# Patient Record
Sex: FEMALE | Race: WHITE | HISPANIC OR LATINO | Employment: OTHER | URBAN - METROPOLITAN AREA
[De-identification: names, ages, dates, MRNs, and addresses within clinical notes are randomized per-mention and may not be internally consistent; named-entity substitution may affect disease eponyms.]

---

## 2021-01-01 ENCOUNTER — APPOINTMENT (OUTPATIENT)
Dept: CT IMAGING | Facility: HOSPITAL | Age: 86
DRG: 291 | End: 2021-01-01
Payer: MEDICARE

## 2021-01-01 ENCOUNTER — HOSPITAL ENCOUNTER (INPATIENT)
Facility: HOSPITAL | Age: 86
LOS: 2 days | Discharge: HOME HEALTH | DRG: 291 | End: 2021-12-21
Attending: EMERGENCY MEDICINE | Admitting: INTERNAL MEDICINE
Payer: MEDICARE

## 2021-01-01 ENCOUNTER — OFFICE VISIT (OUTPATIENT)
Dept: INTERNAL MEDICINE CLINIC | Facility: CLINIC | Age: 86
End: 2021-01-01
Payer: MEDICARE

## 2021-01-01 ENCOUNTER — APPOINTMENT (INPATIENT)
Dept: NON INVASIVE DIAGNOSTICS | Facility: HOSPITAL | Age: 86
DRG: 291 | End: 2021-01-01
Payer: MEDICARE

## 2021-01-01 ENCOUNTER — TRANSITIONAL CARE MANAGEMENT (OUTPATIENT)
Dept: INTERNAL MEDICINE CLINIC | Facility: CLINIC | Age: 86
End: 2021-01-01

## 2021-01-01 ENCOUNTER — APPOINTMENT (EMERGENCY)
Dept: RADIOLOGY | Facility: HOSPITAL | Age: 86
DRG: 291 | End: 2021-01-01
Payer: MEDICARE

## 2021-01-01 VITALS
WEIGHT: 129 LBS | SYSTOLIC BLOOD PRESSURE: 130 MMHG | DIASTOLIC BLOOD PRESSURE: 78 MMHG | TEMPERATURE: 98.8 F | HEART RATE: 120 BPM | BODY MASS INDEX: 27.83 KG/M2 | HEIGHT: 57 IN | OXYGEN SATURATION: 98 %

## 2021-01-01 VITALS
RESPIRATION RATE: 18 BRPM | WEIGHT: 126.98 LBS | HEIGHT: 57 IN | DIASTOLIC BLOOD PRESSURE: 57 MMHG | HEART RATE: 111 BPM | TEMPERATURE: 98.3 F | BODY MASS INDEX: 27.4 KG/M2 | SYSTOLIC BLOOD PRESSURE: 110 MMHG | OXYGEN SATURATION: 97 %

## 2021-01-01 DIAGNOSIS — I50.9 ACUTE CONGESTIVE HEART FAILURE, UNSPECIFIED HEART FAILURE TYPE (HCC): ICD-10-CM

## 2021-01-01 DIAGNOSIS — E55.9 VITAMIN D DEFICIENCY: ICD-10-CM

## 2021-01-01 DIAGNOSIS — Z95.810 STATUS POST IMPLANTATION OF AUTOMATIC CARDIOVERTER/DEFIBRILLATOR (AICD): ICD-10-CM

## 2021-01-01 DIAGNOSIS — R06.02 SHORTNESS OF BREATH: Primary | ICD-10-CM

## 2021-01-01 DIAGNOSIS — E53.8 VITAMIN B12 DEFICIENCY: ICD-10-CM

## 2021-01-01 DIAGNOSIS — I11.0 HYPERTENSIVE HEART DISEASE WITH CONGESTIVE HEART FAILURE, UNSPECIFIED HEART FAILURE TYPE (HCC): ICD-10-CM

## 2021-01-01 DIAGNOSIS — R79.89 ELEVATED BRAIN NATRIURETIC PEPTIDE (BNP) LEVEL: ICD-10-CM

## 2021-01-01 DIAGNOSIS — F41.9 ANXIETY: ICD-10-CM

## 2021-01-01 DIAGNOSIS — J44.9 CHRONIC OBSTRUCTIVE PULMONARY DISEASE, UNSPECIFIED COPD TYPE (HCC): ICD-10-CM

## 2021-01-01 DIAGNOSIS — G47.33 OBSTRUCTIVE SLEEP APNEA SYNDROME: ICD-10-CM

## 2021-01-01 DIAGNOSIS — K21.9 GASTROESOPHAGEAL REFLUX DISEASE WITHOUT ESOPHAGITIS: ICD-10-CM

## 2021-01-01 DIAGNOSIS — I50.9 CHRONIC CONGESTIVE HEART FAILURE, UNSPECIFIED HEART FAILURE TYPE (HCC): ICD-10-CM

## 2021-01-01 DIAGNOSIS — E78.2 MIXED HYPERLIPIDEMIA: ICD-10-CM

## 2021-01-01 DIAGNOSIS — E11.9 NON-INSULIN DEPENDENT TYPE 2 DIABETES MELLITUS (HCC): ICD-10-CM

## 2021-01-01 DIAGNOSIS — I10 HYPERTENSION, UNSPECIFIED TYPE: Primary | ICD-10-CM

## 2021-01-01 LAB
2HR DELTA HS TROPONIN: 2 NG/L
4HR DELTA HS TROPONIN: 38 NG/L
ALBUMIN SERPL BCP-MCNC: 3.7 G/DL (ref 3.4–4.8)
ALP SERPL-CCNC: 59 U/L (ref 35–140)
ALT SERPL W P-5'-P-CCNC: 12 U/L (ref 5–54)
ANION GAP SERPL CALCULATED.3IONS-SCNC: 5 MMOL/L (ref 4–13)
ANION GAP SERPL CALCULATED.3IONS-SCNC: 5 MMOL/L (ref 4–13)
ANION GAP SERPL CALCULATED.3IONS-SCNC: 7 MMOL/L (ref 4–13)
ANION GAP SERPL CALCULATED.3IONS-SCNC: 8 MMOL/L (ref 4–13)
AORTIC ROOT: 2.2 CM
APICAL FOUR CHAMBER EJECTION FRACTION: 23 %
APTT PPP: 23 SECONDS (ref 23–37)
AST SERPL W P-5'-P-CCNC: 16 U/L (ref 15–41)
ATRIAL RATE: 120 BPM
BASOPHILS # BLD AUTO: 0.01 THOUSANDS/ΜL (ref 0–0.1)
BASOPHILS # BLD AUTO: 0.01 THOUSANDS/ΜL (ref 0–0.1)
BASOPHILS # BLD AUTO: 0.04 THOUSANDS/ΜL (ref 0–0.1)
BASOPHILS NFR BLD AUTO: 0 % (ref 0–1)
BILIRUB SERPL-MCNC: 0.66 MG/DL (ref 0.3–1.2)
BILIRUB UR QL STRIP: NEGATIVE
BNP SERPL-MCNC: 594.1 PG/ML (ref 1–100)
BUN SERPL-MCNC: 19 MG/DL (ref 6–20)
BUN SERPL-MCNC: 20 MG/DL (ref 6–20)
BUN SERPL-MCNC: 22 MG/DL (ref 6–20)
BUN SERPL-MCNC: 25 MG/DL (ref 6–20)
CALCIUM SERPL-MCNC: 8.5 MG/DL (ref 8.4–10.2)
CALCIUM SERPL-MCNC: 8.6 MG/DL (ref 8.4–10.2)
CALCIUM SERPL-MCNC: 8.7 MG/DL (ref 8.4–10.2)
CALCIUM SERPL-MCNC: 9 MG/DL (ref 8.4–10.2)
CARDIAC TROPONIN I PNL SERPL HS: 23 NG/L
CARDIAC TROPONIN I PNL SERPL HS: 25 NG/L
CARDIAC TROPONIN I PNL SERPL HS: 61 NG/L
CHLORIDE SERPL-SCNC: 100 MMOL/L (ref 96–108)
CHLORIDE SERPL-SCNC: 101 MMOL/L (ref 96–108)
CHLORIDE SERPL-SCNC: 102 MMOL/L (ref 96–108)
CHLORIDE SERPL-SCNC: 104 MMOL/L (ref 96–108)
CLARITY UR: CLEAR
CO2 SERPL-SCNC: 29 MMOL/L (ref 22–33)
CO2 SERPL-SCNC: 29 MMOL/L (ref 22–33)
CO2 SERPL-SCNC: 32 MMOL/L (ref 22–33)
CO2 SERPL-SCNC: 33 MMOL/L (ref 22–33)
COLOR UR: YELLOW
CREAT SERPL-MCNC: 0.94 MG/DL (ref 0.4–1.1)
CREAT SERPL-MCNC: 0.94 MG/DL (ref 0.4–1.1)
CREAT SERPL-MCNC: 1.12 MG/DL (ref 0.4–1.1)
CREAT SERPL-MCNC: 1.14 MG/DL (ref 0.4–1.1)
D DIMER PPP FEU-MCNC: 9.65 UG/ML FEU
EOSINOPHIL # BLD AUTO: 0 THOUSAND/ΜL (ref 0–0.61)
EOSINOPHIL NFR BLD AUTO: 0 % (ref 0–6)
ERYTHROCYTE [DISTWIDTH] IN BLOOD BY AUTOMATED COUNT: 13.4 % (ref 11.6–15.1)
ERYTHROCYTE [DISTWIDTH] IN BLOOD BY AUTOMATED COUNT: 13.6 % (ref 11.6–15.1)
ERYTHROCYTE [DISTWIDTH] IN BLOOD BY AUTOMATED COUNT: 13.8 % (ref 11.6–15.1)
ERYTHROCYTE [DISTWIDTH] IN BLOOD BY AUTOMATED COUNT: 14 % (ref 11.6–15.1)
ERYTHROCYTE [DISTWIDTH] IN BLOOD BY AUTOMATED COUNT: 14.3 % (ref 11.6–15.1)
EST. AVERAGE GLUCOSE BLD GHB EST-MCNC: 123 MG/DL
FLUAV RNA RESP QL NAA+PROBE: NEGATIVE
FLUBV RNA RESP QL NAA+PROBE: NEGATIVE
FRACTIONAL SHORTENING: 8 % (ref 28–44)
GFR SERPL CREATININE-BSD FRML MDRD: 43 ML/MIN/1.73SQ M
GFR SERPL CREATININE-BSD FRML MDRD: 44 ML/MIN/1.73SQ M
GFR SERPL CREATININE-BSD FRML MDRD: 55 ML/MIN/1.73SQ M
GFR SERPL CREATININE-BSD FRML MDRD: 55 ML/MIN/1.73SQ M
GLUCOSE P FAST SERPL-MCNC: 93 MG/DL (ref 70–105)
GLUCOSE SERPL-MCNC: 101 MG/DL (ref 65–140)
GLUCOSE SERPL-MCNC: 103 MG/DL (ref 65–140)
GLUCOSE SERPL-MCNC: 105 MG/DL (ref 65–140)
GLUCOSE SERPL-MCNC: 108 MG/DL (ref 65–140)
GLUCOSE SERPL-MCNC: 113 MG/DL (ref 65–140)
GLUCOSE SERPL-MCNC: 114 MG/DL (ref 65–140)
GLUCOSE SERPL-MCNC: 117 MG/DL (ref 65–140)
GLUCOSE SERPL-MCNC: 122 MG/DL (ref 65–140)
GLUCOSE SERPL-MCNC: 124 MG/DL (ref 65–140)
GLUCOSE SERPL-MCNC: 126 MG/DL (ref 65–140)
GLUCOSE SERPL-MCNC: 152 MG/DL (ref 65–140)
GLUCOSE SERPL-MCNC: 157 MG/DL (ref 65–140)
GLUCOSE SERPL-MCNC: 76 MG/DL (ref 65–140)
GLUCOSE SERPL-MCNC: 86 MG/DL (ref 65–140)
GLUCOSE SERPL-MCNC: 92 MG/DL (ref 65–140)
GLUCOSE SERPL-MCNC: 93 MG/DL (ref 65–140)
GLUCOSE SERPL-MCNC: 94 MG/DL (ref 65–140)
GLUCOSE UR STRIP-MCNC: NEGATIVE MG/DL
HBA1C MFR BLD: 5.9 %
HCT VFR BLD AUTO: 32.2 % (ref 34.8–46.1)
HCT VFR BLD AUTO: 32.8 % (ref 34.8–46.1)
HCT VFR BLD AUTO: 32.9 % (ref 34.8–46.1)
HCT VFR BLD AUTO: 34.2 % (ref 34.8–46.1)
HCT VFR BLD AUTO: 37.5 % (ref 34.8–46.1)
HGB BLD-MCNC: 10 G/DL (ref 11.5–15.4)
HGB BLD-MCNC: 10.2 G/DL (ref 11.5–15.4)
HGB BLD-MCNC: 10.4 G/DL (ref 11.5–15.4)
HGB BLD-MCNC: 10.8 G/DL (ref 11.5–15.4)
HGB BLD-MCNC: 11.7 G/DL (ref 11.5–15.4)
HGB UR QL STRIP.AUTO: NEGATIVE
IMM GRANULOCYTES # BLD AUTO: 0.03 THOUSAND/UL (ref 0–0.2)
IMM GRANULOCYTES # BLD AUTO: 0.04 THOUSAND/UL (ref 0–0.2)
IMM GRANULOCYTES # BLD AUTO: 0.08 THOUSAND/UL (ref 0–0.2)
IMM GRANULOCYTES NFR BLD AUTO: 1 % (ref 0–2)
INR PPP: 0.97 (ref 0.84–1.19)
INTERVENTRICULAR SEPTUM IN DIASTOLE (PARASTERNAL SHORT AXIS VIEW): 0.9 CM
KETONES UR STRIP-MCNC: NEGATIVE MG/DL
LAAS-AP4: 22.1 CM2
LEFT INTERNAL DIMENSION IN SYSTOLE: 4.4 CM (ref 2.1–4)
LEFT VENTRICLE DIASTOLIC VOLUME (MOD BIPLANE): 78 ML
LEFT VENTRICLE SYSTOLIC VOLUME (MOD BIPLANE): 55 ML
LEFT VENTRICULAR INTERNAL DIMENSION IN DIASTOLE: 4.8 CM (ref 3.71–5.52)
LEFT VENTRICULAR POSTERIOR WALL IN END DIASTOLE: 0.8 CM
LEFT VENTRICULAR STROKE VOLUME: 22 ML
LEUKOCYTE ESTERASE UR QL STRIP: NEGATIVE
LV EF: 30 %
LYMPHOCYTES # BLD AUTO: 1.32 THOUSANDS/ΜL (ref 0.6–4.47)
LYMPHOCYTES # BLD AUTO: 1.5 THOUSANDS/ΜL (ref 0.6–4.47)
LYMPHOCYTES # BLD AUTO: 1.87 THOUSANDS/ΜL (ref 0.6–4.47)
LYMPHOCYTES NFR BLD AUTO: 16 % (ref 14–44)
LYMPHOCYTES NFR BLD AUTO: 22 % (ref 14–44)
LYMPHOCYTES NFR BLD AUTO: 26 % (ref 14–44)
MAGNESIUM SERPL-MCNC: 1.9 MG/DL (ref 1.6–2.6)
MAGNESIUM SERPL-MCNC: 2 MG/DL (ref 1.6–2.6)
MCH RBC QN AUTO: 27.6 PG (ref 26.8–34.3)
MCH RBC QN AUTO: 27.8 PG (ref 26.8–34.3)
MCH RBC QN AUTO: 28.1 PG (ref 26.8–34.3)
MCHC RBC AUTO-ENTMCNC: 31.1 G/DL (ref 31.4–37.4)
MCHC RBC AUTO-ENTMCNC: 31.1 G/DL (ref 31.4–37.4)
MCHC RBC AUTO-ENTMCNC: 31.2 G/DL (ref 31.4–37.4)
MCHC RBC AUTO-ENTMCNC: 31.6 G/DL (ref 31.4–37.4)
MCHC RBC AUTO-ENTMCNC: 31.6 G/DL (ref 31.4–37.4)
MCV RBC AUTO: 88 FL (ref 82–98)
MCV RBC AUTO: 88 FL (ref 82–98)
MCV RBC AUTO: 89 FL (ref 82–98)
MONOCYTES # BLD AUTO: 0.61 THOUSAND/ΜL (ref 0.17–1.22)
MONOCYTES # BLD AUTO: 0.69 THOUSAND/ΜL (ref 0.17–1.22)
MONOCYTES # BLD AUTO: 0.87 THOUSAND/ΜL (ref 0.17–1.22)
MONOCYTES NFR BLD AUTO: 11 % (ref 4–12)
MONOCYTES NFR BLD AUTO: 12 % (ref 4–12)
MONOCYTES NFR BLD AUTO: 8 % (ref 4–12)
NEUTROPHILS # BLD AUTO: 3.52 THOUSANDS/ΜL (ref 1.85–7.62)
NEUTROPHILS # BLD AUTO: 3.87 THOUSANDS/ΜL (ref 1.85–7.62)
NEUTROPHILS # BLD AUTO: 8.58 THOUSANDS/ΜL (ref 1.85–7.62)
NEUTS SEG NFR BLD AUTO: 62 % (ref 43–75)
NEUTS SEG NFR BLD AUTO: 65 % (ref 43–75)
NEUTS SEG NFR BLD AUTO: 75 % (ref 43–75)
NITRITE UR QL STRIP: NEGATIVE
NRBC BLD AUTO-RTO: 0 /100 WBCS
P AXIS: 0 DEGREES
PH UR STRIP.AUTO: 5.5 [PH]
PLATELET # BLD AUTO: 213 THOUSANDS/UL (ref 149–390)
PLATELET # BLD AUTO: 221 THOUSANDS/UL (ref 149–390)
PLATELET # BLD AUTO: 222 THOUSANDS/UL (ref 149–390)
PLATELET # BLD AUTO: 232 THOUSANDS/UL (ref 149–390)
PLATELET # BLD AUTO: 275 THOUSANDS/UL (ref 149–390)
PMV BLD AUTO: 10.2 FL (ref 8.9–12.7)
PMV BLD AUTO: 10.5 FL (ref 8.9–12.7)
PMV BLD AUTO: 10.8 FL (ref 8.9–12.7)
PMV BLD AUTO: 10.8 FL (ref 8.9–12.7)
PMV BLD AUTO: 10.9 FL (ref 8.9–12.7)
POTASSIUM SERPL-SCNC: 4 MMOL/L (ref 3.5–5)
POTASSIUM SERPL-SCNC: 4.6 MMOL/L (ref 3.5–5)
POTASSIUM SERPL-SCNC: 4.8 MMOL/L (ref 3.5–5)
POTASSIUM SERPL-SCNC: 5 MMOL/L (ref 3.5–5)
PR INTERVAL: 116 MS
PROT SERPL-MCNC: 6.8 G/DL (ref 6.4–8.3)
PROT UR STRIP-MCNC: NEGATIVE MG/DL
PROTHROMBIN TIME: 12.8 SECONDS (ref 11.6–14.5)
QRS AXIS: -82 DEGREES
QRSD INTERVAL: 153 MS
QT INTERVAL: 396 MS
QTC INTERVAL: 560 MS
RBC # BLD AUTO: 3.62 MILLION/UL (ref 3.81–5.12)
RBC # BLD AUTO: 3.69 MILLION/UL (ref 3.81–5.12)
RBC # BLD AUTO: 3.74 MILLION/UL (ref 3.81–5.12)
RBC # BLD AUTO: 3.85 MILLION/UL (ref 3.81–5.12)
RBC # BLD AUTO: 4.24 MILLION/UL (ref 3.81–5.12)
RIGHT ATRIAL 2D VOLUME: 40 ML
RIGHT ATRIUM AREA SYSTOLE A4C: 14.4 CM2
RIGHT VENTRICLE ID DIMENSION: 3.2 CM
RSV RNA RESP QL NAA+PROBE: NEGATIVE
RV PSP: 71 MMHG
SARS-COV-2 RNA RESP QL NAA+PROBE: NEGATIVE
SL CV LV EF: 30
SL CV PED ECHO LEFT VENTRICLE DIASTOLIC VOLUME (MOD BIPLANE) 2D: 108 ML
SL CV PED ECHO LEFT VENTRICLE SYSTOLIC VOLUME (MOD BIPLANE) 2D: 87 ML
SODIUM SERPL-SCNC: 138 MMOL/L (ref 133–145)
SODIUM SERPL-SCNC: 138 MMOL/L (ref 133–145)
SODIUM SERPL-SCNC: 139 MMOL/L (ref 133–145)
SODIUM SERPL-SCNC: 140 MMOL/L (ref 133–145)
SP GR UR STRIP.AUTO: 1.02 (ref 1–1.03)
T WAVE AXIS: 83 DEGREES
TR PEAK VELOCITY: 66 M/S
TRICUSPID VALVE PEAK REGURGITATION VELOCITY: 4.08 M/S
TRICUSPID VALVE S': 61 CM/S
TSH SERPL DL<=0.05 MIU/L-ACNC: 3.14 UIU/ML (ref 0.34–5.6)
TV PEAK GRADIENT: 66 MMHG
UROBILINOGEN UR QL STRIP.AUTO: 0.2 E.U./DL
VENTRICULAR RATE: 120 BPM
WBC # BLD AUTO: 11.44 THOUSAND/UL (ref 4.31–10.16)
WBC # BLD AUTO: 5.68 THOUSAND/UL (ref 4.31–10.16)
WBC # BLD AUTO: 5.68 THOUSAND/UL (ref 4.31–10.16)
WBC # BLD AUTO: 5.92 THOUSAND/UL (ref 4.31–10.16)
WBC # BLD AUTO: 8.05 THOUSAND/UL (ref 4.31–10.16)
Z-SCORE OF LEFT VENTRICULAR DIMENSION IN END SYSTOLE: 0.59

## 2021-01-01 PROCEDURE — 85610 PROTHROMBIN TIME: CPT | Performed by: STUDENT IN AN ORGANIZED HEALTH CARE EDUCATION/TRAINING PROGRAM

## 2021-01-01 PROCEDURE — 93306 TTE W/DOPPLER COMPLETE: CPT

## 2021-01-01 PROCEDURE — 85027 COMPLETE CBC AUTOMATED: CPT | Performed by: NURSE PRACTITIONER

## 2021-01-01 PROCEDURE — 99232 SBSQ HOSP IP/OBS MODERATE 35: CPT | Performed by: PHYSICIAN ASSISTANT

## 2021-01-01 PROCEDURE — 99205 OFFICE O/P NEW HI 60 MIN: CPT | Performed by: INTERNAL MEDICINE

## 2021-01-01 PROCEDURE — G1004 CDSM NDSC: HCPCS

## 2021-01-01 PROCEDURE — 82948 REAGENT STRIP/BLOOD GLUCOSE: CPT

## 2021-01-01 PROCEDURE — 85025 COMPLETE CBC W/AUTO DIFF WBC: CPT | Performed by: PHYSICIAN ASSISTANT

## 2021-01-01 PROCEDURE — 93005 ELECTROCARDIOGRAM TRACING: CPT

## 2021-01-01 PROCEDURE — 96374 THER/PROPH/DIAG INJ IV PUSH: CPT

## 2021-01-01 PROCEDURE — 84484 ASSAY OF TROPONIN QUANT: CPT | Performed by: STUDENT IN AN ORGANIZED HEALTH CARE EDUCATION/TRAINING PROGRAM

## 2021-01-01 PROCEDURE — 36415 COLL VENOUS BLD VENIPUNCTURE: CPT | Performed by: STUDENT IN AN ORGANIZED HEALTH CARE EDUCATION/TRAINING PROGRAM

## 2021-01-01 PROCEDURE — 85379 FIBRIN DEGRADATION QUANT: CPT

## 2021-01-01 PROCEDURE — 80048 BASIC METABOLIC PNL TOTAL CA: CPT | Performed by: PHYSICIAN ASSISTANT

## 2021-01-01 PROCEDURE — 99223 1ST HOSP IP/OBS HIGH 75: CPT | Performed by: INTERNAL MEDICINE

## 2021-01-01 PROCEDURE — 83735 ASSAY OF MAGNESIUM: CPT | Performed by: STUDENT IN AN ORGANIZED HEALTH CARE EDUCATION/TRAINING PROGRAM

## 2021-01-01 PROCEDURE — 80048 BASIC METABOLIC PNL TOTAL CA: CPT | Performed by: INTERNAL MEDICINE

## 2021-01-01 PROCEDURE — 80053 COMPREHEN METABOLIC PANEL: CPT | Performed by: STUDENT IN AN ORGANIZED HEALTH CARE EDUCATION/TRAINING PROGRAM

## 2021-01-01 PROCEDURE — 85025 COMPLETE CBC W/AUTO DIFF WBC: CPT | Performed by: STUDENT IN AN ORGANIZED HEALTH CARE EDUCATION/TRAINING PROGRAM

## 2021-01-01 PROCEDURE — 71045 X-RAY EXAM CHEST 1 VIEW: CPT

## 2021-01-01 PROCEDURE — 84443 ASSAY THYROID STIM HORMONE: CPT | Performed by: PHYSICIAN ASSISTANT

## 2021-01-01 PROCEDURE — 99239 HOSP IP/OBS DSCHRG MGMT >30: CPT | Performed by: PHYSICIAN ASSISTANT

## 2021-01-01 PROCEDURE — 83880 ASSAY OF NATRIURETIC PEPTIDE: CPT | Performed by: STUDENT IN AN ORGANIZED HEALTH CARE EDUCATION/TRAINING PROGRAM

## 2021-01-01 PROCEDURE — 97163 PT EVAL HIGH COMPLEX 45 MIN: CPT

## 2021-01-01 PROCEDURE — 71275 CT ANGIOGRAPHY CHEST: CPT

## 2021-01-01 PROCEDURE — 93010 ELECTROCARDIOGRAM REPORT: CPT | Performed by: INTERNAL MEDICINE

## 2021-01-01 PROCEDURE — 99232 SBSQ HOSP IP/OBS MODERATE 35: CPT | Performed by: INTERNAL MEDICINE

## 2021-01-01 PROCEDURE — 83735 ASSAY OF MAGNESIUM: CPT | Performed by: NURSE PRACTITIONER

## 2021-01-01 PROCEDURE — 85027 COMPLETE CBC AUTOMATED: CPT | Performed by: INTERNAL MEDICINE

## 2021-01-01 PROCEDURE — 83036 HEMOGLOBIN GLYCOSYLATED A1C: CPT | Performed by: NURSE PRACTITIONER

## 2021-01-01 PROCEDURE — 99225 PR SBSQ OBSERVATION CARE/DAY 25 MINUTES: CPT | Performed by: INTERNAL MEDICINE

## 2021-01-01 PROCEDURE — 85730 THROMBOPLASTIN TIME PARTIAL: CPT | Performed by: STUDENT IN AN ORGANIZED HEALTH CARE EDUCATION/TRAINING PROGRAM

## 2021-01-01 PROCEDURE — 0241U HB NFCT DS VIR RESP RNA 4 TRGT: CPT | Performed by: STUDENT IN AN ORGANIZED HEALTH CARE EDUCATION/TRAINING PROGRAM

## 2021-01-01 PROCEDURE — 81003 URINALYSIS AUTO W/O SCOPE: CPT | Performed by: STUDENT IN AN ORGANIZED HEALTH CARE EDUCATION/TRAINING PROGRAM

## 2021-01-01 PROCEDURE — 99285 EMERGENCY DEPT VISIT HI MDM: CPT | Performed by: STUDENT IN AN ORGANIZED HEALTH CARE EDUCATION/TRAINING PROGRAM

## 2021-01-01 PROCEDURE — 93306 TTE W/DOPPLER COMPLETE: CPT | Performed by: INTERNAL MEDICINE

## 2021-01-01 PROCEDURE — 99285 EMERGENCY DEPT VISIT HI MDM: CPT

## 2021-01-01 RX ORDER — SPIRONOLACTONE 25 MG/1
25 TABLET ORAL DAILY
Status: DISCONTINUED | OUTPATIENT
Start: 2021-01-01 | End: 2021-01-01 | Stop reason: HOSPADM

## 2021-01-01 RX ORDER — LOSARTAN POTASSIUM 25 MG/1
25 TABLET ORAL 2 TIMES DAILY
Status: DISCONTINUED | OUTPATIENT
Start: 2021-01-01 | End: 2021-01-01

## 2021-01-01 RX ORDER — ALPRAZOLAM 0.5 MG/1
0.5 TABLET ORAL DAILY PRN
Status: DISCONTINUED | OUTPATIENT
Start: 2021-01-01 | End: 2021-01-01 | Stop reason: HOSPADM

## 2021-01-01 RX ORDER — SPIRONOLACTONE 25 MG/1
25 TABLET ORAL DAILY
COMMUNITY
End: 2022-01-01 | Stop reason: HOSPADM

## 2021-01-01 RX ORDER — ASPIRIN 81 MG/1
81 TABLET, CHEWABLE ORAL DAILY
COMMUNITY
End: 2022-01-01

## 2021-01-01 RX ORDER — FUROSEMIDE 20 MG/1
20 TABLET ORAL DAILY
COMMUNITY
End: 2022-01-01 | Stop reason: HOSPADM

## 2021-01-01 RX ORDER — FUROSEMIDE 10 MG/ML
20 INJECTION INTRAMUSCULAR; INTRAVENOUS DAILY
Status: DISCONTINUED | OUTPATIENT
Start: 2021-01-01 | End: 2021-01-01

## 2021-01-01 RX ORDER — ESCITALOPRAM OXALATE 10 MG/1
10 TABLET ORAL DAILY
Status: DISCONTINUED | OUTPATIENT
Start: 2021-01-01 | End: 2021-01-01 | Stop reason: HOSPADM

## 2021-01-01 RX ORDER — LOSARTAN POTASSIUM 25 MG/1
25 TABLET ORAL DAILY
Status: DISCONTINUED | OUTPATIENT
Start: 2021-01-01 | End: 2021-01-01 | Stop reason: HOSPADM

## 2021-01-01 RX ORDER — ESCITALOPRAM OXALATE 10 MG/1
10 TABLET ORAL DAILY
Qty: 30 TABLET | Refills: 0 | Status: SHIPPED | OUTPATIENT
Start: 2021-01-01 | End: 2021-01-01

## 2021-01-01 RX ORDER — ISOSORBIDE DINITRATE 30 MG/1
30 TABLET ORAL 4 TIMES DAILY
COMMUNITY
End: 2021-01-01 | Stop reason: SDUPTHER

## 2021-01-01 RX ORDER — ALPRAZOLAM 0.25 MG/1
0.25 TABLET ORAL 3 TIMES DAILY
Status: DISCONTINUED | OUTPATIENT
Start: 2021-01-01 | End: 2021-01-01 | Stop reason: HOSPADM

## 2021-01-01 RX ORDER — LOSARTAN POTASSIUM 50 MG/1
50 TABLET ORAL 2 TIMES DAILY
COMMUNITY
End: 2021-01-01 | Stop reason: HOSPADM

## 2021-01-01 RX ORDER — ASPIRIN 81 MG/1
81 TABLET, CHEWABLE ORAL DAILY
Status: DISCONTINUED | OUTPATIENT
Start: 2021-01-01 | End: 2021-01-01 | Stop reason: HOSPADM

## 2021-01-01 RX ORDER — ISOSORBIDE MONONITRATE 30 MG/1
30 TABLET, EXTENDED RELEASE ORAL DAILY
Status: DISCONTINUED | OUTPATIENT
Start: 2021-01-01 | End: 2021-01-01

## 2021-01-01 RX ORDER — ALPRAZOLAM 0.25 MG/1
0.25 TABLET ORAL 3 TIMES DAILY PRN
Qty: 20 TABLET | Refills: 0 | Status: SHIPPED | OUTPATIENT
Start: 2021-01-01 | End: 2022-01-01

## 2021-01-01 RX ORDER — IPRATROPIUM BROMIDE AND ALBUTEROL SULFATE 2.5; .5 MG/3ML; MG/3ML
3 SOLUTION RESPIRATORY (INHALATION) ONCE
Status: DISCONTINUED | OUTPATIENT
Start: 2021-01-01 | End: 2021-01-01

## 2021-01-01 RX ORDER — FAMOTIDINE 20 MG/1
20 TABLET, FILM COATED ORAL DAILY
Status: DISCONTINUED | OUTPATIENT
Start: 2021-01-01 | End: 2021-01-01 | Stop reason: HOSPADM

## 2021-01-01 RX ORDER — LOSARTAN POTASSIUM 25 MG/1
25 TABLET ORAL DAILY
Qty: 30 TABLET | Refills: 0 | Status: SHIPPED | OUTPATIENT
Start: 2021-01-01 | End: 2021-01-01

## 2021-01-01 RX ORDER — FUROSEMIDE 10 MG/ML
20 INJECTION INTRAMUSCULAR; INTRAVENOUS ONCE
Status: DISCONTINUED | OUTPATIENT
Start: 2021-01-01 | End: 2021-01-01

## 2021-01-01 RX ORDER — ESCITALOPRAM OXALATE 10 MG/1
10 TABLET ORAL DAILY
Qty: 30 TABLET | Refills: 0 | Status: SHIPPED | OUTPATIENT
Start: 2021-01-01 | End: 2022-01-01 | Stop reason: SDUPTHER

## 2021-01-01 RX ORDER — CARVEDILOL 3.12 MG/1
3.12 TABLET ORAL 2 TIMES DAILY WITH MEALS
COMMUNITY
End: 2022-01-01 | Stop reason: SDUPTHER

## 2021-01-01 RX ORDER — CARVEDILOL 3.12 MG/1
3.12 TABLET ORAL 2 TIMES DAILY WITH MEALS
Status: DISCONTINUED | OUTPATIENT
Start: 2021-01-01 | End: 2021-01-01

## 2021-01-01 RX ORDER — SPIRONOLACTONE 25 MG/1
25 TABLET ORAL DAILY
Status: DISCONTINUED | OUTPATIENT
Start: 2021-01-01 | End: 2021-01-01

## 2021-01-01 RX ORDER — CARVEDILOL 3.12 MG/1
3.12 TABLET ORAL 2 TIMES DAILY WITH MEALS
Status: DISCONTINUED | OUTPATIENT
Start: 2021-01-01 | End: 2021-01-01 | Stop reason: HOSPADM

## 2021-01-01 RX ORDER — FUROSEMIDE 20 MG/1
20 TABLET ORAL DAILY
Status: DISCONTINUED | OUTPATIENT
Start: 2021-01-01 | End: 2021-01-01 | Stop reason: HOSPADM

## 2021-01-01 RX ORDER — FUROSEMIDE 40 MG/1
20 TABLET ORAL DAILY
Status: DISCONTINUED | OUTPATIENT
Start: 2021-01-01 | End: 2021-01-01

## 2021-01-01 RX ORDER — FAMOTIDINE 20 MG/1
20 TABLET, FILM COATED ORAL 2 TIMES DAILY
COMMUNITY
End: 2021-01-01 | Stop reason: SDUPTHER

## 2021-01-01 RX ORDER — LOSARTAN POTASSIUM 25 MG/1
25 TABLET ORAL DAILY
Qty: 30 TABLET | Refills: 0 | Status: SHIPPED | OUTPATIENT
Start: 2021-01-01 | End: 2022-01-01

## 2021-01-01 RX ORDER — ISOSORBIDE MONONITRATE 30 MG/1
30 TABLET, EXTENDED RELEASE ORAL DAILY
COMMUNITY
End: 2021-01-01 | Stop reason: HOSPADM

## 2021-01-01 RX ORDER — LOSARTAN POTASSIUM 50 MG/1
50 TABLET ORAL 2 TIMES DAILY
Status: DISCONTINUED | OUTPATIENT
Start: 2021-01-01 | End: 2021-01-01

## 2021-01-01 RX ORDER — LORAZEPAM 2 MG/ML
0.5 INJECTION INTRAMUSCULAR ONCE
Status: COMPLETED | OUTPATIENT
Start: 2021-01-01 | End: 2021-01-01

## 2021-01-01 RX ORDER — FAMOTIDINE 20 MG/1
20 TABLET, FILM COATED ORAL DAILY
COMMUNITY
End: 2022-01-01 | Stop reason: ALTCHOICE

## 2021-01-01 RX ADMIN — FUROSEMIDE 20 MG: 20 TABLET ORAL at 11:56

## 2021-01-01 RX ADMIN — FAMOTIDINE 20 MG: 20 TABLET ORAL at 09:01

## 2021-01-01 RX ADMIN — SPIRONOLACTONE 25 MG: 25 TABLET ORAL at 08:58

## 2021-01-01 RX ADMIN — LOSARTAN POTASSIUM 25 MG: 25 TABLET, FILM COATED ORAL at 12:06

## 2021-01-01 RX ADMIN — LOSARTAN POTASSIUM 50 MG: 50 TABLET, FILM COATED ORAL at 08:52

## 2021-01-01 RX ADMIN — FUROSEMIDE 20 MG: 20 TABLET ORAL at 08:58

## 2021-01-01 RX ADMIN — IOHEXOL 100 ML: 350 INJECTION, SOLUTION INTRAVENOUS at 04:01

## 2021-01-01 RX ADMIN — ESCITALOPRAM OXALATE 10 MG: 10 TABLET ORAL at 11:57

## 2021-01-01 RX ADMIN — FAMOTIDINE 20 MG: 20 TABLET ORAL at 09:06

## 2021-01-01 RX ADMIN — ALPRAZOLAM 0.25 MG: 0.25 TABLET ORAL at 08:58

## 2021-01-01 RX ADMIN — FUROSEMIDE 20 MG: 10 INJECTION, SOLUTION INTRAMUSCULAR; INTRAVENOUS at 08:53

## 2021-01-01 RX ADMIN — ASPIRIN 81 MG: 81 TABLET, CHEWABLE ORAL at 08:52

## 2021-01-01 RX ADMIN — ASPIRIN 81 MG: 81 TABLET, CHEWABLE ORAL at 08:58

## 2021-01-01 RX ADMIN — CARVEDILOL 3.12 MG: 3.12 TABLET, FILM COATED ORAL at 21:45

## 2021-01-01 RX ADMIN — ISOSORBIDE MONONITRATE 30 MG: 30 TABLET, EXTENDED RELEASE ORAL at 09:06

## 2021-01-01 RX ADMIN — ENOXAPARIN SODIUM 30 MG: 30 INJECTION SUBCUTANEOUS at 09:01

## 2021-01-01 RX ADMIN — ENOXAPARIN SODIUM 30 MG: 30 INJECTION SUBCUTANEOUS at 08:58

## 2021-01-01 RX ADMIN — SPIRONOLACTONE 25 MG: 25 TABLET ORAL at 11:56

## 2021-01-01 RX ADMIN — LOSARTAN POTASSIUM 25 MG: 25 TABLET, FILM COATED ORAL at 21:40

## 2021-01-01 RX ADMIN — ENOXAPARIN SODIUM 40 MG: 40 INJECTION SUBCUTANEOUS at 08:53

## 2021-01-01 RX ADMIN — ALPRAZOLAM 0.25 MG: 0.25 TABLET ORAL at 11:56

## 2021-01-01 RX ADMIN — ASPIRIN 81 MG: 81 TABLET, CHEWABLE ORAL at 09:06

## 2021-01-01 RX ADMIN — CARVEDILOL 3.12 MG: 3.12 TABLET, FILM COATED ORAL at 09:07

## 2021-01-01 RX ADMIN — LORAZEPAM 0.5 MG: 2 INJECTION INTRAMUSCULAR; INTRAVENOUS at 00:11

## 2021-01-01 RX ADMIN — FAMOTIDINE 20 MG: 20 TABLET ORAL at 08:52

## 2021-01-01 RX ADMIN — ALPRAZOLAM 0.25 MG: 0.25 TABLET ORAL at 17:49

## 2021-01-01 RX ADMIN — CARVEDILOL 3.12 MG: 3.12 TABLET, FILM COATED ORAL at 08:55

## 2021-01-01 RX ADMIN — ENOXAPARIN SODIUM 40 MG: 40 INJECTION SUBCUTANEOUS at 09:07

## 2021-01-01 RX ADMIN — CARVEDILOL 3.12 MG: 3.12 TABLET, FILM COATED ORAL at 17:44

## 2021-01-01 RX ADMIN — ALPRAZOLAM 0.25 MG: 0.25 TABLET ORAL at 21:39

## 2021-01-01 RX ADMIN — ASPIRIN 81 MG: 81 TABLET, CHEWABLE ORAL at 09:01

## 2021-01-01 RX ADMIN — ISOSORBIDE MONONITRATE 30 MG: 30 TABLET, EXTENDED RELEASE ORAL at 08:52

## 2021-01-01 RX ADMIN — ESCITALOPRAM OXALATE 10 MG: 10 TABLET ORAL at 08:58

## 2021-01-01 RX ADMIN — FAMOTIDINE 20 MG: 20 TABLET ORAL at 08:58

## 2021-12-16 PROBLEM — I50.9 CONGESTIVE HEART FAILURE (CHF) (HCC): Status: ACTIVE | Noted: 2021-01-01

## 2021-12-16 PROBLEM — G47.33 OBSTRUCTIVE SLEEP APNEA SYNDROME: Status: ACTIVE | Noted: 2021-01-01

## 2021-12-16 PROBLEM — Z95.810 STATUS POST IMPLANTATION OF AUTOMATIC CARDIOVERTER/DEFIBRILLATOR (AICD): Status: ACTIVE | Noted: 2021-01-01

## 2021-12-16 PROBLEM — E11.9 NON-INSULIN DEPENDENT TYPE 2 DIABETES MELLITUS (HCC): Status: ACTIVE | Noted: 2021-01-01

## 2021-12-16 PROBLEM — K21.9 GASTROESOPHAGEAL REFLUX DISEASE WITHOUT ESOPHAGITIS: Status: ACTIVE | Noted: 2021-01-01

## 2021-12-16 PROBLEM — E53.8 VITAMIN B12 DEFICIENCY: Status: ACTIVE | Noted: 2021-01-01

## 2021-12-16 PROBLEM — E55.9 VITAMIN D DEFICIENCY: Status: ACTIVE | Noted: 2021-01-01

## 2021-12-16 PROBLEM — I11.0 HYPERTENSIVE HEART DISEASE WITH CONGESTIVE HEART FAILURE (HCC): Status: ACTIVE | Noted: 2021-01-01

## 2021-12-16 PROBLEM — J44.9 COPD (CHRONIC OBSTRUCTIVE PULMONARY DISEASE) (HCC): Status: ACTIVE | Noted: 2021-01-01

## 2021-12-16 PROBLEM — I10 HYPERTENSION: Status: ACTIVE | Noted: 2021-01-01

## 2021-12-16 PROBLEM — E78.2 MIXED HYPERLIPIDEMIA: Status: ACTIVE | Noted: 2021-01-01

## 2021-12-18 PROBLEM — R06.02 SHORTNESS OF BREATH: Status: ACTIVE | Noted: 2021-01-01

## 2022-01-01 ENCOUNTER — TELEPHONE (OUTPATIENT)
Dept: INTERNAL MEDICINE CLINIC | Facility: CLINIC | Age: 87
End: 2022-01-01

## 2022-01-01 ENCOUNTER — TELEPHONE (OUTPATIENT)
Dept: CARDIOLOGY CLINIC | Facility: CLINIC | Age: 87
End: 2022-01-01

## 2022-01-01 ENCOUNTER — APPOINTMENT (EMERGENCY)
Dept: CT IMAGING | Facility: HOSPITAL | Age: 87
DRG: 193 | End: 2022-01-01
Payer: MEDICARE

## 2022-01-01 ENCOUNTER — OFFICE VISIT (OUTPATIENT)
Dept: CARDIOLOGY CLINIC | Facility: CLINIC | Age: 87
End: 2022-01-01
Payer: MEDICARE

## 2022-01-01 ENCOUNTER — TRANSITIONAL CARE MANAGEMENT (OUTPATIENT)
Dept: INTERNAL MEDICINE CLINIC | Facility: CLINIC | Age: 87
End: 2022-01-01

## 2022-01-01 ENCOUNTER — HOSPITAL ENCOUNTER (INPATIENT)
Facility: HOSPITAL | Age: 87
LOS: 2 days | Discharge: HOSPICE | DRG: 291 | End: 2022-05-09
Attending: EMERGENCY MEDICINE | Admitting: INTERNAL MEDICINE
Payer: MEDICARE

## 2022-01-01 ENCOUNTER — HOSPITAL ENCOUNTER (INPATIENT)
Facility: HOSPITAL | Age: 87
LOS: 3 days | Discharge: HOME HEALTH | DRG: 193 | End: 2022-04-23
Attending: EMERGENCY MEDICINE | Admitting: INTERNAL MEDICINE
Payer: MEDICARE

## 2022-01-01 ENCOUNTER — OFFICE VISIT (OUTPATIENT)
Dept: INTERNAL MEDICINE CLINIC | Facility: CLINIC | Age: 87
End: 2022-01-01
Payer: MEDICARE

## 2022-01-01 ENCOUNTER — APPOINTMENT (OUTPATIENT)
Dept: LAB | Facility: CLINIC | Age: 87
End: 2022-01-01
Payer: MEDICARE

## 2022-01-01 ENCOUNTER — APPOINTMENT (INPATIENT)
Dept: RADIOLOGY | Facility: HOSPITAL | Age: 87
DRG: 193 | End: 2022-01-01
Payer: MEDICARE

## 2022-01-01 ENCOUNTER — OFFICE VISIT (OUTPATIENT)
Dept: OBGYN CLINIC | Facility: CLINIC | Age: 87
End: 2022-01-01
Payer: MEDICARE

## 2022-01-01 ENCOUNTER — CONSULT (OUTPATIENT)
Dept: PULMONOLOGY | Facility: CLINIC | Age: 87
End: 2022-01-01
Payer: MEDICARE

## 2022-01-01 ENCOUNTER — TELEPHONE (OUTPATIENT)
Dept: OTHER | Facility: OTHER | Age: 87
End: 2022-01-01

## 2022-01-01 ENCOUNTER — HOSPITAL ENCOUNTER (OUTPATIENT)
Dept: RADIOLOGY | Facility: HOSPITAL | Age: 87
Discharge: HOME | End: 2022-04-13
Payer: MEDICARE

## 2022-01-01 ENCOUNTER — APPOINTMENT (EMERGENCY)
Dept: RADIOLOGY | Facility: HOSPITAL | Age: 87
DRG: 193 | End: 2022-01-01
Payer: MEDICARE

## 2022-01-01 ENCOUNTER — TELEPHONE (OUTPATIENT)
Dept: OBGYN CLINIC | Facility: HOSPITAL | Age: 87
End: 2022-01-01

## 2022-01-01 ENCOUNTER — APPOINTMENT (EMERGENCY)
Dept: RADIOLOGY | Facility: HOSPITAL | Age: 87
DRG: 291 | End: 2022-01-01
Payer: MEDICARE

## 2022-01-01 VITALS
HEART RATE: 99 BPM | OXYGEN SATURATION: 98 % | SYSTOLIC BLOOD PRESSURE: 128 MMHG | TEMPERATURE: 98.3 F | DIASTOLIC BLOOD PRESSURE: 80 MMHG | BODY MASS INDEX: 28.13 KG/M2 | WEIGHT: 130 LBS

## 2022-01-01 VITALS
HEIGHT: 55 IN | RESPIRATION RATE: 18 BRPM | DIASTOLIC BLOOD PRESSURE: 56 MMHG | TEMPERATURE: 97.6 F | HEART RATE: 63 BPM | SYSTOLIC BLOOD PRESSURE: 101 MMHG | OXYGEN SATURATION: 97 % | WEIGHT: 129.8 LBS | BODY MASS INDEX: 30.04 KG/M2

## 2022-01-01 VITALS
SYSTOLIC BLOOD PRESSURE: 106 MMHG | DIASTOLIC BLOOD PRESSURE: 68 MMHG | HEIGHT: 57 IN | HEART RATE: 116 BPM | BODY MASS INDEX: 28.2 KG/M2 | OXYGEN SATURATION: 99 % | WEIGHT: 130.7 LBS

## 2022-01-01 VITALS
SYSTOLIC BLOOD PRESSURE: 144 MMHG | WEIGHT: 132.5 LBS | TEMPERATURE: 96.5 F | DIASTOLIC BLOOD PRESSURE: 80 MMHG | RESPIRATION RATE: 18 BRPM | HEIGHT: 55 IN | BODY MASS INDEX: 30.66 KG/M2 | HEART RATE: 109 BPM | OXYGEN SATURATION: 98 %

## 2022-01-01 VITALS
SYSTOLIC BLOOD PRESSURE: 128 MMHG | TEMPERATURE: 98.5 F | OXYGEN SATURATION: 98 % | BODY MASS INDEX: 28.78 KG/M2 | WEIGHT: 133 LBS | HEART RATE: 84 BPM | DIASTOLIC BLOOD PRESSURE: 70 MMHG

## 2022-01-01 VITALS
BODY MASS INDEX: 27.87 KG/M2 | WEIGHT: 129.2 LBS | TEMPERATURE: 99.1 F | OXYGEN SATURATION: 98 % | HEIGHT: 57 IN | SYSTOLIC BLOOD PRESSURE: 102 MMHG | HEART RATE: 100 BPM | DIASTOLIC BLOOD PRESSURE: 62 MMHG

## 2022-01-01 VITALS
SYSTOLIC BLOOD PRESSURE: 138 MMHG | OXYGEN SATURATION: 97 % | HEART RATE: 98 BPM | RESPIRATION RATE: 18 BRPM | WEIGHT: 131 LBS | BODY MASS INDEX: 30.32 KG/M2 | DIASTOLIC BLOOD PRESSURE: 80 MMHG | HEIGHT: 55 IN

## 2022-01-01 VITALS — BODY MASS INDEX: 28.78 KG/M2 | HEIGHT: 57 IN | HEART RATE: 103 BPM | OXYGEN SATURATION: 98 %

## 2022-01-01 DIAGNOSIS — J10.1 INFLUENZA A: ICD-10-CM

## 2022-01-01 DIAGNOSIS — N18.32 STAGE 3B CHRONIC KIDNEY DISEASE (HCC): ICD-10-CM

## 2022-01-01 DIAGNOSIS — E78.2 MIXED HYPERLIPIDEMIA: ICD-10-CM

## 2022-01-01 DIAGNOSIS — E87.5 HYPERKALEMIA: ICD-10-CM

## 2022-01-01 DIAGNOSIS — R53.83 OTHER FATIGUE: ICD-10-CM

## 2022-01-01 DIAGNOSIS — G89.29 CHRONIC PAIN OF LEFT KNEE: ICD-10-CM

## 2022-01-01 DIAGNOSIS — M17.12 PRIMARY OSTEOARTHRITIS OF LEFT KNEE: ICD-10-CM

## 2022-01-01 DIAGNOSIS — I50.9 CHRONIC CONGESTIVE HEART FAILURE, UNSPECIFIED HEART FAILURE TYPE (HCC): ICD-10-CM

## 2022-01-01 DIAGNOSIS — E53.8 VITAMIN B12 DEFICIENCY: ICD-10-CM

## 2022-01-01 DIAGNOSIS — R06.03 ACUTE RESPIRATORY DISTRESS: Primary | ICD-10-CM

## 2022-01-01 DIAGNOSIS — I50.42 CHRONIC COMBINED SYSTOLIC AND DIASTOLIC CONGESTIVE HEART FAILURE (HCC): ICD-10-CM

## 2022-01-01 DIAGNOSIS — M25.562 CHRONIC PAIN OF LEFT KNEE: ICD-10-CM

## 2022-01-01 DIAGNOSIS — F32.A ANXIETY AND DEPRESSION: Primary | ICD-10-CM

## 2022-01-01 DIAGNOSIS — I50.9 CHF EXACERBATION (HCC): ICD-10-CM

## 2022-01-01 DIAGNOSIS — I48.0 PAROXYSMAL ATRIAL FIBRILLATION (HCC): ICD-10-CM

## 2022-01-01 DIAGNOSIS — I48.92 ATRIAL FLUTTER, UNSPECIFIED TYPE (HCC): ICD-10-CM

## 2022-01-01 DIAGNOSIS — E11.9 NON-INSULIN DEPENDENT TYPE 2 DIABETES MELLITUS (HCC): Primary | ICD-10-CM

## 2022-01-01 DIAGNOSIS — F32.A ANXIETY AND DEPRESSION: ICD-10-CM

## 2022-01-01 DIAGNOSIS — D64.89 ANEMIA DUE TO OTHER CAUSE, NOT CLASSIFIED: ICD-10-CM

## 2022-01-01 DIAGNOSIS — I48.0 PAF (PAROXYSMAL ATRIAL FIBRILLATION) (HCC): ICD-10-CM

## 2022-01-01 DIAGNOSIS — I50.42 CHRONIC COMBINED SYSTOLIC AND DIASTOLIC CONGESTIVE HEART FAILURE (HCC): Primary | ICD-10-CM

## 2022-01-01 DIAGNOSIS — Z95.810 STATUS POST IMPLANTATION OF AUTOMATIC CARDIOVERTER/DEFIBRILLATOR (AICD): ICD-10-CM

## 2022-01-01 DIAGNOSIS — R06.02 SHORTNESS OF BREATH: ICD-10-CM

## 2022-01-01 DIAGNOSIS — F41.9 ANXIETY AND DEPRESSION: Primary | ICD-10-CM

## 2022-01-01 DIAGNOSIS — I10 HYPERTENSION, UNSPECIFIED TYPE: Primary | ICD-10-CM

## 2022-01-01 DIAGNOSIS — I11.0 HYPERTENSIVE HEART DISEASE WITH CONGESTIVE HEART FAILURE, UNSPECIFIED HEART FAILURE TYPE (HCC): ICD-10-CM

## 2022-01-01 DIAGNOSIS — I10 HYPERTENSION, UNSPECIFIED TYPE: ICD-10-CM

## 2022-01-01 DIAGNOSIS — E55.9 VITAMIN D DEFICIENCY: ICD-10-CM

## 2022-01-01 DIAGNOSIS — E11.9 NON-INSULIN DEPENDENT TYPE 2 DIABETES MELLITUS (HCC): ICD-10-CM

## 2022-01-01 DIAGNOSIS — N28.9 RENAL INSUFFICIENCY: ICD-10-CM

## 2022-01-01 DIAGNOSIS — M25.561 RIGHT KNEE PAIN, UNSPECIFIED CHRONICITY: ICD-10-CM

## 2022-01-01 DIAGNOSIS — G89.29 CHRONIC PAIN OF LEFT KNEE: Primary | ICD-10-CM

## 2022-01-01 DIAGNOSIS — I50.9 CHF EXACERBATION (HCC): Primary | ICD-10-CM

## 2022-01-01 DIAGNOSIS — F41.9 ANXIETY: ICD-10-CM

## 2022-01-01 DIAGNOSIS — J44.9 CHRONIC OBSTRUCTIVE PULMONARY DISEASE, UNSPECIFIED COPD TYPE (HCC): ICD-10-CM

## 2022-01-01 DIAGNOSIS — J41.0 SIMPLE CHRONIC BRONCHITIS (HCC): ICD-10-CM

## 2022-01-01 DIAGNOSIS — E87.1 HYPONATREMIA: ICD-10-CM

## 2022-01-01 DIAGNOSIS — I48.0 PAROXYSMAL ATRIAL FIBRILLATION (HCC): Primary | ICD-10-CM

## 2022-01-01 DIAGNOSIS — M25.562 CHRONIC PAIN OF LEFT KNEE: Primary | ICD-10-CM

## 2022-01-01 DIAGNOSIS — R73.9 HYPERGLYCEMIA: ICD-10-CM

## 2022-01-01 DIAGNOSIS — F41.9 ANXIETY AND DEPRESSION: ICD-10-CM

## 2022-01-01 DIAGNOSIS — R11.2 NAUSEA AND VOMITING: ICD-10-CM

## 2022-01-01 LAB
25(OH)D3 SERPL-MCNC: 37.5 NG/ML (ref 30–100)
2HR DELTA HS TROPONIN: -3 NG/L
2HR DELTA HS TROPONIN: 9 NG/L
4HR DELTA HS TROPONIN: 4 NG/L
4HR DELTA HS TROPONIN: 7 NG/L
ALBUMIN SERPL BCP-MCNC: 3.2 G/DL (ref 3.5–5)
ALBUMIN SERPL BCP-MCNC: 4.2 G/DL (ref 3.5–5)
ALP SERPL-CCNC: 68 U/L (ref 34–104)
ALP SERPL-CCNC: 76 U/L (ref 46–116)
ALT SERPL W P-5'-P-CCNC: 14 U/L (ref 7–52)
ALT SERPL W P-5'-P-CCNC: 16 U/L (ref 12–78)
ANION GAP SERPL CALCULATED.3IONS-SCNC: 5 MMOL/L (ref 4–13)
ANION GAP SERPL CALCULATED.3IONS-SCNC: 6 MMOL/L (ref 4–13)
ANION GAP SERPL CALCULATED.3IONS-SCNC: 7 MMOL/L (ref 4–13)
ANION GAP SERPL CALCULATED.3IONS-SCNC: 7 MMOL/L (ref 4–13)
ANION GAP SERPL CALCULATED.3IONS-SCNC: 8 MMOL/L (ref 4–13)
ANION GAP SERPL CALCULATED.3IONS-SCNC: 8 MMOL/L (ref 4–13)
APTT PPP: 26 SECONDS (ref 23–37)
AST SERPL W P-5'-P-CCNC: 14 U/L (ref 5–45)
AST SERPL W P-5'-P-CCNC: 17 U/L (ref 13–39)
ATRIAL RATE: 108 BPM
ATRIAL RATE: 217 BPM
BACTERIA BLD CULT: NORMAL
BACTERIA BLD CULT: NORMAL
BACTERIA UR QL AUTO: ABNORMAL /HPF
BASOPHILS # BLD AUTO: 0.01 THOUSANDS/ΜL (ref 0–0.1)
BASOPHILS # BLD AUTO: 0.02 THOUSANDS/ΜL (ref 0–0.1)
BASOPHILS # BLD AUTO: 0.02 THOUSANDS/ΜL (ref 0–0.1)
BASOPHILS # BLD AUTO: 0.03 THOUSANDS/ΜL (ref 0–0.1)
BASOPHILS # BLD AUTO: 0.04 THOUSANDS/ΜL (ref 0–0.1)
BASOPHILS NFR BLD AUTO: 0 % (ref 0–1)
BASOPHILS NFR BLD AUTO: 1 % (ref 0–1)
BILIRUB DIRECT SERPL-MCNC: 0.14 MG/DL (ref 0–0.2)
BILIRUB SERPL-MCNC: 0.49 MG/DL (ref 0.2–1)
BILIRUB SERPL-MCNC: 0.82 MG/DL (ref 0.2–1)
BILIRUB UR QL STRIP: NEGATIVE
BNP SERPL-MCNC: 983 PG/ML (ref 1–100)
BUN SERPL-MCNC: 23 MG/DL (ref 5–25)
BUN SERPL-MCNC: 24 MG/DL (ref 5–25)
BUN SERPL-MCNC: 24 MG/DL (ref 5–25)
BUN SERPL-MCNC: 28 MG/DL (ref 5–25)
BUN SERPL-MCNC: 30 MG/DL (ref 5–25)
BUN SERPL-MCNC: 34 MG/DL (ref 5–25)
BUN SERPL-MCNC: 39 MG/DL (ref 5–25)
BUN SERPL-MCNC: 44 MG/DL (ref 5–25)
CALCIUM ALBUM COR SERPL-MCNC: 9.7 MG/DL (ref 8.3–10.1)
CALCIUM SERPL-MCNC: 8.3 MG/DL (ref 8.3–10.1)
CALCIUM SERPL-MCNC: 8.4 MG/DL (ref 8.3–10.1)
CALCIUM SERPL-MCNC: 8.8 MG/DL (ref 8.3–10.1)
CALCIUM SERPL-MCNC: 8.8 MG/DL (ref 8.4–10.2)
CALCIUM SERPL-MCNC: 8.8 MG/DL (ref 8.4–10.2)
CALCIUM SERPL-MCNC: 8.9 MG/DL (ref 8.4–10.2)
CALCIUM SERPL-MCNC: 9.1 MG/DL (ref 8.3–10.1)
CALCIUM SERPL-MCNC: 9.5 MG/DL (ref 8.4–10.2)
CARDIAC TROPONIN I PNL SERPL HS: 20 NG/L
CARDIAC TROPONIN I PNL SERPL HS: 23 NG/L
CARDIAC TROPONIN I PNL SERPL HS: 26 NG/L
CARDIAC TROPONIN I PNL SERPL HS: 27 NG/L
CARDIAC TROPONIN I PNL SERPL HS: 29 NG/L
CARDIAC TROPONIN I PNL SERPL HS: 30 NG/L
CHLORIDE SERPL-SCNC: 100 MMOL/L (ref 100–108)
CHLORIDE SERPL-SCNC: 100 MMOL/L (ref 96–108)
CHLORIDE SERPL-SCNC: 101 MMOL/L (ref 100–108)
CHLORIDE SERPL-SCNC: 101 MMOL/L (ref 96–108)
CHLORIDE SERPL-SCNC: 102 MMOL/L (ref 100–108)
CHLORIDE SERPL-SCNC: 102 MMOL/L (ref 100–108)
CHLORIDE SERPL-SCNC: 102 MMOL/L (ref 96–108)
CHLORIDE SERPL-SCNC: 98 MMOL/L (ref 96–108)
CK SERPL-CCNC: 26 U/L (ref 26–192)
CLARITY UR: CLEAR
CO2 SERPL-SCNC: 26 MMOL/L (ref 21–32)
CO2 SERPL-SCNC: 27 MMOL/L (ref 21–32)
CO2 SERPL-SCNC: 29 MMOL/L (ref 21–32)
CO2 SERPL-SCNC: 29 MMOL/L (ref 21–32)
CO2 SERPL-SCNC: 30 MMOL/L (ref 21–32)
CO2 SERPL-SCNC: 31 MMOL/L (ref 21–32)
CO2 SERPL-SCNC: 33 MMOL/L (ref 21–32)
CO2 SERPL-SCNC: 34 MMOL/L (ref 21–32)
COLOR UR: YELLOW
CREAT SERPL-MCNC: 0.85 MG/DL (ref 0.6–1.3)
CREAT SERPL-MCNC: 1.1 MG/DL (ref 0.6–1.3)
CREAT SERPL-MCNC: 1.18 MG/DL (ref 0.6–1.3)
CREAT SERPL-MCNC: 1.2 MG/DL (ref 0.6–1.3)
CREAT SERPL-MCNC: 1.21 MG/DL (ref 0.6–1.3)
CREAT SERPL-MCNC: 1.24 MG/DL (ref 0.6–1.3)
CREAT SERPL-MCNC: 1.33 MG/DL (ref 0.6–1.3)
CREAT SERPL-MCNC: 1.41 MG/DL (ref 0.6–1.3)
EOSINOPHIL # BLD AUTO: 0 THOUSAND/ΜL (ref 0–0.61)
EOSINOPHIL NFR BLD AUTO: 0 % (ref 0–6)
ERYTHROCYTE [DISTWIDTH] IN BLOOD BY AUTOMATED COUNT: 14.2 % (ref 11.6–15.1)
ERYTHROCYTE [DISTWIDTH] IN BLOOD BY AUTOMATED COUNT: 14.2 % (ref 11.6–15.1)
ERYTHROCYTE [DISTWIDTH] IN BLOOD BY AUTOMATED COUNT: 14.3 % (ref 11.6–15.1)
ERYTHROCYTE [DISTWIDTH] IN BLOOD BY AUTOMATED COUNT: 14.4 % (ref 11.6–15.1)
ERYTHROCYTE [DISTWIDTH] IN BLOOD BY AUTOMATED COUNT: 16.4 % (ref 11.6–15.1)
ERYTHROCYTE [DISTWIDTH] IN BLOOD BY AUTOMATED COUNT: 16.7 % (ref 11.6–15.1)
EST. AVERAGE GLUCOSE BLD GHB EST-MCNC: 137 MG/DL
FERRITIN SERPL-MCNC: 28 NG/ML (ref 8–388)
FLUAV RNA RESP QL NAA+PROBE: POSITIVE
FLUBV RNA RESP QL NAA+PROBE: NEGATIVE
FOLATE SERPL-MCNC: 19.9 NG/ML (ref 3.1–17.5)
GFR SERPL CREATININE-BSD FRML MDRD: 33 ML/MIN/1.73SQ M
GFR SERPL CREATININE-BSD FRML MDRD: 36 ML/MIN/1.73SQ M
GFR SERPL CREATININE-BSD FRML MDRD: 39 ML/MIN/1.73SQ M
GFR SERPL CREATININE-BSD FRML MDRD: 40 ML/MIN/1.73SQ M
GFR SERPL CREATININE-BSD FRML MDRD: 41 ML/MIN/1.73SQ M
GFR SERPL CREATININE-BSD FRML MDRD: 42 ML/MIN/1.73SQ M
GFR SERPL CREATININE-BSD FRML MDRD: 45 ML/MIN/1.73SQ M
GFR SERPL CREATININE-BSD FRML MDRD: 62 ML/MIN/1.73SQ M
GLUCOSE P FAST SERPL-MCNC: 102 MG/DL (ref 65–99)
GLUCOSE SERPL-MCNC: 103 MG/DL (ref 65–140)
GLUCOSE SERPL-MCNC: 106 MG/DL (ref 65–140)
GLUCOSE SERPL-MCNC: 115 MG/DL (ref 65–140)
GLUCOSE SERPL-MCNC: 130 MG/DL (ref 65–140)
GLUCOSE SERPL-MCNC: 137 MG/DL (ref 65–140)
GLUCOSE SERPL-MCNC: 189 MG/DL (ref 65–140)
GLUCOSE SERPL-MCNC: 77 MG/DL (ref 65–140)
GLUCOSE UR STRIP-MCNC: NEGATIVE MG/DL
HBA1C MFR BLD: 6.4 %
HCT VFR BLD AUTO: 33.5 % (ref 34.8–46.1)
HCT VFR BLD AUTO: 38.6 % (ref 34.8–46.1)
HCT VFR BLD AUTO: 39 % (ref 34.8–46.1)
HCT VFR BLD AUTO: 44.3 % (ref 34.8–46.1)
HCT VFR BLD AUTO: 44.4 % (ref 34.8–46.1)
HCT VFR BLD AUTO: 49.5 % (ref 34.8–46.1)
HGB BLD-MCNC: 10.3 G/DL (ref 11.5–15.4)
HGB BLD-MCNC: 11.9 G/DL (ref 11.5–15.4)
HGB BLD-MCNC: 12 G/DL (ref 11.5–15.4)
HGB BLD-MCNC: 13 G/DL (ref 11.5–15.4)
HGB BLD-MCNC: 13.5 G/DL (ref 11.5–15.4)
HGB BLD-MCNC: 15.2 G/DL (ref 11.5–15.4)
HGB UR QL STRIP.AUTO: ABNORMAL
IMM GRANULOCYTES # BLD AUTO: 0.03 THOUSAND/UL (ref 0–0.2)
IMM GRANULOCYTES # BLD AUTO: 0.05 THOUSAND/UL (ref 0–0.2)
IMM GRANULOCYTES # BLD AUTO: 0.05 THOUSAND/UL (ref 0–0.2)
IMM GRANULOCYTES # BLD AUTO: 0.11 THOUSAND/UL (ref 0–0.2)
IMM GRANULOCYTES # BLD AUTO: 0.14 THOUSAND/UL (ref 0–0.2)
IMM GRANULOCYTES NFR BLD AUTO: 0 % (ref 0–2)
IMM GRANULOCYTES NFR BLD AUTO: 1 % (ref 0–2)
INR PPP: 1.07 (ref 0.84–1.19)
KETONES UR STRIP-MCNC: NEGATIVE MG/DL
LACTATE SERPL-SCNC: 1.5 MMOL/L (ref 0.5–2)
LEUKOCYTE ESTERASE UR QL STRIP: NEGATIVE
LYMPHOCYTES # BLD AUTO: 0.79 THOUSANDS/ΜL (ref 0.6–4.47)
LYMPHOCYTES # BLD AUTO: 0.9 THOUSANDS/ΜL (ref 0.6–4.47)
LYMPHOCYTES # BLD AUTO: 0.96 THOUSANDS/ΜL (ref 0.6–4.47)
LYMPHOCYTES # BLD AUTO: 1.11 THOUSANDS/ΜL (ref 0.6–4.47)
LYMPHOCYTES # BLD AUTO: 1.75 THOUSANDS/ΜL (ref 0.6–4.47)
LYMPHOCYTES NFR BLD AUTO: 10 % (ref 14–44)
LYMPHOCYTES NFR BLD AUTO: 12 % (ref 14–44)
LYMPHOCYTES NFR BLD AUTO: 20 % (ref 14–44)
LYMPHOCYTES NFR BLD AUTO: 26 % (ref 14–44)
LYMPHOCYTES NFR BLD AUTO: 9 % (ref 14–44)
MAGNESIUM SERPL-MCNC: 1.9 MG/DL (ref 1.6–2.6)
MCH RBC QN AUTO: 25.4 PG (ref 26.8–34.3)
MCH RBC QN AUTO: 25.5 PG (ref 26.8–34.3)
MCH RBC QN AUTO: 25.6 PG (ref 26.8–34.3)
MCH RBC QN AUTO: 25.7 PG (ref 26.8–34.3)
MCH RBC QN AUTO: 26.8 PG (ref 26.8–34.3)
MCH RBC QN AUTO: 27 PG (ref 26.8–34.3)
MCHC RBC AUTO-ENTMCNC: 29.3 G/DL (ref 31.4–37.4)
MCHC RBC AUTO-ENTMCNC: 30.5 G/DL (ref 31.4–37.4)
MCHC RBC AUTO-ENTMCNC: 30.5 G/DL (ref 31.4–37.4)
MCHC RBC AUTO-ENTMCNC: 30.7 G/DL (ref 31.4–37.4)
MCHC RBC AUTO-ENTMCNC: 30.7 G/DL (ref 31.4–37.4)
MCHC RBC AUTO-ENTMCNC: 31.1 G/DL (ref 31.4–37.4)
MCV RBC AUTO: 83 FL (ref 82–98)
MCV RBC AUTO: 83 FL (ref 82–98)
MCV RBC AUTO: 84 FL (ref 82–98)
MCV RBC AUTO: 87 FL (ref 82–98)
MCV RBC AUTO: 87 FL (ref 82–98)
MCV RBC AUTO: 89 FL (ref 82–98)
MONOCYTES # BLD AUTO: 0.12 THOUSAND/ΜL (ref 0.17–1.22)
MONOCYTES # BLD AUTO: 0.56 THOUSAND/ΜL (ref 0.17–1.22)
MONOCYTES # BLD AUTO: 0.6 THOUSAND/ΜL (ref 0.17–1.22)
MONOCYTES # BLD AUTO: 0.69 THOUSAND/ΜL (ref 0.17–1.22)
MONOCYTES # BLD AUTO: 0.69 THOUSAND/ΜL (ref 0.17–1.22)
MONOCYTES NFR BLD AUTO: 13 % (ref 4–12)
MONOCYTES NFR BLD AUTO: 2 % (ref 4–12)
MONOCYTES NFR BLD AUTO: 6 % (ref 4–12)
MONOCYTES NFR BLD AUTO: 6 % (ref 4–12)
MONOCYTES NFR BLD AUTO: 9 % (ref 4–12)
NEUTROPHILS # BLD AUTO: 2.9 THOUSANDS/ΜL (ref 1.85–7.62)
NEUTROPHILS # BLD AUTO: 4.27 THOUSANDS/ΜL (ref 1.85–7.62)
NEUTROPHILS # BLD AUTO: 5.94 THOUSANDS/ΜL (ref 1.85–7.62)
NEUTROPHILS # BLD AUTO: 9.02 THOUSANDS/ΜL (ref 1.85–7.62)
NEUTROPHILS # BLD AUTO: 9.06 THOUSANDS/ΜL (ref 1.85–7.62)
NEUTS SEG NFR BLD AUTO: 64 % (ref 43–75)
NEUTS SEG NFR BLD AUTO: 65 % (ref 43–75)
NEUTS SEG NFR BLD AUTO: 83 % (ref 43–75)
NEUTS SEG NFR BLD AUTO: 84 % (ref 43–75)
NEUTS SEG NFR BLD AUTO: 86 % (ref 43–75)
NITRITE UR QL STRIP: NEGATIVE
NON-SQ EPI CELLS URNS QL MICRO: ABNORMAL /HPF
NRBC BLD AUTO-RTO: 0 /100 WBCS
NT-PROBNP SERPL-MCNC: 7413 PG/ML
P AXIS: 98 DEGREES
PH UR STRIP.AUTO: 5.5 [PH]
PLATELET # BLD AUTO: 177 THOUSANDS/UL (ref 149–390)
PLATELET # BLD AUTO: 179 THOUSANDS/UL (ref 149–390)
PLATELET # BLD AUTO: 182 THOUSANDS/UL (ref 149–390)
PLATELET # BLD AUTO: 186 THOUSANDS/UL (ref 149–390)
PLATELET # BLD AUTO: 219 THOUSANDS/UL (ref 149–390)
PLATELET # BLD AUTO: 246 THOUSANDS/UL (ref 149–390)
PMV BLD AUTO: 10.3 FL (ref 8.9–12.7)
PMV BLD AUTO: 10.3 FL (ref 8.9–12.7)
PMV BLD AUTO: 10.4 FL (ref 8.9–12.7)
PMV BLD AUTO: 11.2 FL (ref 8.9–12.7)
PMV BLD AUTO: 9.9 FL (ref 8.9–12.7)
PMV BLD AUTO: 9.9 FL (ref 8.9–12.7)
POTASSIUM SERPL-SCNC: 3.7 MMOL/L (ref 3.5–5.3)
POTASSIUM SERPL-SCNC: 4.1 MMOL/L (ref 3.5–5.3)
POTASSIUM SERPL-SCNC: 4.3 MMOL/L (ref 3.5–5.3)
POTASSIUM SERPL-SCNC: 4.7 MMOL/L (ref 3.5–5.3)
POTASSIUM SERPL-SCNC: 4.9 MMOL/L (ref 3.5–5.3)
POTASSIUM SERPL-SCNC: 5 MMOL/L (ref 3.5–5.3)
POTASSIUM SERPL-SCNC: 5.3 MMOL/L (ref 3.5–5.3)
POTASSIUM SERPL-SCNC: 5.4 MMOL/L (ref 3.5–5.3)
POTASSIUM SERPL-SCNC: 6 MMOL/L (ref 3.5–5.3)
PR INTERVAL: 187 MS
PROCALCITONIN SERPL-MCNC: <0.05 NG/ML
PROT SERPL-MCNC: 7.4 G/DL (ref 6.4–8.2)
PROT SERPL-MCNC: 7.9 G/DL (ref 6.4–8.4)
PROT UR STRIP-MCNC: NEGATIVE MG/DL
PROTHROMBIN TIME: 13.8 SECONDS (ref 11.6–14.5)
QRS AXIS: -36 DEGREES
QRS AXIS: -46 DEGREES
QRSD INTERVAL: 113 MS
QRSD INTERVAL: 98 MS
QT INTERVAL: 362 MS
QT INTERVAL: 379 MS
QTC INTERVAL: 467 MS
QTC INTERVAL: 483 MS
RBC # BLD AUTO: 3.85 MILLION/UL (ref 3.81–5.12)
RBC # BLD AUTO: 4.4 MILLION/UL (ref 3.81–5.12)
RBC # BLD AUTO: 4.67 MILLION/UL (ref 3.81–5.12)
RBC # BLD AUTO: 5.12 MILLION/UL (ref 3.81–5.12)
RBC # BLD AUTO: 5.3 MILLION/UL (ref 3.81–5.12)
RBC # BLD AUTO: 5.94 MILLION/UL (ref 3.81–5.12)
RBC #/AREA URNS AUTO: ABNORMAL /HPF
RSV RNA RESP QL NAA+PROBE: NEGATIVE
SARS-COV-2 RNA RESP QL NAA+PROBE: NEGATIVE
SODIUM SERPL-SCNC: 134 MMOL/L (ref 135–147)
SODIUM SERPL-SCNC: 136 MMOL/L (ref 135–147)
SODIUM SERPL-SCNC: 136 MMOL/L (ref 136–145)
SODIUM SERPL-SCNC: 139 MMOL/L (ref 136–145)
SODIUM SERPL-SCNC: 140 MMOL/L (ref 136–145)
SODIUM SERPL-SCNC: 141 MMOL/L (ref 136–145)
SP GR UR STRIP.AUTO: 1.02 (ref 1–1.03)
T WAVE AXIS: 137 DEGREES
T WAVE AXIS: 155 DEGREES
UROBILINOGEN UR QL STRIP.AUTO: 0.2 E.U./DL
VENTRICULAR RATE: 107 BPM
VENTRICULAR RATE: 91 BPM
VIT B12 SERPL-MCNC: 725 PG/ML (ref 100–900)
WBC # BLD AUTO: 10.84 THOUSAND/UL (ref 4.31–10.16)
WBC # BLD AUTO: 11.01 THOUSAND/UL (ref 4.31–10.16)
WBC # BLD AUTO: 4.43 THOUSAND/UL (ref 4.31–10.16)
WBC # BLD AUTO: 6.69 THOUSAND/UL (ref 4.31–10.16)
WBC # BLD AUTO: 6.89 THOUSAND/UL (ref 4.31–10.16)
WBC # BLD AUTO: 6.98 THOUSAND/UL (ref 4.31–10.16)
WBC #/AREA URNS AUTO: ABNORMAL /HPF

## 2022-01-01 PROCEDURE — 99214 OFFICE O/P EST MOD 30 MIN: CPT | Performed by: INTERNAL MEDICINE

## 2022-01-01 PROCEDURE — 99239 HOSP IP/OBS DSCHRG MGMT >30: CPT | Performed by: INTERNAL MEDICINE

## 2022-01-01 PROCEDURE — 80048 BASIC METABOLIC PNL TOTAL CA: CPT | Performed by: INTERNAL MEDICINE

## 2022-01-01 PROCEDURE — 99232 SBSQ HOSP IP/OBS MODERATE 35: CPT | Performed by: INTERNAL MEDICINE

## 2022-01-01 PROCEDURE — 82550 ASSAY OF CK (CPK): CPT | Performed by: INTERNAL MEDICINE

## 2022-01-01 PROCEDURE — 97167 OT EVAL HIGH COMPLEX 60 MIN: CPT

## 2022-01-01 PROCEDURE — 99223 1ST HOSP IP/OBS HIGH 75: CPT | Performed by: INTERNAL MEDICINE

## 2022-01-01 PROCEDURE — 99291 CRITICAL CARE FIRST HOUR: CPT | Performed by: EMERGENCY MEDICINE

## 2022-01-01 PROCEDURE — 83036 HEMOGLOBIN GLYCOSYLATED A1C: CPT

## 2022-01-01 PROCEDURE — 85025 COMPLETE CBC W/AUTO DIFF WBC: CPT | Performed by: EMERGENCY MEDICINE

## 2022-01-01 PROCEDURE — 85025 COMPLETE CBC W/AUTO DIFF WBC: CPT | Performed by: INTERNAL MEDICINE

## 2022-01-01 PROCEDURE — 82306 VITAMIN D 25 HYDROXY: CPT

## 2022-01-01 PROCEDURE — 85027 COMPLETE CBC AUTOMATED: CPT | Performed by: INTERNAL MEDICINE

## 2022-01-01 PROCEDURE — 71045 X-RAY EXAM CHEST 1 VIEW: CPT

## 2022-01-01 PROCEDURE — 83880 ASSAY OF NATRIURETIC PEPTIDE: CPT | Performed by: EMERGENCY MEDICINE

## 2022-01-01 PROCEDURE — 82607 VITAMIN B-12: CPT

## 2022-01-01 PROCEDURE — 99285 EMERGENCY DEPT VISIT HI MDM: CPT

## 2022-01-01 PROCEDURE — 99232 SBSQ HOSP IP/OBS MODERATE 35: CPT | Performed by: STUDENT IN AN ORGANIZED HEALTH CARE EDUCATION/TRAINING PROGRAM

## 2022-01-01 PROCEDURE — 96365 THER/PROPH/DIAG IV INF INIT: CPT

## 2022-01-01 PROCEDURE — 93000 ELECTROCARDIOGRAM COMPLETE: CPT | Performed by: INTERNAL MEDICINE

## 2022-01-01 PROCEDURE — 84145 PROCALCITONIN (PCT): CPT | Performed by: EMERGENCY MEDICINE

## 2022-01-01 PROCEDURE — 94760 N-INVAS EAR/PLS OXIMETRY 1: CPT

## 2022-01-01 PROCEDURE — 36415 COLL VENOUS BLD VENIPUNCTURE: CPT

## 2022-01-01 PROCEDURE — 71250 CT THORAX DX C-: CPT

## 2022-01-01 PROCEDURE — 99203 OFFICE O/P NEW LOW 30 MIN: CPT | Performed by: PHYSICIAN ASSISTANT

## 2022-01-01 PROCEDURE — 83605 ASSAY OF LACTIC ACID: CPT | Performed by: EMERGENCY MEDICINE

## 2022-01-01 PROCEDURE — 97116 GAIT TRAINING THERAPY: CPT

## 2022-01-01 PROCEDURE — 94640 AIRWAY INHALATION TREATMENT: CPT

## 2022-01-01 PROCEDURE — 36415 COLL VENOUS BLD VENIPUNCTURE: CPT | Performed by: EMERGENCY MEDICINE

## 2022-01-01 PROCEDURE — 84484 ASSAY OF TROPONIN QUANT: CPT | Performed by: EMERGENCY MEDICINE

## 2022-01-01 PROCEDURE — 99232 SBSQ HOSP IP/OBS MODERATE 35: CPT | Performed by: HOSPITALIST

## 2022-01-01 PROCEDURE — 80048 BASIC METABOLIC PNL TOTAL CA: CPT | Performed by: EMERGENCY MEDICINE

## 2022-01-01 PROCEDURE — 93010 ELECTROCARDIOGRAM REPORT: CPT | Performed by: INTERNAL MEDICINE

## 2022-01-01 PROCEDURE — 0241U HB NFCT DS VIR RESP RNA 4 TRGT: CPT | Performed by: EMERGENCY MEDICINE

## 2022-01-01 PROCEDURE — 81001 URINALYSIS AUTO W/SCOPE: CPT | Performed by: EMERGENCY MEDICINE

## 2022-01-01 PROCEDURE — 85610 PROTHROMBIN TIME: CPT | Performed by: EMERGENCY MEDICINE

## 2022-01-01 PROCEDURE — 99222 1ST HOSP IP/OBS MODERATE 55: CPT | Performed by: INTERNAL MEDICINE

## 2022-01-01 PROCEDURE — 81003 URINALYSIS AUTO W/O SCOPE: CPT | Performed by: EMERGENCY MEDICINE

## 2022-01-01 PROCEDURE — 93005 ELECTROCARDIOGRAM TRACING: CPT

## 2022-01-01 PROCEDURE — G0425 INPT/ED TELECONSULT30: HCPCS | Performed by: NURSE PRACTITIONER

## 2022-01-01 PROCEDURE — 82728 ASSAY OF FERRITIN: CPT

## 2022-01-01 PROCEDURE — 87040 BLOOD CULTURE FOR BACTERIA: CPT | Performed by: EMERGENCY MEDICINE

## 2022-01-01 PROCEDURE — 99204 OFFICE O/P NEW MOD 45 MIN: CPT | Performed by: INTERNAL MEDICINE

## 2022-01-01 PROCEDURE — 83735 ASSAY OF MAGNESIUM: CPT | Performed by: EMERGENCY MEDICINE

## 2022-01-01 PROCEDURE — 20610 DRAIN/INJ JOINT/BURSA W/O US: CPT | Performed by: PHYSICIAN ASSISTANT

## 2022-01-01 PROCEDURE — 99285 EMERGENCY DEPT VISIT HI MDM: CPT | Performed by: EMERGENCY MEDICINE

## 2022-01-01 PROCEDURE — 80076 HEPATIC FUNCTION PANEL: CPT | Performed by: EMERGENCY MEDICINE

## 2022-01-01 PROCEDURE — 85730 THROMBOPLASTIN TIME PARTIAL: CPT | Performed by: EMERGENCY MEDICINE

## 2022-01-01 PROCEDURE — 82746 ASSAY OF FOLIC ACID SERUM: CPT

## 2022-01-01 PROCEDURE — 96375 TX/PRO/DX INJ NEW DRUG ADDON: CPT

## 2022-01-01 PROCEDURE — 80053 COMPREHEN METABOLIC PANEL: CPT

## 2022-01-01 PROCEDURE — 73562 X-RAY EXAM OF KNEE 3: CPT

## 2022-01-01 PROCEDURE — 94644 CONT INHLJ TX 1ST HOUR: CPT

## 2022-01-01 PROCEDURE — 85025 COMPLETE CBC W/AUTO DIFF WBC: CPT

## 2022-01-01 PROCEDURE — 94664 DEMO&/EVAL PT USE INHALER: CPT

## 2022-01-01 PROCEDURE — 94002 VENT MGMT INPAT INIT DAY: CPT

## 2022-01-01 PROCEDURE — 97110 THERAPEUTIC EXERCISES: CPT

## 2022-01-01 PROCEDURE — 80048 BASIC METABOLIC PNL TOTAL CA: CPT | Performed by: HOSPITALIST

## 2022-01-01 PROCEDURE — 84132 ASSAY OF SERUM POTASSIUM: CPT | Performed by: INTERNAL MEDICINE

## 2022-01-01 PROCEDURE — 96374 THER/PROPH/DIAG INJ IV PUSH: CPT

## 2022-01-01 PROCEDURE — 97163 PT EVAL HIGH COMPLEX 45 MIN: CPT

## 2022-01-01 RX ORDER — CHOLECALCIFEROL (VITAMIN D3) 125 MCG
1 CAPSULE ORAL DAILY
COMMUNITY

## 2022-01-01 RX ORDER — FUROSEMIDE 10 MG/ML
20 INJECTION INTRAMUSCULAR; INTRAVENOUS 2 TIMES DAILY
Status: DISCONTINUED | OUTPATIENT
Start: 2022-01-01 | End: 2022-01-01

## 2022-01-01 RX ORDER — LORAZEPAM 1 MG/1
0.5 TABLET ORAL EVERY 4 HOURS PRN
Qty: 15 TABLET | Refills: 0 | Status: SHIPPED | OUTPATIENT
Start: 2022-01-01

## 2022-01-01 RX ORDER — FUROSEMIDE 40 MG/1
40 TABLET ORAL DAILY
Status: DISCONTINUED | OUTPATIENT
Start: 2022-01-01 | End: 2022-01-01 | Stop reason: HOSPADM

## 2022-01-01 RX ORDER — TRIAMCINOLONE ACETONIDE 40 MG/ML
80 INJECTION, SUSPENSION INTRA-ARTICULAR; INTRAMUSCULAR
Status: COMPLETED | OUTPATIENT
Start: 2022-01-01 | End: 2022-01-01

## 2022-01-01 RX ORDER — CARVEDILOL 6.25 MG/1
6.25 TABLET ORAL 2 TIMES DAILY WITH MEALS
Qty: 180 TABLET | Refills: 3 | Status: SHIPPED | OUTPATIENT
Start: 2022-01-01 | End: 2022-01-01 | Stop reason: HOSPADM

## 2022-01-01 RX ORDER — ONDANSETRON 2 MG/ML
4 INJECTION INTRAMUSCULAR; INTRAVENOUS EVERY 6 HOURS PRN
Status: DISCONTINUED | OUTPATIENT
Start: 2022-01-01 | End: 2022-01-01 | Stop reason: HOSPADM

## 2022-01-01 RX ORDER — OSELTAMIVIR PHOSPHATE 30 MG/1
30 CAPSULE ORAL ONCE
Status: COMPLETED | OUTPATIENT
Start: 2022-01-01 | End: 2022-01-01

## 2022-01-01 RX ORDER — AMIODARONE HYDROCHLORIDE 200 MG/1
200 TABLET ORAL DAILY
Status: DISCONTINUED | OUTPATIENT
Start: 2022-01-01 | End: 2022-01-01 | Stop reason: HOSPADM

## 2022-01-01 RX ORDER — SODIUM CHLORIDE 9 MG/ML
3 INJECTION INTRAVENOUS
Status: DISCONTINUED | OUTPATIENT
Start: 2022-01-01 | End: 2022-01-01 | Stop reason: HOSPADM

## 2022-01-01 RX ORDER — ALBUTEROL SULFATE 2.5 MG/3ML
5 SOLUTION RESPIRATORY (INHALATION) ONCE
Status: COMPLETED | OUTPATIENT
Start: 2022-01-01 | End: 2022-01-01

## 2022-01-01 RX ORDER — LIDOCAINE HYDROCHLORIDE 10 MG/ML
2 INJECTION, SOLUTION INFILTRATION; PERINEURAL
Status: COMPLETED | OUTPATIENT
Start: 2022-01-01 | End: 2022-01-01

## 2022-01-01 RX ORDER — METOPROLOL SUCCINATE 50 MG/1
50 TABLET, EXTENDED RELEASE ORAL DAILY
Status: DISCONTINUED | OUTPATIENT
Start: 2022-01-01 | End: 2022-01-01 | Stop reason: HOSPADM

## 2022-01-01 RX ORDER — ESCITALOPRAM OXALATE 10 MG/1
10 TABLET ORAL DAILY
Status: DISCONTINUED | OUTPATIENT
Start: 2022-01-01 | End: 2022-01-01 | Stop reason: HOSPADM

## 2022-01-01 RX ORDER — OSELTAMIVIR PHOSPHATE 30 MG/1
30 CAPSULE ORAL EVERY 24 HOURS
Status: DISCONTINUED | OUTPATIENT
Start: 2022-01-01 | End: 2022-01-01 | Stop reason: HOSPADM

## 2022-01-01 RX ORDER — FUROSEMIDE 10 MG/ML
20 INJECTION INTRAMUSCULAR; INTRAVENOUS ONCE
Status: COMPLETED | OUTPATIENT
Start: 2022-01-01 | End: 2022-01-01

## 2022-01-01 RX ORDER — PREDNISONE 10 MG/1
20 TABLET ORAL DAILY
Qty: 10 TABLET | Refills: 0 | Status: SHIPPED | OUTPATIENT
Start: 2022-01-01 | End: 2022-01-01

## 2022-01-01 RX ORDER — CEFTRIAXONE 2 G/50ML
2000 INJECTION, SOLUTION INTRAVENOUS ONCE
Status: COMPLETED | OUTPATIENT
Start: 2022-01-01 | End: 2022-01-01

## 2022-01-01 RX ORDER — METHYLPREDNISOLONE SODIUM SUCCINATE 40 MG/ML
40 INJECTION, POWDER, LYOPHILIZED, FOR SOLUTION INTRAMUSCULAR; INTRAVENOUS EVERY 12 HOURS SCHEDULED
Status: DISCONTINUED | OUTPATIENT
Start: 2022-01-01 | End: 2022-01-01 | Stop reason: HOSPADM

## 2022-01-01 RX ORDER — ISOSORBIDE MONONITRATE 30 MG/1
30 TABLET, EXTENDED RELEASE ORAL DAILY
COMMUNITY

## 2022-01-01 RX ORDER — AMIODARONE HYDROCHLORIDE 200 MG/1
200 TABLET ORAL DAILY
Qty: 90 TABLET | Refills: 3 | Status: SHIPPED | OUTPATIENT
Start: 2022-01-01

## 2022-01-01 RX ORDER — LOSARTAN POTASSIUM 25 MG/1
25 TABLET ORAL DAILY
Status: DISCONTINUED | OUTPATIENT
Start: 2022-01-01 | End: 2022-01-01 | Stop reason: HOSPADM

## 2022-01-01 RX ORDER — ALBUTEROL SULFATE 90 UG/1
2 AEROSOL, METERED RESPIRATORY (INHALATION) EVERY 6 HOURS PRN
COMMUNITY

## 2022-01-01 RX ORDER — ACETAMINOPHEN 325 MG/1
650 TABLET ORAL EVERY 6 HOURS PRN
Status: DISCONTINUED | OUTPATIENT
Start: 2022-01-01 | End: 2022-01-01 | Stop reason: HOSPADM

## 2022-01-01 RX ORDER — LOSARTAN POTASSIUM 25 MG/1
25 TABLET ORAL DAILY
Qty: 30 TABLET | Refills: 0 | Status: SHIPPED | OUTPATIENT
Start: 2022-01-01

## 2022-01-01 RX ORDER — FUROSEMIDE 20 MG/1
20 TABLET ORAL DAILY
Status: DISCONTINUED | OUTPATIENT
Start: 2022-01-01 | End: 2022-01-01 | Stop reason: HOSPADM

## 2022-01-01 RX ORDER — METOPROLOL SUCCINATE 50 MG/1
50 TABLET, EXTENDED RELEASE ORAL DAILY
Qty: 30 TABLET | Refills: 0 | Status: SHIPPED | OUTPATIENT
Start: 2022-01-01 | End: 2022-05-24

## 2022-01-01 RX ORDER — LEVALBUTEROL INHALATION SOLUTION 0.63 MG/3ML
0.63 SOLUTION RESPIRATORY (INHALATION)
Status: DISCONTINUED | OUTPATIENT
Start: 2022-01-01 | End: 2022-01-01 | Stop reason: HOSPADM

## 2022-01-01 RX ORDER — CALCIUM GLUCONATE 20 MG/ML
1 INJECTION, SOLUTION INTRAVENOUS ONCE
Status: COMPLETED | OUTPATIENT
Start: 2022-01-01 | End: 2022-01-01

## 2022-01-01 RX ORDER — ISOSORBIDE MONONITRATE 30 MG/1
30 TABLET, EXTENDED RELEASE ORAL DAILY
Status: DISCONTINUED | OUTPATIENT
Start: 2022-01-01 | End: 2022-01-01 | Stop reason: HOSPADM

## 2022-01-01 RX ORDER — SPIRONOLACTONE 25 MG/1
25 TABLET ORAL DAILY
Status: DISCONTINUED | OUTPATIENT
Start: 2022-01-01 | End: 2022-01-01

## 2022-01-01 RX ORDER — LANOLIN ALCOHOL/MO/W.PET/CERES
1 CREAM (GRAM) TOPICAL
COMMUNITY

## 2022-01-01 RX ORDER — FUROSEMIDE 40 MG/1
40 TABLET ORAL
Status: DISCONTINUED | OUTPATIENT
Start: 2022-01-01 | End: 2022-01-01

## 2022-01-01 RX ORDER — ESCITALOPRAM OXALATE 10 MG/1
10 TABLET ORAL DAILY
Qty: 30 TABLET | Refills: 0 | Status: SHIPPED | OUTPATIENT
Start: 2022-01-01 | End: 2022-01-01 | Stop reason: SDUPTHER

## 2022-01-01 RX ORDER — FUROSEMIDE 40 MG/1
40 TABLET ORAL DAILY
Qty: 30 TABLET | Refills: 0 | Status: SHIPPED | OUTPATIENT
Start: 2022-01-01

## 2022-01-01 RX ORDER — CARVEDILOL 6.25 MG/1
6.25 TABLET ORAL 2 TIMES DAILY WITH MEALS
Status: DISCONTINUED | OUTPATIENT
Start: 2022-01-01 | End: 2022-01-01

## 2022-01-01 RX ORDER — DEXTROSE MONOHYDRATE 25 G/50ML
25 INJECTION, SOLUTION INTRAVENOUS ONCE
Status: COMPLETED | OUTPATIENT
Start: 2022-01-01 | End: 2022-01-01

## 2022-01-01 RX ORDER — OXYCODONE HCL 20 MG/ML
CONCENTRATE, ORAL ORAL
Qty: 15 ML | Refills: 0 | Status: SHIPPED | OUTPATIENT
Start: 2022-01-01

## 2022-01-01 RX ORDER — ESCITALOPRAM OXALATE 10 MG/1
10 TABLET ORAL DAILY
Qty: 30 TABLET | Refills: 1 | Status: SHIPPED | OUTPATIENT
Start: 2022-01-01 | End: 2022-01-01 | Stop reason: SDUPTHER

## 2022-01-01 RX ORDER — FUROSEMIDE 10 MG/ML
40 INJECTION INTRAMUSCULAR; INTRAVENOUS
Status: DISCONTINUED | OUTPATIENT
Start: 2022-01-01 | End: 2022-01-01

## 2022-01-01 RX ORDER — ALBUTEROL SULFATE 90 UG/1
2 AEROSOL, METERED RESPIRATORY (INHALATION) EVERY 6 HOURS PRN
Status: DISCONTINUED | OUTPATIENT
Start: 2022-01-01 | End: 2022-01-01 | Stop reason: HOSPADM

## 2022-01-01 RX ORDER — FUROSEMIDE 20 MG/1
20 TABLET ORAL EVERY OTHER DAY
Qty: 15 TABLET | Refills: 0 | Status: SHIPPED | OUTPATIENT
Start: 2022-01-01 | End: 2022-01-01 | Stop reason: HOSPADM

## 2022-01-01 RX ORDER — SODIUM CHLORIDE FOR INHALATION 0.9 %
12 VIAL, NEBULIZER (ML) INHALATION ONCE
Status: DISCONTINUED | OUTPATIENT
Start: 2022-01-01 | End: 2022-01-01

## 2022-01-01 RX ORDER — NYSTATIN 100000 [USP'U]/G
POWDER TOPICAL 2 TIMES DAILY
Status: DISCONTINUED | OUTPATIENT
Start: 2022-01-01 | End: 2022-01-01 | Stop reason: HOSPADM

## 2022-01-01 RX ORDER — ONDANSETRON 2 MG/ML
4 INJECTION INTRAMUSCULAR; INTRAVENOUS ONCE
Status: COMPLETED | OUTPATIENT
Start: 2022-01-01 | End: 2022-01-01

## 2022-01-01 RX ORDER — SODIUM CHLORIDE 9 MG/ML
50 INJECTION, SOLUTION INTRAVENOUS CONTINUOUS
Status: DISCONTINUED | OUTPATIENT
Start: 2022-01-01 | End: 2022-01-01

## 2022-01-01 RX ORDER — ALBUTEROL SULFATE 2.5 MG/3ML
10 SOLUTION RESPIRATORY (INHALATION) ONCE
Status: COMPLETED | OUTPATIENT
Start: 2022-01-01 | End: 2022-01-01

## 2022-01-01 RX ORDER — ESCITALOPRAM OXALATE 10 MG/1
10 TABLET ORAL DAILY
Qty: 90 TABLET | Refills: 1 | Status: SHIPPED | OUTPATIENT
Start: 2022-01-01 | End: 2022-01-01

## 2022-01-01 RX ORDER — SODIUM POLYSTYRENE SULFONATE 4.1 MEQ/G
15 POWDER, FOR SUSPENSION ORAL; RECTAL ONCE
Status: COMPLETED | OUTPATIENT
Start: 2022-01-01 | End: 2022-01-01

## 2022-01-01 RX ORDER — BUPIVACAINE HYDROCHLORIDE 5 MG/ML
2 INJECTION, SOLUTION EPIDURAL; INTRACAUDAL
Status: COMPLETED | OUTPATIENT
Start: 2022-01-01 | End: 2022-01-01

## 2022-01-01 RX ORDER — FUROSEMIDE 10 MG/ML
40 INJECTION INTRAMUSCULAR; INTRAVENOUS ONCE
Status: COMPLETED | OUTPATIENT
Start: 2022-01-01 | End: 2022-01-01

## 2022-01-01 RX ADMIN — ALBUTEROL SULFATE 10 MG: 2.5 SOLUTION RESPIRATORY (INHALATION) at 09:34

## 2022-01-01 RX ADMIN — METHYLPREDNISOLONE SODIUM SUCCINATE 40 MG: 40 INJECTION, POWDER, FOR SOLUTION INTRAMUSCULAR; INTRAVENOUS at 08:48

## 2022-01-01 RX ADMIN — ESCITALOPRAM OXALATE 10 MG: 10 TABLET ORAL at 08:47

## 2022-01-01 RX ADMIN — ESCITALOPRAM OXALATE 10 MG: 10 TABLET ORAL at 09:14

## 2022-01-01 RX ADMIN — FUROSEMIDE 40 MG: 40 TABLET ORAL at 08:40

## 2022-01-01 RX ADMIN — ACETAMINOPHEN 650 MG: 325 TABLET ORAL at 14:30

## 2022-01-01 RX ADMIN — NYSTATIN: 100000 POWDER TOPICAL at 08:26

## 2022-01-01 RX ADMIN — METOPROLOL SUCCINATE 50 MG: 50 TABLET, EXTENDED RELEASE ORAL at 08:23

## 2022-01-01 RX ADMIN — APIXABAN 2.5 MG: 2.5 TABLET, FILM COATED ORAL at 17:02

## 2022-01-01 RX ADMIN — CARVEDILOL 6.25 MG: 6.25 TABLET, FILM COATED ORAL at 17:23

## 2022-01-01 RX ADMIN — AMIODARONE HYDROCHLORIDE 200 MG: 200 TABLET ORAL at 08:42

## 2022-01-01 RX ADMIN — ALBUTEROL SULFATE 10 MG: 2.5 SOLUTION RESPIRATORY (INHALATION) at 10:42

## 2022-01-01 RX ADMIN — NYSTATIN: 100000 POWDER TOPICAL at 21:46

## 2022-01-01 RX ADMIN — CYANOCOBALAMIN TAB 500 MCG 1000 MCG: 500 TAB at 05:57

## 2022-01-01 RX ADMIN — SODIUM CHLORIDE 250 ML: 0.9 INJECTION, SOLUTION INTRAVENOUS at 10:14

## 2022-01-01 RX ADMIN — DEXTROSE MONOHYDRATE 25 ML: 25 INJECTION, SOLUTION INTRAVENOUS at 09:18

## 2022-01-01 RX ADMIN — ESCITALOPRAM OXALATE 10 MG: 10 TABLET ORAL at 12:26

## 2022-01-01 RX ADMIN — LIDOCAINE HYDROCHLORIDE 2 ML: 10 INJECTION, SOLUTION INFILTRATION; PERINEURAL at 17:50

## 2022-01-01 RX ADMIN — APIXABAN 2.5 MG: 2.5 TABLET, FILM COATED ORAL at 08:23

## 2022-01-01 RX ADMIN — LEVALBUTEROL HYDROCHLORIDE 0.63 MG: 0.63 SOLUTION RESPIRATORY (INHALATION) at 10:56

## 2022-01-01 RX ADMIN — APIXABAN 2.5 MG: 2.5 TABLET, FILM COATED ORAL at 17:26

## 2022-01-01 RX ADMIN — BUPIVACAINE HYDROCHLORIDE 2 ML: 5 INJECTION, SOLUTION EPIDURAL; INTRACAUDAL at 17:50

## 2022-01-01 RX ADMIN — ISOSORBIDE MONONITRATE 30 MG: 30 TABLET, EXTENDED RELEASE ORAL at 08:23

## 2022-01-01 RX ADMIN — INSULIN HUMAN 10 UNITS: 100 INJECTION, SOLUTION PARENTERAL at 09:15

## 2022-01-01 RX ADMIN — LEVALBUTEROL HYDROCHLORIDE 0.63 MG: 0.63 SOLUTION RESPIRATORY (INHALATION) at 08:02

## 2022-01-01 RX ADMIN — NYSTATIN: 100000 POWDER TOPICAL at 09:10

## 2022-01-01 RX ADMIN — METOPROLOL SUCCINATE 50 MG: 50 TABLET, EXTENDED RELEASE ORAL at 17:26

## 2022-01-01 RX ADMIN — OSELTAMIVIR PHOSPHATE 30 MG: 30 CAPSULE ORAL at 08:23

## 2022-01-01 RX ADMIN — ISOSORBIDE MONONITRATE 30 MG: 30 TABLET, EXTENDED RELEASE ORAL at 08:48

## 2022-01-01 RX ADMIN — APIXABAN 2.5 MG: 2.5 TABLET, FILM COATED ORAL at 08:47

## 2022-01-01 RX ADMIN — APIXABAN 2.5 MG: 2.5 TABLET, FILM COATED ORAL at 08:40

## 2022-01-01 RX ADMIN — ISOSORBIDE MONONITRATE 30 MG: 30 TABLET, EXTENDED RELEASE ORAL at 12:26

## 2022-01-01 RX ADMIN — AMIODARONE HYDROCHLORIDE 200 MG: 200 TABLET ORAL at 17:25

## 2022-01-01 RX ADMIN — FUROSEMIDE 40 MG: 10 INJECTION, SOLUTION INTRAVENOUS at 14:33

## 2022-01-01 RX ADMIN — OSELTAMIVIR PHOSPHATE 30 MG: 30 CAPSULE ORAL at 10:00

## 2022-01-01 RX ADMIN — FUROSEMIDE 40 MG: 10 INJECTION, SOLUTION INTRAVENOUS at 08:04

## 2022-01-01 RX ADMIN — METHYLPREDNISOLONE SODIUM SUCCINATE 40 MG: 40 INJECTION, POWDER, FOR SOLUTION INTRAMUSCULAR; INTRAVENOUS at 11:17

## 2022-01-01 RX ADMIN — SACUBITRIL AND VALSARTAN 0.5 TABLET: 24; 26 TABLET, FILM COATED ORAL at 17:30

## 2022-01-01 RX ADMIN — NYSTATIN: 100000 POWDER TOPICAL at 17:02

## 2022-01-01 RX ADMIN — OSELTAMIVIR PHOSPHATE 30 MG: 30 CAPSULE ORAL at 09:14

## 2022-01-01 RX ADMIN — APIXABAN 2.5 MG: 2.5 TABLET, FILM COATED ORAL at 17:37

## 2022-01-01 RX ADMIN — CYANOCOBALAMIN TAB 500 MCG 1000 MCG: 500 TAB at 06:46

## 2022-01-01 RX ADMIN — FUROSEMIDE 40 MG: 10 INJECTION, SOLUTION INTRAVENOUS at 16:01

## 2022-01-01 RX ADMIN — CALCIUM GLUCONATE 1 G: 20 INJECTION, SOLUTION INTRAVENOUS at 09:05

## 2022-01-01 RX ADMIN — APIXABAN 2.5 MG: 2.5 TABLET, FILM COATED ORAL at 17:24

## 2022-01-01 RX ADMIN — IPRATROPIUM BROMIDE 1 MG: 0.5 SOLUTION RESPIRATORY (INHALATION) at 10:43

## 2022-01-01 RX ADMIN — ISOSORBIDE MONONITRATE 30 MG: 30 TABLET, EXTENDED RELEASE ORAL at 08:40

## 2022-01-01 RX ADMIN — ACETAMINOPHEN 325MG 650 MG: 325 TABLET ORAL at 22:22

## 2022-01-01 RX ADMIN — ISOSORBIDE MONONITRATE 30 MG: 30 TABLET, EXTENDED RELEASE ORAL at 09:14

## 2022-01-01 RX ADMIN — APIXABAN 2.5 MG: 2.5 TABLET, FILM COATED ORAL at 12:26

## 2022-01-01 RX ADMIN — SODIUM CHLORIDE 50 ML/HR: 0.9 INJECTION, SOLUTION INTRAVENOUS at 12:03

## 2022-01-01 RX ADMIN — ESCITALOPRAM OXALATE 10 MG: 10 TABLET ORAL at 08:40

## 2022-01-01 RX ADMIN — ESCITALOPRAM OXALATE 10 MG: 10 TABLET ORAL at 08:24

## 2022-01-01 RX ADMIN — APIXABAN 2.5 MG: 2.5 TABLET, FILM COATED ORAL at 09:14

## 2022-01-01 RX ADMIN — METOPROLOL SUCCINATE 50 MG: 50 TABLET, EXTENDED RELEASE ORAL at 08:47

## 2022-01-01 RX ADMIN — ESCITALOPRAM OXALATE 10 MG: 10 TABLET ORAL at 08:42

## 2022-01-01 RX ADMIN — NYSTATIN: 100000 POWDER TOPICAL at 09:13

## 2022-01-01 RX ADMIN — CYANOCOBALAMIN TAB 500 MCG 1000 MCG: 500 TAB at 12:26

## 2022-01-01 RX ADMIN — METOPROLOL SUCCINATE 50 MG: 50 TABLET, EXTENDED RELEASE ORAL at 08:42

## 2022-01-01 RX ADMIN — CEFTRIAXONE 2000 MG: 2 INJECTION, SOLUTION INTRAVENOUS at 08:02

## 2022-01-01 RX ADMIN — IPRATROPIUM BROMIDE 0.5 MG: 0.5 SOLUTION RESPIRATORY (INHALATION) at 07:20

## 2022-01-01 RX ADMIN — OSELTAMIVIR PHOSPHATE 30 MG: 30 CAPSULE ORAL at 08:47

## 2022-01-01 RX ADMIN — ISOSORBIDE MONONITRATE 30 MG: 30 TABLET, EXTENDED RELEASE ORAL at 08:42

## 2022-01-01 RX ADMIN — SACUBITRIL AND VALSARTAN 0.5 TABLET: 24; 26 TABLET, FILM COATED ORAL at 08:40

## 2022-01-01 RX ADMIN — AMIODARONE HYDROCHLORIDE 200 MG: 200 TABLET ORAL at 08:40

## 2022-01-01 RX ADMIN — ACETAMINOPHEN 325MG 650 MG: 325 TABLET ORAL at 14:17

## 2022-01-01 RX ADMIN — IPRATROPIUM BROMIDE 0.5 MG: 0.5 SOLUTION RESPIRATORY (INHALATION) at 08:02

## 2022-01-01 RX ADMIN — METHYLPREDNISOLONE SODIUM SUCCINATE 40 MG: 40 INJECTION, POWDER, FOR SOLUTION INTRAMUSCULAR; INTRAVENOUS at 22:05

## 2022-01-01 RX ADMIN — TRIAMCINOLONE ACETONIDE 80 MG: 40 INJECTION, SUSPENSION INTRA-ARTICULAR; INTRAMUSCULAR at 17:50

## 2022-01-01 RX ADMIN — FUROSEMIDE 20 MG: 10 INJECTION, SOLUTION INTRAMUSCULAR; INTRAVENOUS at 08:02

## 2022-01-01 RX ADMIN — IPRATROPIUM BROMIDE 0.5 MG: 0.5 SOLUTION RESPIRATORY (INHALATION) at 10:56

## 2022-01-01 RX ADMIN — APIXABAN 2.5 MG: 2.5 TABLET, FILM COATED ORAL at 08:42

## 2022-01-01 RX ADMIN — ONDANSETRON 4 MG: 2 INJECTION INTRAMUSCULAR; INTRAVENOUS at 07:31

## 2022-01-01 RX ADMIN — CYANOCOBALAMIN TAB 500 MCG 1000 MCG: 500 TAB at 05:15

## 2022-01-01 RX ADMIN — ISOSORBIDE MONONITRATE 30 MG: 30 TABLET, EXTENDED RELEASE ORAL at 17:26

## 2022-01-01 RX ADMIN — ESCITALOPRAM OXALATE 10 MG: 10 TABLET ORAL at 17:26

## 2022-01-01 RX ADMIN — APIXABAN 2.5 MG: 2.5 TABLET, FILM COATED ORAL at 17:29

## 2022-01-01 RX ADMIN — NYSTATIN: 100000 POWDER TOPICAL at 17:24

## 2022-01-01 RX ADMIN — ALBUTEROL SULFATE 5 MG: 2.5 SOLUTION RESPIRATORY (INHALATION) at 07:20

## 2022-01-01 RX ADMIN — FUROSEMIDE 40 MG: 10 INJECTION, SOLUTION INTRAVENOUS at 17:27

## 2022-01-01 RX ADMIN — SODIUM POLYSTYRENE SULFONATE 15 G: 1 POWDER ORAL; RECTAL at 09:03

## 2022-01-01 RX ADMIN — METOPROLOL SUCCINATE 50 MG: 50 TABLET, EXTENDED RELEASE ORAL at 08:40

## 2022-01-26 PROBLEM — D64.89 OTHER SPECIFIED ANEMIAS: Status: ACTIVE | Noted: 2022-01-01

## 2022-01-26 PROBLEM — F32.A ANXIETY AND DEPRESSION: Status: ACTIVE | Noted: 2022-01-01

## 2022-01-26 PROBLEM — I48.92 ATRIAL FLUTTER (HCC): Status: ACTIVE | Noted: 2022-01-01

## 2022-01-26 PROBLEM — I50.42 CHRONIC COMBINED SYSTOLIC AND DIASTOLIC CONGESTIVE HEART FAILURE (HCC): Status: ACTIVE | Noted: 2021-01-01

## 2022-01-26 PROBLEM — F41.9 ANXIETY AND DEPRESSION: Status: ACTIVE | Noted: 2022-01-01

## 2022-01-26 NOTE — ASSESSMENT & PLAN NOTE
She supposed to be on atorvastatin 40 mg daily according to her record from [facility] and fish oil but she is not taking  Discussed with the patient daughter to get it lipid panel blood test then I can advise accordingly  Advised to watch diet for cholesterol intake

## 2022-01-26 NOTE — ASSESSMENT & PLAN NOTE
Her last vitamin-D level was 28 5  Per her daughter she is now takes vitamin-D 5000 International Units daily  Will monitor vitamin-D level

## 2022-01-26 NOTE — ASSESSMENT & PLAN NOTE
She was discharged from the hospital on escitalopram 10 mg daily  Per patient's daughter she is not on escitalopram for last 2 days as she ran out of medication  She started feeling again anxious and has some panic attack and feels run down lack of interest and feels depressed per patient's daughter  She  would like to go back to [facility]   I prescribed escitalopram 10 mg daily to continue

## 2022-01-26 NOTE — PROGRESS NOTES
Cardiology Follow Up    Sweta Luu [facility]  1935  [id number]  [facility] [place]  [address]  [address]  [place] [id number] [phone]    1  Chronic combined systolic and diastolic congestive heart failure (HCC)  carvedilol (COREG) 6 25 mg tablet   2  Hypertensive heart disease with congestive heart failure, unspecified heart failure type ([address] )     3  Mixed hyperlipidemia     4  Status post implantation of automatic cardioverter/defibrillator (AICD)  Ambulatory referral to Cardiology   5  Chronic congestive heart failure, unspecified heart failure type [facility])  Ambulatory referral to Cardiology    POCT ECG   6  PAF (paroxysmal atrial fibrillation) (HCC)  apixaban (Eliquis) 2 5 mg       Discussion/Summary:    60-year-old female with chronic combined systolic and diastolic congestive Heart failure with an ejection fraction of 30%  Medtronic ICD is in place  In the office today, she is in atrial flutter  She is actually asymptomatic  Her heart rate is 116  Blood pressure is on the lower side  Currently on 20 mg of Lasix and on Aldactone, and her volume status remains stable  She had several hospitalizations in this past year, she moved here recently but was hospitalized in [facility], as well  With regards to her cardiomyopathy, I would prefer to increase the Coreg (given AF)  I will stop the losartan for now  Continue the Lasix and Aldactone  She has an ICD in place which is a Medtronic device  I will ask the device Clinic to enroll her for monitoring  She does not have a interrogator at home  She is newly in flutter today in the office  No symptoms, and heart rate a little fast  As noted above, stop losartan, increase Coreg  Stop aspirin and start Eliquis 2 5mg twice a day  She has had several exacerbations of heart failure throughout the year, but currently has remained pretty stable    Her weight is monitored at home  She has no significant lower extremity edema and her lungs are clear  Continue her current volume management  I did discuss with her daughter, who has good understanding as she works for hospice at [facility] about the patient's overall condition  Management is of symptoms, which she currently has none  The patient's daughter also asked me about my thoughts about the patient's desire to go to [facility]   It seems like if she had her way, the patient will be traveling back to [facility] the near future  I explained that there would be no invasive procedures or surgeries which would be recommended, and the focus really should be on the patient's quality of life at this point  If she would be happiest back in her home country, then this certainly would be reasonable to start arranging now, she seems quite stable  A lot of this is dependent on the degree of care, supervision and assistance she would be able to get either here or in [place]  If she is to remain in the [address] [facility] [facility], I will see her back in 2 months  History of Present Illness: This 70-year-old female comes to see me in the office today  I met her when she was admitted to the [facility]  She lived in [facility], came here pretty recently, but per the patient she is very interested in going back to [facility]     The patient's daughter is present at the office visit  She helps to interpret, and she actually works as a  and for hospice at [facility]     The patient has a history of a cardiomyopathy, unclear if it is ischemic or nonischemic  She has an ejection fraction of 30% on echocardiogram done in the hospital   She had an ICD placed a Medtronic device in [facility]   Over the last year, it seems like she has had several admissions for heart failure    Additionally, the patient's daughter tells me that she does have anxiety, and often her shortness of breath is felt to be related to this  Indeed, during the hospitalization, she was diuresed and discharged after few days  She has not seen any physician in the office since her discharge on December 21st   She has remained stable  Her weight is the same  Her blood pressure tends to be on the lower side  In the office, she is in atrial flutter with variable block, and in the ER and hospital, she was in sinus rhythm  No known history of arrhythmias, the patient denies any palpitations        Medical Problems             Problem List     Hypertension    Hypertensive heart disease with congestive heart failure (HCC)    Wt Readings from Last 3 Encounters:   01/26/22 59 3 kg (130 lb 11 2 oz)   12/21/21 57 6 kg (126 lb 15 8 oz)   12/16/21 58 5 kg (129 lb)                 Gastroesophageal reflux disease without esophagitis    Chronic combined systolic and diastolic congestive heart failure (HCC)    Wt Readings from Last 3 Encounters:   01/26/22 59 3 kg (130 lb 11 2 oz)   12/21/21 57 6 kg (126 lb 15 8 oz)   12/16/21 58 5 kg (129 lb)                 Vitamin B12 deficiency    Vitamin D deficiency    Status post implantation of automatic cardioverter/defibrillator (AICD)    Obstructive sleep apnea syndrome    BMI 27 0-27 9,adult    COPD (chronic obstructive pulmonary disease) ([facility] )    Non-insulin dependent type 2 diabetes mellitus ([facility] )      Lab Results   Component Value Date    HGBA1C 5 9 (H) 12/18/2021         Mixed hyperlipidemia    Shortness of breath              Past Medical History:   Diagnosis Date    BMI 27 0-27 9,adult 12/16/2021    CHF (congestive heart failure) (HCC)     Congestive heart failure (CHF) ([facility] ) 12/16/2021    COPD (chronic obstructive pulmonary disease) (HCC)     COPD (chronic obstructive pulmonary disease) ([facility] ) 12/16/2021    Diabetes 1 5, managed as type 2 ([address] )     Gastroesophageal reflux disease without esophagitis 12/16/2021    High blood pressure     Hypertension 12/16/2021    Hypertensive heart disease with congestive heart failure ([facility] ) 12/16/2021    Obstructive sleep apnea syndrome 12/16/2021    Status post implantation of automatic cardioverter/defibrillator (AICD) 12/16/2021    Vitamin B12 deficiency 12/16/2021    Vitamin D deficiency 12/16/2021     Social History     Tobacco Use    Smoking status: Never Smoker    Smokeless tobacco: Never Used   Vaping Use    Vaping Use: Never used   Substance Use Topics    Alcohol use: Never    Drug use: Never      Family History   Problem Relation Age of Onset    Diabetes Sister     Diabetes Brother     Heart disease Daughter     Depression Paternal Grandmother      Past Surgical History:   Procedure Laterality Date    ATRIAL CARDIAC PACEMAKER INSERTION      REPLACEMENT TOTAL KNEE         Current Outpatient Medications:     carvedilol (COREG) 6 25 mg tablet, Take 1 tablet (6 25 mg total) by mouth 2 (two) times a day with meals, Disp: 180 tablet, Rfl: 3    furosemide (LASIX) 20 mg tablet, Take 20 mg by mouth daily , Disp: , Rfl:     isosorbide mononitrate (IMDUR) 30 mg 24 hr tablet, Take 30 mg by mouth daily, Disp: , Rfl:     spironolactone (ALDACTONE) 25 mg tablet, Take 25 mg by mouth daily , Disp: , Rfl:     ALPRAZolam (XANAX) 0 25 mg tablet, Take 1 tablet (0 25 mg total) by mouth 3 (three) times a day as needed for anxiety (Patient not taking: Reported on 1/26/2022 ), Disp: 20 tablet, Rfl: 0    apixaban (Eliquis) 2 5 mg, Take 1 tablet (2 5 mg total) by mouth 2 (two) times a day, Disp: 180 tablet, Rfl: 3    escitalopram (LEXAPRO) 10 mg tablet, Take 1 tablet (10 mg total) by mouth daily (Patient not taking: Reported on 1/26/2022 ), Disp: 30 tablet, Rfl: 0    famotidine (PEPCID) 20 mg tablet, Take 20 mg by mouth daily (Patient not taking: Reported on 1/26/2022 ), Disp: , Rfl:   No Known Allergies    Vitals:    01/26/22 1250   BP: 106/68   BP Location: Left arm   Patient Position: Sitting   Cuff Size: Standard   Pulse: (!) 116   SpO2: 99% Weight: 59 3 kg (130 lb 11 2 oz)   Height: 4' 9" (1 448 m)     Vitals:    01/26/22 1250   Weight: 59 3 kg (130 lb 11 2 oz)      Height: 4' 9" (144 8 cm)   Body mass index is 28 28 kg/m²  Physical Exam:   GENERAL: elderly female  HEENT:  PERRL, EOMI, no scleral icterus, no conjunctival pallor  NECK:  Supple, No elevated JVP, no thyromegaly, no carotid bruits  HEART:  Tachycardic, irregular  No significant murmurs  LUNGS:  Clear to auscultation bilaterally  ABDOMEN:  Soft, non-tender, positive bowel sounds, no rebound or guarding  EXTREMITIES:  No edema  VASCULAR:  Normal pedal pulses   NEURO: No focal deficits,  SKIN: Normal without suspicious lesions on exposed skin      ROS:  Except as noted in HPI, is otherwise reviewed in detail and a 12 point review of systems is negative  Labs:  Lab Results   Component Value Date    SODIUM 138 12/21/2021    K 5 0 12/21/2021     12/21/2021    CREATININE 0 94 12/21/2021    BUN 19 12/21/2021    CO2 33 12/21/2021    ALT 12 12/17/2021    AST 16 12/17/2021    INR 0 97 12/17/2021    GLUF 93 12/19/2021    HGBA1C 5 9 (H) 12/18/2021    WBC 5 92 12/21/2021    HGB 10 2 (L) 12/21/2021    HCT 32 8 (L) 12/21/2021     12/21/2021       No results found for: CHOL  No results found for: LDLCALC  No results found for: HDL  No results found for: TRIG    Testing:  Echo 12/2021:  Left Ventricle: Left ventricular cavity size is mildly to moderately dilated  The left ventricular ejection fraction is 30%  Systolic function is severely reduced  There is severe global hypokinesis with regional variation  There is eccentric hypertrophy    IVS: There is abnormal septal motion consistent with right ventricular pacing    Left Atrium: The atrium is mildly to moderately dilated    Aortic Valve: The aortic valve is trileaflet  The leaflets are moderately thickened  The leaflets are moderately calcified  The leaflets exhibit normal mobility  The valve appears sclerotic     Mitral Valve: There is annular calcification  There is mild to moderate regurgitation    Tricuspid Valve: There is moderate regurgitation  The right ventricular systolic pressure is severely elevated  The estimated right ventricular systolic pressure is 00 4 mmHg    Pulmonary Artery: The pulmonary artery systolic pressure is severely increased  EKG:  Atrial flutter, variable block  Rate 116 BPM   LBBB

## 2022-01-26 NOTE — PROGRESS NOTES
Assessment/Plan:    1  Anxiety and depression  Assessment & Plan:  She was discharged from the hospital on escitalopram 10 mg daily  Per patient's daughter she is not on escitalopram for last 2 days as she ran out of medication  She started feeling again anxious and has some panic attack and feels run down lack of interest and feels depressed per patient's daughter  She  would like to go back to [facility]   I prescribed escitalopram 10 mg daily to continue  2  Hypertension, unspecified type  Assessment & Plan:  Blood pressure well controlled  Advised to continue present medication  Advised for low-salt diet  Orders:  -     Basic metabolic panel; Future    3  Chronic combined systolic and diastolic congestive heart failure [facility])  Assessment & Plan:  Wt Readings from Last 3 Encounters:   01/26/22 59 kg (130 lb)   01/26/22 59 3 kg (130 lb 11 2 oz)   12/21/21 57 6 kg (126 lb 15 8 oz)       Stable and compensated  She was seen by cardiologist today  On furosemide and spironolactone  Advised for BMP blood test   To monitor electrolytes and renal function      Orders:  -     Basic metabolic panel; Future    4  Atrial flutter, unspecified type [facility])  Assessment & Plan:  She was seen by cardiologist today started on Eliquis and carvedilol     Orders:  -     CBC and differential; Future    5  Anemia due to other cause, not classified  Assessment & Plan:  Her hemoglobin was 10 2 when she left the hospital   Denies any GI symptoms  Will follow CBC with a ferritin W31 and folic acid level  Orders:  -     CBC and differential; Future  -     Vitamin B12; Future  -     Ferritin; Future  -     Folate; Future    6  Mixed hyperlipidemia  Assessment & Plan:  She supposed to be on atorvastatin 40 mg daily according to her record from [facility] and fish oil but she is not taking  Discussed with the patient daughter to get it lipid panel blood test then I can advise accordingly    Advised to watch diet for cholesterol intake  Orders:  -     Lipid panel; Future    7  Vitamin D deficiency  Assessment & Plan:  Her last vitamin-D level was 28 5  Per her daughter she is now takes vitamin-D 5000 International Units daily  Will monitor vitamin-D level  Orders:  -     Vitamin D 25 hydroxy; Future    8  Vitamin B12 deficiency  Assessment & Plan:  Her last vitamin B12 was 157  Patient's daughter states she started taking vitamin B12 100 mcg daily but she takes only every other day as patient does not want to take daily  Will monitor vitamin B12 level  Orders:  -     Vitamin B12; Future  -     Folate; Future    9  Anxiety  -     escitalopram (LEXAPRO) 10 mg tablet; Take 1 tablet (10 mg total) by mouth daily          Subjective:  Patient here for follow-up  Patient ID: Tala Chavez is a 81 y o  female  HPI   81-year-old female patient here for follow-up her medical problems  She is here with her daughter  History obtained from her daughter as patient has a language barrier  Per her daughter she went to see cardiologist today and started on 2 new medications 1 is a Eliquis another one is  carvedilol  Since he ran out of her escitalopram for last 2 days she is feeling more anxious having some panic attack and feels run down and depression per patient's daughter  Per patient's daughter she is not feeling comfortable here in [place] and she would like to go back to to [facility]   She denies any chest pain, shortness of breath  Denies any fever or chills  Denies pain in abdomen or nausea vomiting diarrhea      The following portions of the patient's history were reviewed and updated as appropriate:     Past Medical History:  She has a past medical history of Anxiety and depression (1/26/2022), Atrial flutter ([facility] ) (1/26/2022), BMI 27 0-27 9,adult (12/16/2021), CHF (congestive heart failure) ([facility] ), Congestive heart failure (CHF) ([facility] ) (12/16/2021), COPD (chronic obstructive pulmonary disease) ([facility] ), COPD (chronic obstructive pulmonary disease) ([place] [address] ) (12/16/2021), Diabetes 1 5, managed as type 2 ([place] [address] ), Gastroesophageal reflux disease without esophagitis (12/16/2021), High blood pressure, Hypertension (12/16/2021), Hypertensive heart disease with congestive heart failure ([place] [address] ) (12/16/2021), Obstructive sleep apnea syndrome (12/16/2021), Other specified anemias (1/26/2022), Status post implantation of automatic cardioverter/defibrillator (AICD) (12/16/2021), Vitamin B12 deficiency (12/16/2021), and Vitamin D deficiency (12/16/2021)  ,  _______________________________________________________________________  Past Surgical History:   has a past surgical history that includes Replacement total knee and Atrial cardiac pacemaker insertion  ,  _______________________________________________________________________  Family History:  family history includes Depression in her paternal grandmother; Diabetes in her brother and sister; Heart disease in her daughter ,  _______________________________________________________________________  Social History:   reports that she has never smoked  She has never used smokeless tobacco  She reports that she does not drink alcohol and does not use drugs  ,  _______________________________________________________________________  Allergies:  has No Known Allergies     _______________________________________________________________________  Current Outpatient Medications   Medication Sig Dispense Refill    apixaban (Eliquis) 2 5 mg Take 1 tablet (2 5 mg total) by mouth 2 (two) times a day 180 tablet 3    carvedilol (COREG) 6 25 mg tablet Take 1 tablet (6 25 mg total) by mouth 2 (two) times a day with meals 180 tablet 3    Cholecalciferol (Vitamin D) 125 MCG (5000 UT) CAPS Take 1 tablet by mouth in the morning      furosemide (LASIX) 20 mg tablet Take 20 mg by mouth daily       isosorbide mononitrate (IMDUR) 30 mg 24 hr tablet Take 30 mg by mouth daily      spironolactone (ALDACTONE) 25 mg tablet Take 25 mg by mouth daily       vitamin B-12 (VITAMIN B-12) 1,000 mcg tablet Take 1 tablet by mouth daily in the early morning      ALPRAZolam (XANAX) 0 25 mg tablet Take 1 tablet (0 25 mg total) by mouth 3 (three) times a day as needed for anxiety (Patient not taking: Reported on 1/26/2022 ) 20 tablet 0    escitalopram (LEXAPRO) 10 mg tablet Take 1 tablet (10 mg total) by mouth daily 30 tablet 1     No current facility-administered medications for this visit      _______________________________________________________________________  Review of Systems   Constitutional: Negative for chills and fever  HENT: Negative for congestion, ear pain, hearing loss, nosebleeds, sinus pain, sore throat and trouble swallowing  Eyes: Negative for discharge, redness and visual disturbance  Respiratory: Negative for cough, chest tightness and shortness of breath  Cardiovascular: Negative for chest pain and palpitations  Gastrointestinal: Negative for abdominal pain, blood in stool, constipation, diarrhea, nausea and vomiting  Genitourinary: Negative for dysuria, flank pain, frequency and hematuria  Musculoskeletal: Negative for arthralgias, myalgias and neck pain  Skin: Negative for color change and rash  Neurological: Negative for dizziness, speech difficulty, weakness and headaches  Hematological: Does not bruise/bleed easily  Psychiatric/Behavioral: Negative for agitation, behavioral problems, self-injury and suicidal ideas  The patient is nervous/anxious  Objective:  Vitals:    01/26/22 1402   BP: 128/80   BP Location: Left arm   Patient Position: Sitting   Cuff Size: Adult   Pulse: 99   Temp: 98 3 °F (36 8 °C)   TempSrc: Tympanic   SpO2: 98%   Weight: 59 kg (130 lb)     Body mass index is 28 13 kg/m²  Physical Exam  Vitals and nursing note reviewed  Constitutional:       General: She is not in acute distress  Appearance: Normal appearance  She is normal weight     HENT: Head: Normocephalic and atraumatic  Right Ear: Ear canal and external ear normal       Left Ear: Ear canal and external ear normal       Nose:      Comments: Patient has a face mask on     Mouth/Throat:      Comments: Patient has a face mask on  Eyes:      General: No scleral icterus  Right eye: No discharge  Left eye: No discharge  Extraocular Movements: Extraocular movements intact  Conjunctiva/sclera: Conjunctivae normal    Cardiovascular:      Rate and Rhythm: Normal rate  Rhythm irregular  Pulses: Normal pulses  Heart sounds: No murmur heard  Pulmonary:      Effort: Pulmonary effort is normal  No respiratory distress  Breath sounds: Normal breath sounds  Abdominal:      General: Bowel sounds are normal       Palpations: Abdomen is soft  Tenderness: There is no abdominal tenderness  Musculoskeletal:         General: Normal range of motion  Cervical back: Normal range of motion and neck supple  Right lower leg: Edema (Has trace pedal edema) present  Left lower leg: Edema ( has trace pedal edema) present  Skin:     General: Skin is warm  Findings: No rash  Neurological:      General: No focal deficit present  Mental Status: She is alert and oriented to person, place, and time  Motor: No weakness  Coordination: Coordination normal    Psychiatric:         Mood and Affect: Mood normal          Behavior: Behavior normal          I spent 30 minutes with the patient today    More than 50% time spent for reviewing of external notes, reviewing of the results of diagnostics test, management of care, patient education and ordering of test

## 2022-01-26 NOTE — ASSESSMENT & PLAN NOTE
Her last vitamin B12 was 157  Patient's daughter states she started taking vitamin B12 100 mcg daily but she takes only every other day as patient does not want to take daily  Will monitor vitamin B12 level

## 2022-01-26 NOTE — ASSESSMENT & PLAN NOTE
Her hemoglobin was 10 2 when she left the hospital   Denies any GI symptoms  Will follow CBC with a ferritin E26 and folic acid level

## 2022-01-26 NOTE — ASSESSMENT & PLAN NOTE
Wt Readings from Last 3 Encounters:   01/26/22 59 kg (130 lb)   01/26/22 59 3 kg (130 lb 11 2 oz)   12/21/21 57 6 kg (126 lb 15 8 oz)       Stable and compensated  She was seen by cardiologist today  On furosemide and spironolactone    Advised for BMP blood test   To monitor electrolytes and renal function

## 2022-01-26 NOTE — PATIENT INSTRUCTIONS
Patient was advised to continue present medications  discussed with the patient medications and laboratory data in detail  Follow-up with me in 3 months or as advised  If any blood test was ordered please do 1 week prior to next appointment unless advise to get earlier    If you have any questions please call the office [phone]

## 2022-02-01 NOTE — PROGRESS NOTES
Pulmonary Consultation   Dusty Fernandez 81 y o  female MRN: [id number]  2/1/2022    Consulting physician:  Dr Fernando Hummel:    COPD (chronic obstructive pulmonary disease) ([facility] )  I actually can not find evidence she has this diagnosis; regardless, she has a significant smoking history and has occasional dyspnea which has been relieved with nebulizer treatments previously  Recommend use of an albuterol rescue inhaler, which the patient already has, as needed  Really no need for testing  Follow up PRN  Plan:    Diagnoses and all orders for this visit:    Chronic obstructive pulmonary disease, unspecified COPD type ([facility] )  -     Albuterol PRN    No follow-ups on file  History of Present Illness   HPI:  Dusty Fernandez is a 81 y o  female who presents for evaluation for COPD  She was admitted and treated for acute on chronic congestive heart failure recently, which was our first time meeting her in our health network  She recently immigrated to the area from [facility] at the request of her family, who were noting that she was not getting good quality healthcare there  For unclear reasons, she was given a diagnosis of COPD  This appears to stem from the fact that she was hospitalized at one point in [facility] with a pneumothorax requiring chest tube placement  The patient is a former smoker, smoked multiple packs per day for decades before quitting about 20 years ago  Her imaging has been inconsistent with emphysema  She does not have a history of recurrent exacerbations of any respiratory disease and she has been hospitalized once for in her life  At present, the patient does not have respiratory complaints  She is sometimes breathless when getting out of the shower, and has had positive response to nebulizer therapy when anxious or dyspneic  She does not have a baseline cough  She had no other acute complaints      Review of Systems   Respiratory: Negative for cough, shortness of breath and wheezing  All other systems reviewed and are negative        Historical Information   Past Medical History:   Diagnosis Date    Anxiety and depression 1/26/2022    Atrial flutter ([facility] ) 1/26/2022    BMI 27 0-27 9,adult 12/16/2021    [facility]congest[facility] failure) ([facility])     Congestive heart failure (CHF) ([place] [address] ) 12/16/2021    [facility]) [facility]     COPD (chronic obstructive pulmonary disease) ([place] [address] ) 12/16/2021    Diabetes 1 5, managed as type 2 ([place] [address] )     Gastroesophageal reflux disease without esophagitis 12/16/2021    High blood pressure     Hypertension 12/16/2021    Hypertensive heart disease with congestive heart failure ([place] [address] ) 12/16/2021    Obstructive sleep apnea syndrome 12/16/2021    Other specified anemias 1/26/2022    Status post implantation of automatic cardioverter/defibrillator (AICD) 12/16/2021    Vitamin B12 deficiency 12/16/2021    Vitamin D deficiency 12/16/2021     Past Surgical History:   Procedure Laterality Date    ATRIAL CARDIAC PACEMAKER INSERTION      REPLACEMENT TOTAL KNEE       Family History   Problem Relation Age of Onset    Diabetes Sister     Diabetes Brother     Heart disease Daughter     Depression Paternal Grandmother        Meds/Allergies     Current Outpatient Medications:     apixaban (Eliquis) 2 5 mg, Take 1 tablet (2 5 mg total) by mouth 2 (two) times a day, Disp: 180 tablet, Rfl: 3    carvedilol (COREG) 6 25 mg tablet, Take 1 tablet (6 25 mg total) by mouth 2 (two) times a day with meals, Disp: 180 tablet, Rfl: 3    Cholecalciferol (Vitamin D) 125 MCG (5000 UT) CAPS, Take 1 tablet by mouth in the morning, Disp: , Rfl:     escitalopram (LEXAPRO) 10 mg tablet, Take 1 tablet (10 mg total) by mouth daily, Disp: 30 tablet, Rfl: 1    furosemide (LASIX) 20 mg tablet, Take 20 mg by mouth daily , Disp: , Rfl:     isosorbide mononitrate (IMDUR) 30 mg 24 hr tablet, Take 30 mg by mouth daily, Disp: , Rfl:    spironolactone (ALDACTONE) 25 mg tablet, Take 25 mg by mouth daily , Disp: , Rfl:     vitamin B-12 (VITAMIN B-12) 1,000 mcg tablet, Take 1 tablet by mouth daily in the early morning, Disp: , Rfl:   No Known Allergies    Vitals: Blood pressure 102/62, pulse 100, temperature 99 1 °F (37 3 °C), height 4' 9" (1 448 m), weight 58 6 kg (129 lb 3 2 oz), SpO2 98 %  Body mass index is 27 96 kg/m²  Oxygen Therapy  SpO2: 98 %  Oxygen Therapy: None (Room air)    Physical Exam  Physical Exam  Vitals reviewed  Constitutional:       General: She is not in acute distress  Appearance: Normal appearance  She is well-developed  She is not ill-appearing  HENT:      Head: Normocephalic and atraumatic  Eyes:      General: No scleral icterus  Conjunctiva/sclera: Conjunctivae normal    Neck:      Vascular: No JVD  Cardiovascular:      Rate and Rhythm: Normal rate and regular rhythm  Heart sounds: Normal heart sounds  No murmur heard  No friction rub  No gallop  Pulmonary:      Effort: Pulmonary effort is normal  No respiratory distress  Breath sounds: Normal breath sounds  No wheezing or rales  Musculoskeletal:      Cervical back: Neck supple  Right lower leg: No edema  Left lower leg: No edema  Skin:     General: Skin is warm and dry  Findings: No rash  Neurological:      General: No focal deficit present  Mental Status: She is alert and oriented to person, place, and time  Mental status is at baseline  Psychiatric:         Mood and Affect: Mood normal          Behavior: Behavior normal      Labs: I have personally reviewed pertinent lab results    Lab Results   Component Value Date    WBC 5 92 12/21/2021    HGB 10 2 (L) 12/21/2021    HCT 32 8 (L) 12/21/2021    MCV 89 12/21/2021     12/21/2021     Lab Results   Component Value Date    CALCIUM 8 7 12/21/2021    K 5 0 12/21/2021    CO2 33 12/21/2021     12/21/2021    BUN 19 12/21/2021    CREATININE 0 94 12/21/2021 No results found for: IGE  Lab Results   Component Value Date    ALT 12 12/17/2021    AST 16 12/17/2021    ALKPHOS 59 0 12/17/2021       Imaging and other studies: I have personally reviewed pertinent films in PACS    EKG, Pathology, and Other Studies: I have personally reviewed pertinent films in PACS    Euna Goodpasture, M D [facility]

## 2022-02-01 NOTE — ASSESSMENT & PLAN NOTE
I actually can not find evidence she has this diagnosis; regardless, she has a significant smoking history and has occasional dyspnea which has been relieved with nebulizer treatments previously  Recommend use of an albuterol rescue inhaler, which the patient already has, as needed  Really no need for testing  Follow up PRN

## 2022-03-03 NOTE — TELEPHONE ENCOUNTER
I cannot order wheelchair on the phone will need a proper documentation as will need to fax progress note with proper documentation for wheelchair so will need to see her at office

## 2022-03-03 NOTE — TELEPHONE ENCOUNTER
Nicky Sorrel is really having a hard time walking even with the walker  Daughter wants to know if you will order a wheelchair - send to [facility]

## 2022-04-01 PROBLEM — R73.9 HYPERGLYCEMIA: Status: ACTIVE | Noted: 2022-01-01

## 2022-04-01 PROBLEM — R53.83 FATIGUE: Status: ACTIVE | Noted: 2022-01-01

## 2022-04-01 PROBLEM — I48.0 PAROXYSMAL ATRIAL FIBRILLATION (HCC): Status: ACTIVE | Noted: 2022-01-01

## 2022-04-01 PROBLEM — M25.562 LEFT KNEE PAIN: Status: ACTIVE | Noted: 2022-01-01

## 2022-04-01 NOTE — PATIENT INSTRUCTIONS
Patient was advised to continue present medications  discussed with the patient medications and laboratory data in detail  Follow-up with me in 3 months or as advised  If any blood test was ordered please do 1 week prior to next appointment unless advise to get earlier    If you have any questions please call the office [phone]

## 2022-04-01 NOTE — PROGRESS NOTES
Assessment/Plan:    1  Hypertension, unspecified type  Assessment & Plan:  Blood pressure well controlled  Advised to continue present medication  Advised for low-salt diet  2  Anxiety and depression  Assessment & Plan:  Patient's daughter feels that  escitalopram is helping her anxiety and depression  Advised to continue escitalopram 10 mg daily    Orders:  -     escitalopram (LEXAPRO) 10 mg tablet; Take 1 tablet (10 mg total) by mouth daily    3  Chronic obstructive pulmonary disease, unspecified COPD type ([facility] )  Assessment & Plan:  Patient has been followed by pulmonary specialist   Uses albuterol inhaler p r n  4  Chronic combined systolic and diastolic congestive heart failure (HCC)  Assessment & Plan:  Wt Readings from Last 3 Encounters:   04/01/22 60 3 kg (133 lb)   02/01/22 58 6 kg (129 lb 3 2 oz)   01/26/22 59 kg (130 lb)         Presently stable and compensated  Has been followed by cardiologist   Carlos Mcclellan to continue present medications  5  Paroxysmal atrial fibrillation [facility])  Assessment & Plan:  Patient has been followed by cardiologist   On apixaban       6  Vitamin D deficiency  Assessment & Plan:  Her vitamin-D level was 28 5  Patient has been taking vitamin-D 5000 International Units daily  Will check vitamin-D level  7  Vitamin B12 deficiency  Assessment & Plan:  Her vitamin B12 level was 157 in the past   Patient has been taking vitamin B12 1000 mcg tablet daily  Will check vitamin B12 level  8  Chronic pain of left knee  Assessment & Plan:  She has a pain in her left knee  Has a difficulty in walking  Denies any recent fall or injury  Most likely osteoarthritis versus other etiology  Patient is not taking any pain medications advised to take acetaminophen 500 mg 1 p o  b i d  p r n     Will refer to orthopedic specialist     Orders:  -     Ambulatory Referral to Orthopedic Surgery; Future    9   Other fatigue  Assessment & Plan:  Patient's daughter states she feels tired  Rule out any metabolic problem versus dehydration versus deconditioning  Patient does not walk much at home  Patient's daughter was advised have her start ambulating in house every day  Advised to go for blood test as requested on January 26, 2022       10  Hyperglycemia  Assessment & Plan:  Advised for low sugar low carbs diet  Will check fasting blood and hemoglobin A1c      11  Status post implantation of automatic cardioverter/defibrillator (AICD)  Assessment & Plan:  Patient has been followed by cardiologist       12  Mixed hyperlipidemia  Assessment & Plan:  Patient is supposed to take atorvastatin 40 mg daily  But she is not taking any cholesterol medication  As a lipid panel  Advised for low-cholesterol low saturated diet            Subjective:  Patient presents for follow-up      Patient ID: Lisa Montoya is a 81 y o  female  HPI history obtained from her daughter due to language barrier  81-year-old female patient presents follow-up her medical problems  Patient is here with her daughter  Per her daughter denies any chest pain or any shortness of breath or pain abdomen  She feels tired  Denies any cough, fever, chills  Denies any nausea vomiting, diarrhea    Complain of left knee pain and  has difficulty in ambulation due to left knee pain    The following portions of the patient's history were reviewed and updated as appropriate:     Past Medical History:  She has a past medical history of Anxiety and depression (1/26/2022), Atrial flutter ([facility] ) (1/26/2022), BMI 27 0-27 9,adult (12/16/2021), CHF (congestive heart failure) ([facility] ), Congestive heart failure (CHF) ([facility] ) (12/16/2021), [facility] obstructive [facility] disease[facility] ), COPD (chronic obstructive pulmonary disease) ([facility] ) (12/16/2021), Diabetes 1 5, managed as type 2 ([facility] ), Fatigue (4/1/2022), Gastroesophageal reflux disease without esophagitis (12/16/2021), High blood pressure, Hyperglycemia (4/1/2022), Hypertension (12/16/2021), Hypertensive heart disease with congestive heart failure ([facility] ) (12/16/2021), Left knee pain (4/1/2022), Obstructive sleep apnea syndrome (12/16/2021), Other specified anemias (1/26/2022), [facility]ox[facility] ([facility] ) (4/1/2022), Status post implantation of automatic cardioverter/defibrillator (AICD) (12/16/2021), Vitamin B12 deficiency (12/16/2021), and Vitamin D deficiency (12/16/2021)  ,  _______________________________________________________________________  Past Surgical History:   has a past surgical history that includes Replacement total knee and Atrial cardiac pacemaker insertion  ,  _______________________________________________________________________  Family History:  family history includes Depression in her paternal grandmother; Diabetes in her brother and sister; Heart disease in her daughter ,  _______________________________________________________________________  Social History:   reports that she quit smoking about 20 years ago  Her smoking use included cigarettes  She has a 100 00 pack-year smoking history  She has never used smokeless tobacco  She reports that she does not drink alcohol and does not use drugs  ,  _______________________________________________________________________  Allergies:  has No Known Allergies     _______________________________________________________________________  Current Outpatient Medications   Medication Sig Dispense Refill    albuterol (PROVENTIL HFA,VENTOLIN HFA) 90 mcg/act inhaler Inhale 2 puffs every 6 (six) hours as needed      apixaban (Eliquis) 2 5 mg Take 1 tablet (2 5 mg total) by mouth 2 (two) times a day 180 tablet 3    carvedilol (COREG) 6 25 mg tablet Take 1 tablet (6 25 mg total) by mouth 2 (two) times a day with meals 180 tablet 3    Cholecalciferol (Vitamin D) 125 MCG (5000 UT) CAPS Take 1 tablet by mouth in the morning      escitalopram (LEXAPRO) 10 mg tablet Take 1 tablet (10 mg total) by mouth daily 90 tablet 1    furosemide (LASIX) 20 mg tablet Take 20 mg by mouth daily       isosorbide mononitrate (IMDUR) 30 mg 24 hr tablet Take 30 mg by mouth daily      spironolactone (ALDACTONE) 25 mg tablet Take 25 mg by mouth daily       vitamin B-12 (VITAMIN B-12) 1,000 mcg tablet Take 1 tablet by mouth daily in the early morning       No current facility-administered medications for this visit      _______________________________________________________________________  Review of Systems   Constitutional: Positive for fatigue  Negative for chills and fever  HENT: Negative for congestion, ear pain, hearing loss, nosebleeds, sinus pain, sore throat and trouble swallowing  Eyes: Negative for discharge, redness and visual disturbance  Respiratory: Negative for cough, chest tightness and shortness of breath  Cardiovascular: Negative for chest pain and palpitations  Gastrointestinal: Negative for abdominal pain, blood in stool, constipation, diarrhea, nausea and vomiting  Genitourinary: Negative for dysuria, flank pain and hematuria  Musculoskeletal: Positive for arthralgias (Left knee pain)  Negative for myalgias and neck pain  Skin: Negative for color change and rash  Neurological: Negative for dizziness, speech difficulty, weakness and headaches  Hematological: Does not bruise/bleed easily  Psychiatric/Behavioral: Negative for agitation, behavioral problems, self-injury and suicidal ideas  Objective:  Vitals:    04/01/22 1222   BP: 128/70   BP Location: Right arm   Patient Position: Sitting   Cuff Size: Adult   Pulse: 84   Temp: 98 5 °F (36 9 °C)   TempSrc: Tympanic   SpO2: 98%   Weight: 60 3 kg (133 lb)     Body mass index is 28 78 kg/m²  Physical Exam  Vitals and nursing note reviewed  Constitutional:       General: She is not in acute distress  Appearance: Normal appearance  HENT:      Head: Normocephalic and atraumatic        Right Ear: Ear canal and external ear normal  Left Ear: Ear canal and external ear normal       Nose: Nose normal       Mouth/Throat:      Mouth: Mucous membranes are moist    Eyes:      General: No scleral icterus  Extraocular Movements: Extraocular movements intact  Conjunctiva/sclera: Conjunctivae normal    Cardiovascular:      Rate and Rhythm: Normal rate  Rhythm irregular  Pulses: Normal pulses  Heart sounds: No murmur heard  Pulmonary:      Effort: Pulmonary effort is normal  No respiratory distress  Breath sounds: Normal breath sounds  No wheezing, rhonchi or rales  Abdominal:      General: Bowel sounds are normal       Palpations: Abdomen is soft  Tenderness: There is no abdominal tenderness  Musculoskeletal:         General: Tenderness (Left knee tender  Has a difficulty in ambulation due to left knee pain ) present  Normal range of motion  Cervical back: Normal range of motion and neck supple  No muscular tenderness  Right lower leg: No edema  Left lower leg: No edema  Skin:     General: Skin is warm  Findings: No rash  Neurological:      General: No focal deficit present  Mental Status: She is alert and oriented to person, place, and time  Motor: No weakness  Psychiatric:         Mood and Affect: Mood normal          Behavior: Behavior normal          I spent 30 minutes with the patient today    More than 50% time spent for reviewing of external notes, reviewing of the results of diagnostics test, management of care, patient education and ordering of test

## 2022-04-02 NOTE — ASSESSMENT & PLAN NOTE
Patient's daughter states she feels tired  Rule out any metabolic problem versus dehydration versus deconditioning  Patient does not walk much at home  Patient's daughter was advised have her start ambulating in house every day  Advised to go for blood test as requested on January 26, 2022

## 2022-04-02 NOTE — ASSESSMENT & PLAN NOTE
Patient has been followed by pulmonary specialist   Uses albuterol inhaler p r courtney Ibrahim Serum

## 2022-04-02 NOTE — ASSESSMENT & PLAN NOTE
Her vitamin-D level was 28 5  Patient has been taking vitamin-D 5000 International Units daily  Will check vitamin-D level

## 2022-04-02 NOTE — ASSESSMENT & PLAN NOTE
Patient's daughter feels that  escitalopram is helping her anxiety and depression    Advised to continue escitalopram 10 mg daily

## 2022-04-02 NOTE — ASSESSMENT & PLAN NOTE
She has a pain in her left knee  Has a difficulty in walking  Denies any recent fall or injury  Most likely osteoarthritis versus other etiology  Patient is not taking any pain medications advised to take acetaminophen 500 mg 1 p o  b i d  p r n     Will refer to orthopedic specialist

## 2022-04-02 NOTE — ASSESSMENT & PLAN NOTE
Her vitamin B12 level was 157 in the past   Patient has been taking vitamin B12 1000 mcg tablet daily  Will check vitamin B12 level

## 2022-04-02 NOTE — ASSESSMENT & PLAN NOTE
Wt Readings from Last 3 Encounters:   04/01/22 60 3 kg (133 lb)   02/01/22 58 6 kg (129 lb 3 2 oz)   01/26/22 59 kg (130 lb)         Presently stable and compensated  Has been followed by cardiologist   Chey Matias to continue present medications

## 2022-04-02 NOTE — ASSESSMENT & PLAN NOTE
Patient is supposed to take atorvastatin 40 mg daily  But she is not taking any cholesterol medication  As a lipid panel    Advised for low-cholesterol low saturated diet

## 2022-04-05 NOTE — TELEPHONE ENCOUNTER
I called patient to schedule from referral  Patient's daughter Stephanie Green will cb later to let us know what insurance patient has & to schedule if it is something we par with

## 2022-04-13 NOTE — PROGRESS NOTES
Assessment/Plan   Diagnoses and all orders for this visit:    Chronic pain of left knee    Left knee osteoarthritis  - Cortisone injection today  - Work on gentle ROM daily  - Tylenol as needed  - Follow up with Dr Nati Vyas in 3 months          Subjective   Patient ID: Caroline Lemon is a 81 y o  female  Vitals:    04/13/22 1647   Pulse: 103   SpO2: 98%     82yo female comes in for an evaluation of her left knee  She has been having ongoing pain for many years  No specific injury  The pain is dull in character, mild in severity, pain does not radiate and is not associated with numbness  She brought her daughter with her who is a nurse and   She would not ever consider any surgery due to heart issues          The following portions of the patient's history were reviewed and updated as appropriate: allergies, current medications, past family history, past medical history, past social history, past surgical history and problem list     Review of Systems  Ortho Exam  Past Medical History:   Diagnosis Date    Anxiety and depression 1/26/2022    Atrial flutter ([facility] ) 1/26/2022    BMI 27 0-27 9,adult 12/16/2021    [facility]ive [facility]     Congest[facility] failure ([facility]F) ([facility] ) 12/16/2021    COPD (chronic obstructive pulmonary disease) ([place] [id number] )     COPD (chronic obstructive pulmonary disease) ([facility] ) 12/16/2021    Diabetes 1 5, managed as type 2 ([facility]  )     Fatigue 4/1/2022    Gastroesophageal reflux disease without esophagitis 12/16/2021    High blood pressure     Hyperglycemia 4/1/2022    Hypertension 12/16/2021    Hypertensive heart disease with congestive heart failure ([facility] ) 12/16/2021    Left knee pain 4/1/2022    Obstructive sleep apnea syndrome 12/16/2021    Other specified anemias 1/26/2022    [facility]oxysmal atrial fibrillation ([facility] ) 4/1/2022    Status post implantation of automatic cardioverter/defibrillator (AICD) 12/16/2021    Vitamin B12 deficiency 12/16/2021  Vitamin D deficiency 2021     Past Surgical History:   Procedure Laterality Date    ATRIAL CARDIAC PACEMAKER INSERTION      REPLACEMENT TOTAL KNEE       Family History   Problem Relation Age of Onset    Diabetes Sister     Diabetes Brother     Heart disease Daughter     Depression Paternal Grandmother      Social History     Occupational History    Not on file   Tobacco Use    Smoking status: Former Smoker     Packs/day: 2 00     Years: 50 00     Pack years: 100 00     Types: Cigarettes     Quit date:      Years since quittin 2    Smokeless tobacco: Never Used   Vaping Use    Vaping Use: Never used   Substance and Sexual Activity    Alcohol use: Never    Drug use: Never    Sexual activity: Not Currently       Review of Systems   Constitutional: Negative  HENT: Negative  Eyes: Negative  Respiratory: Negative  Cardiovascular: Negative  Gastrointestinal: Negative  Endocrine: Negative  Genitourinary: Negative  Musculoskeletal: As below      Allergic/Immunologic: Negative  Neurological: Negative  Hematological: Negative  Psychiatric/Behavioral: Negative  Objective   Physical Exam      · Constitutional: Awake, Alert, Oriented  · Eyes: EOMI  · Psych: Mood and affect appropriate  · Heart: regular rate   · Lungs: No audible wheezing  · Abdomen: No guarding  · Lymph: no lymphedema   left Knee:  - Appearance   No swelling, discoloration, deformity, or ecchymosis  - Effusion   none  - Palpation   + Tenderness medial joint line    No tenderness of the patella, patellar tendon, pes anserine, lateral joint line, MCL, LCL, medial / lateral plateau and + Tenderness lateral joint line   - ROM   Extension: 5 and Flexion: 100  - Special Tests   Anterior Drawer Test negative, Posterior Drawer Test negative, Valgus Stress Test negative, Varus Stress Test negative and Patellar apprehension negative  - Motor   normal 5/5 in all planes  - NVI distally    I have personally reviewed pertinent films in PACS and my interpretation is Significant oa with lateral joint space- bone on bone on xray  Large joint arthrocentesis: L knee  Universal Protocol:  Consent: Verbal consent obtained  Risks and benefits: risks, benefits and alternatives were discussed  Consent given by: patient  Time out: Immediately prior to procedure a "time out" was called to verify the correct patient, procedure, equipment, support staff and site/side marked as required  Timeout called at: 4/13/2022 5:50 PM   Patient understanding: patient states understanding of the procedure being performed  Site marked: the operative site was marked  Radiology Images displayed and confirmed   If images not available, report reviewed: imaging studies available  Patient identity confirmed: verbally with patient    Supporting Documentation  Indications: pain   Procedure Details  Location: knee - L knee  Needle size: 22 G  Ultrasound guidance: no  Approach: lateral  Medications administered: 2 mL bupivacaine (PF) 0 5 %; 2 mL lidocaine 1 %; 80 mg triamcinolone acetonide 40 mg/mL    Patient tolerance: patient tolerated the procedure well with no immediate complications  Dressing:  Sterile dressing applied

## 2022-04-20 PROBLEM — N18.32 STAGE 3B CHRONIC KIDNEY DISEASE (HCC): Status: ACTIVE | Noted: 2022-01-01

## 2022-04-20 PROBLEM — E87.5 HYPERKALEMIA: Status: ACTIVE | Noted: 2022-01-01

## 2022-04-20 PROBLEM — J11.1 INFLUENZA: Status: ACTIVE | Noted: 2022-01-01

## 2022-04-20 NOTE — QUICK NOTE
Notification was received by nursing staff due to concern for what appeared to be ventricular tachycardia, on review of telemetry  12- lead EKG was obtained, showed a ventricular paced rhythm  On physical assessment, patient appears to be in no distress, denying chest pain, shortness a breath at the time of the encounter  Cardiopulmonary exam is unremarkable  Heart rate approximately 90 beats per minute  Will continue monitoring telemetry

## 2022-04-20 NOTE — ED NOTES
Pt placed on 2L via NC for comfort  Extremely SOB and tachypneic        Kaia Milton RN  04/20/22 [id number] Marcella Calhoun RN  04/20/22 1881

## 2022-04-20 NOTE — ASSESSMENT & PLAN NOTE
Lab Results   Component Value Date    EGFR 39 04/20/2022    EGFR 36 04/10/2022    EGFR 55 12/21/2021    CREATININE 1 24 04/20/2022    CREATININE 1 33 (H) 04/10/2022    CREATININE 0 94 12/21/2021   Related we at baseline however she has elevated BUN  Continue to monitor

## 2022-04-20 NOTE — ASSESSMENT & PLAN NOTE
Patient started with symptoms of headache, diaphoresis, nausea, vomiting this morning  Also complains of some shortness of breath and cough  Nonproductive cough  Tested positive for influenza, no sick contacts  Start patient on Tamiflu  Isolation

## 2022-04-20 NOTE — ASSESSMENT & PLAN NOTE
Likely in the setting of elevated BUN and creatinine, spironolactone  Have discontinued spironolactone  Recheck BMP in the evening, if potassium continues to rise will treated

## 2022-04-20 NOTE — ED PROVIDER NOTES
History  Chief Complaint   Patient presents with    Shortness of Breath     arrived via [facility] with c/o SOB, n/v that started this am       28-year-old female previous medical history of CHF with cardioverter (23% EF), diabetes, atrial flutter on Eliquis, paroxysmal atrial fibrillation, hypertension, DEYANIRA  Patient presents with nausea, vomiting, dyspnea  Dyspnea worse when lying down  Patient was recently told that she is dehydrated  Skipped 1 dose of diuretic yesterday  Denies lower extremity edema  Patient reports approximately 5 lb heavier than her normal     Denies chest pain  Vomit was nonbloody and nonbilious  Patient also notes sore throat yesterday  Taking all medications including diuretics and blood thinner as prescribed  Patient is DNR/DNI  Not immunized against covid  Negative rapid covid test this am           Shortness of Breath  Severity:  Severe  Onset quality:  Sudden  Timing:  Constant  Progression:  Unchanged  Chronicity:  New  Relieved by:  Nothing  Worsened by:  Exertion  Ineffective treatments:  None tried  Associated symptoms: vomiting    Associated symptoms: no chest pain        Prior to Admission Medications   Prescriptions Last Dose Informant Patient Reported? Taking?    Cholecalciferol (Vitamin D) 125 MCG (5000 UT) CAPS Past Week at 0800 Child Yes Yes   Sig: Take 1 tablet by mouth in the morning   albuterol (PROVENTIL HFA,VENTOLIN HFA) 90 mcg/act inhaler Not Taking at Unknown time  Yes No   Sig: Inhale 2 puffs every 6 (six) hours as needed   Patient not taking: Reported on 4/20/2022    apixaban (Eliquis) 2 5 mg 4/19/2022 at 1700 Child No Yes   Sig: Take 1 tablet (2 5 mg total) by mouth 2 (two) times a day   carvedilol (COREG) 6 25 mg tablet 4/19/2022 at 1700 Child No Yes   Sig: Take 1 tablet (6 25 mg total) by mouth 2 (two) times a day with meals   escitalopram (LEXAPRO) 10 mg tablet 4/19/2022 at 0900  No Yes   Sig: Take 1 tablet (10 mg total) by mouth daily   furosemide (LASIX) 20 mg tablet Past Week at 0800 Child Yes Yes   Sig: Take 20 mg by mouth daily    isosorbide mononitrate (IMDUR) 30 mg 24 hr tablet 4/19/2022 at 0800 Child Yes Yes   Sig: Take 30 mg by mouth daily   spironolactone (ALDACTONE) 25 mg tablet 4/19/2022 at 0800 Child Yes Yes   Sig: Take 25 mg by mouth daily    vitamin B-12 (VITAMIN B-12) 1,000 mcg tablet Past Week at 0800 Child Yes Yes   Sig: Take 1 tablet by mouth daily in the early morning      Facility-Administered Medications: None       Past Medical History:   Diagnosis Date    Anxiety and depression 1/26/2022    Atrial flutter ([place] [address] ) 1/26/2022    BMI 27 0-27 9,adult 12/16/2021    [facility]ive [facility]     Congestive heart failure (CHF) ([place] [address] ) 12/16/2021    COPD (chronic obstructive pulmonary disease) ([place] [address] )     COPD (chronic obstructive pulmonary disease) ([place] [address] ) 12/16/2021    Diabetes 1 5, managed as type 2 ([place] [address] )     Fatigue 4/1/2022    Gastroesophageal reflux disease without esophagitis 12/16/2021    High blood pressure     Hyperglycemia 4/1/2022    Hypertension 12/16/2021    Hypertensive heart disease with congestive heart failure ([place] [address] ) 12/16/2021    Left knee pain 4/1/2022    Obstructive sleep apnea syndrome 12/16/2021    Other specified anemias 1/26/2022    Paroxysmal atrial fibrillation ([place] [address] ) 4/1/2022    Status post implantation of automatic cardioverter/defibrillator (AICD) 12/16/2021    Vitamin B12 deficiency 12/16/2021    Vitamin D deficiency 12/16/2021       Past Surgical History:   Procedure Laterality Date    ATRIAL CARDIAC PACEMAKER INSERTION      REPLACEMENT TOTAL KNEE         Family History   Problem Relation Age of Onset    Diabetes Sister     Diabetes Brother     Heart disease Daughter     Depression Paternal Grandmother      I have reviewed and agree with the history as documented      E-Cigarette/Vaping    E-Cigarette Use Never User      E-Cigarette/Vaping Substances    Nicotine No     THC No     CBD No     Flavoring No     Other No     Unknown No      Social History     Tobacco Use    Smoking status: Former Smoker     Packs/day: 2 00     Years: 50 00     Pack years: 100 00     Types: Cigarettes     Quit date:      Years since quittin 3    Smokeless tobacco: Never Used   Vaping Use    Vaping Use: Never used   Substance Use Topics    Alcohol use: Never    Drug use: Never       Review of Systems   Respiratory: Positive for shortness of breath  Cardiovascular: Negative for chest pain  Gastrointestinal: Positive for nausea and vomiting  All other systems reviewed and are negative  Physical Exam  Physical Exam  Vitals and nursing note reviewed  Constitutional:       General: She is not in acute distress  Appearance: Normal appearance  She is not ill-appearing  HENT:      Head: Normocephalic and atraumatic  Right Ear: External ear normal       Left Ear: External ear normal       Nose: Nose normal       Mouth/Throat:      Mouth: Mucous membranes are moist    Eyes:      General:         Right eye: No discharge  Left eye: No discharge  Conjunctiva/sclera: Conjunctivae normal    Cardiovascular:      Rate and Rhythm: Normal rate and regular rhythm  Pulses: Normal pulses  Heart sounds: No murmur heard  Pulmonary:      Effort: Tachypnea and accessory muscle usage present  Breath sounds: Decreased breath sounds present  Abdominal:      General: Abdomen is flat  There is no distension  Tenderness: There is no abdominal tenderness  Musculoskeletal:         General: Normal range of motion  Cervical back: Normal range of motion  Right lower leg: No edema  Left lower leg: No edema  Skin:     General: Skin is warm  Capillary Refill: Capillary refill takes less than 2 seconds  Findings: No rash  Neurological:      General: No focal deficit present  Mental Status: She is alert  Mental status is at baseline  Psychiatric:         Mood and Affect: Mood normal          Behavior: Behavior normal          Vital Signs  ED Triage Vitals   Temperature Pulse Respirations Blood Pressure SpO2   04/20/22 0657 04/20/22 0657 04/20/22 0657 04/20/22 0657 04/20/22 0657   98 2 °F (36 8 °C) (!) 108 (!) 24 149/88 92 %      Temp Source Heart Rate Source Patient Position - Orthostatic VS BP Location FiO2 (%)   04/20/22 0657 04/20/22 0724 04/20/22 0820 04/20/22 0820 --   Oral Monitor Sitting Right arm       Pain Score       04/20/22 1127       No Pain           Vitals:    04/20/22 0657 04/20/22 0724 04/20/22 0820 04/20/22 1026   BP: 149/88  103/62 112/59   Pulse: (!) 108 (!) 110 (!) 109 (!) 106   Patient Position - Orthostatic VS:   Sitting Lying         Visual Acuity      ED Medications  Medications   sodium chloride (PF) 0 9 % injection 3 mL (has no administration in time range)   acetaminophen (TYLENOL) tablet 650 mg (has no administration in time range)   ondansetron (ZOFRAN) injection 4 mg (has no administration in time range)   apixaban (ELIQUIS) tablet 2 5 mg (has no administration in time range)   carvedilol (COREG) tablet 6 25 mg (has no administration in time range)   escitalopram (LEXAPRO) tablet 10 mg (has no administration in time range)   isosorbide mononitrate (IMDUR) 24 hr tablet 30 mg (has no administration in time range)   cyanocobalamin (VITAMIN B-12) tablet 1,000 mcg (has no administration in time range)   albuterol (PROVENTIL HFA,VENTOLIN HFA) inhaler 2 puff (has no administration in time range)   oseltamivir (TAMIFLU) capsule 30 mg (has no administration in time range)   albuterol inhalation solution 5 mg (5 mg Nebulization Given 4/20/22 0720)   ipratropium (ATROVENT) 0 02 % inhalation solution 0 5 mg (0 5 mg Nebulization Given 4/20/22 0720)   ondansetron (ZOFRAN) injection 4 mg (4 mg Intravenous Given 4/20/22 0731)   cefTRIAXone (ROCEPHIN) IVPB (premix in dextrose) 2,000 mg 50 mL (0 mg Intravenous Stopped 4/20/22 4442)   furosemide (LASIX) injection 20 mg (20 mg Intravenous Given 4/20/22 0802)   oseltamivir (TAMIFLU) capsule 30 mg (30 mg Oral Given 4/20/22 1000)       Diagnostic Studies  Results Reviewed     Procedure Component Value Units Date/Time    HS Troponin I 2hr [[id number]] Collected: 04/20/22 1213    Lab Status: In process Specimen: Blood from Line, Venous Updated: 04/20/22 1219    COVID/FLU/RSV [[id number]]  (Abnormal) Collected: 04/20/22 0717    Lab Status: Final result Specimen: Nares from Nose Updated: 04/20/22 0827     SARS-CoV-2 Negative     INFLUENZA A PCR Positive     INFLUENZA B PCR Negative     RSV PCR Negative    Narrative:      FOR PEDIATRIC PATIENTS - copy/paste COVID Guidelines URL to browser: [email or web address]  [email or web address]    SARS-CoV-2 assay is a Nucleic Acid Amplification assay intended for the  qualitative detection of nucleic acid from SARS-CoV-2 in nasopharyngeal  swabs  Results are for the presumptive identification of SARS-CoV-2 RNA  Positive results are indicative of infection with SARS-CoV-2, the virus  causing COVID-19, but do not rule out bacterial infection or co-infection  with other viruses  Laboratories within the [place] and its  territories are required to report all positive results to the appropriate  public health authorities  Negative results do not preclude SARS-CoV-2  infection and should not be used as the sole basis for treatment or other  patient management decisions  Negative results must be combined with  clinical observations, patient history, and epidemiological information  This test has not been FDA cleared or approved  This test has been authorized by FDA under an Emergency Use Authorization  (EUA)   This test is only authorized for the duration of time the  declaration that circumstances exist justifying the authorization of the  emergency use of an in vitro diagnostic tests for detection of SARS-CoV-2  virus and/or diagnosis of COVID-19 infection under section 564(b)(1) of  the Act, 21 U  S C  551MPE-2(I)(5), unless the authorization is terminated  or revoked sooner  The test has been validated but independent review by FDA  and CLIA is pending  Test performed using [employer] Gene[employer]pert: This RT-PCR assay targets N2,  a region unique to SARS-CoV-2  A conserved region in the E-gene was chosen  for pan-Sarbecovirus detection which includes SARS-CoV-2  Blood culture #1 [[id number]] Collected: 04/20/22 0800    Lab Status:  In process Specimen: Blood from Arm, Right Updated: 04/20/22 0811    HS Troponin I 4hr [[id number]]     Lab Status: No result Specimen: Blood     B-Type Natriuretic Peptide(BNP) CA, GH, EA Campuses Only [[id number]]  (Abnormal) Collected: 04/20/22 0712    Lab Status: Final result Specimen: Blood from Arm, Left Updated: 04/20/22 0745      pg/mL     Procalcitonin [[id number]]  (Normal) Collected: 04/20/22 0712    Lab Status: Final result Specimen: Blood from Arm, Left Updated: 04/20/22 0744     Procalcitonin <0 05 ng/ml     HS Troponin 0hr (reflex protocol) [[id number]]  (Normal) Collected: 04/20/22 0712    Lab Status: Final result Specimen: Blood from Arm, Left Updated: 04/20/22 0742     hs TnI 0hr 20 ng/L     Basic metabolic panel [[id number]]  (Abnormal) Collected: 04/20/22 0712    Lab Status: Final result Specimen: Blood from Arm, Left Updated: 04/20/22 0737     Sodium 134 mmol/L      Potassium 5 4 mmol/L      Chloride 98 mmol/L      CO2 29 mmol/L      ANION GAP 7 mmol/L      BUN 39 mg/dL      Creatinine 1 24 mg/dL      Glucose 130 mg/dL      Calcium 9 5 mg/dL      eGFR 39 ml/min/1 73sq m     Narrative:      [place] guidelines for Chronic Kidney Disease (CKD):     Stage 1 with normal or high GFR (GFR > 90 mL/min/1 73 square meters)    Stage 2 Mild CKD (GFR = 60-89 mL/min/1 73 square meters)    Stage 3A Moderate CKD (GFR = 45-59 mL/min/1 73 square meters)    Stage 3B Moderate CKD (GFR = 30-44 mL/min/1 73 square meters)    Stage 4 Severe CKD (GFR = 15-29 mL/min/1 73 square meters)    Stage 5 End Stage CKD (GFR <15 mL/min/1 73 square meters)  Note: GFR calculation is accurate only with a steady state creatinine    Lactic acid [[id number]]  (Normal) Collected: 04/20/22 1289    Lab Status: Final result Specimen: Blood from Arm, Left Updated: 04/20/22 0737     LACTIC ACID 1 5 mmol/L     Narrative:      Result may be elevated if tourniquet was used during collection      Hepatic function panel [[id number]]  (Normal) Collected: 04/20/22 0712    Lab Status: Final result Specimen: Blood from Arm, Left Updated: 04/20/22 0737     Total Bilirubin 0 82 mg/dL      Bilirubin, Direct 0 14 mg/dL      Alkaline Phosphatase 68 U/L      AST 17 U/L      ALT 14 U/L      Total Protein 7 9 g/dL      Albumin 4 2 g/dL     Protime-INR [[id number]]  (Normal) Collected: 04/20/22 0712    Lab Status: Final result Specimen: Blood from Arm, Left Updated: 04/20/22 0728     Protime 13 8 seconds      INR 1 07    APTT [[id number]]  (Normal) Collected: 04/20/22 0712    Lab Status: Final result Specimen: Blood from Arm, Left Updated: 04/20/22 0728     PTT 26 seconds     CBC and differential [[id number]]  (Abnormal) Collected: 04/20/22 0712    Lab Status: Final result Specimen: Blood from Arm, Left Updated: 04/20/22 0718     WBC 11 01 Thousand/uL      RBC 5 94 Million/uL      Hemoglobin 15 2 g/dL      Hematocrit 49 5 %      MCV 83 fL      MCH 25 6 pg      MCHC 30 7 g/dL      RDW 14 4 %      MPV 9 9 fL      Platelets 315 Thousands/uL      nRBC 0 /100 WBCs      Neutrophils Relative 83 %      Immat GRANS % 1 %      Lymphocytes Relative 10 %      Monocytes Relative 6 %      Eosinophils Relative 0 %      Basophils Relative 0 %      Neutrophils Absolute 9 06 Thousands/µL      Immature Grans Absolute 0 11 Thousand/uL      Lymphocytes Absolute 1 11 Thousands/µL      Monocytes Absolute 0 69 Thousand/µL      Eosinophils Absolute 0 00 Thousand/µL      Basophils Absolute 0 04 Thousands/µL     Blood culture #2 [[id number]] Collected: 04/20/22 3942    Lab Status: In process Specimen: Blood from Arm, Left Updated: 04/20/22 0715    UA w Reflex to Microscopic w Reflex to Culture [[id number]]     Lab Status: No result Specimen: Urine, Clean Catch                  CT chest without contrast   Final Result by Eneida Haley MD (04/20 0908)         1  Few small stable scattered 2 mm pulmonary nodules again noted  Based on current Fleischner Society 2017 Guidelines on incidental pulmonary nodule, no routine follow-up is needed if the patient is low risk  If the patient is high risk, optional    follow-up chest CT at 12 months can be considered  2   Additional findings as noted  Workstation performed: [id number]         X-ray chest 1 view portable   Final Result by Domingo Land MD (04/20 4073)      No acute cardiopulmonary disease  Workstation performed: [id number]                    Procedures  Procedures         ED Course  ED Course as of 04/20/22 1224   Wed Apr 20, 2022   0250 Procedure Note: EKG  Date/Time: 04/20/22 7:18 AM   Interpreted by: Cleopatra Rocha  Indications / Diagnosis: N/V  ECG reviewed by me, the ED Provider: yes   The EKG demonstrates:  Rhythm:  Ventricularly paced rhythm, no concordant ST elevations or concordant ST depressions  Ventricular rate 108 beats per minute  Intervals:   Wtc 469  Axis: L axis  QRS/Blocks: wide QRS  ST Changes: No acute ST Changes, no STD/TYLOR      0744 hs TnI 0hr: 20   0803 BNP(!): 983                                             MDM  Number of Diagnoses or Management Options  CHF exacerbation ([facility] [address] ): new and requires workup  Hyperkalemia: new and requires workup  Hyponatremia: new and requires workup  Influenza A: new and requires workup  Nausea and vomiting: new and requires workup  Diagnosis management comments: Patient with dyspnea    Tachypnea into the 30s  Saturating at 91% on room air  Patient placed on 2 L nasal cannula  Now breathing in the 20s and regards respiratory rate  Patient has decreased breath sounds  Will attempt to treat COPD exacerbation  No improvement of breath sounds after DuoNeb  Much more likely to be CHF exacerbation  Patient has no rales  Chest x-ray is without pleural effusion  Patient has prominence of the vessels on the right side of the lung consistent with the last time the patient had a moderate CHF exacerbation  BNP is moderately elevated  5 lb weight gain over the last couple weeks  One dosage of Lasix messed  Not overtly fluid overloaded on exam     Also will evaluate for COVID in, influenza, pneumonia  Will obtain CT scan as patient is heart is extremely large  No idea if there is a posterior pneumonia on the left secondary to signs of heart  CT scan shows no pneumonia  No significant effusion/pulmonary edema  Patient is influenza A positive  Will admit patient for tachypnea, hyperkalemia, hyponatremia, influenza a, nausea, vomiting, CHF exacerbation         Amount and/or Complexity of Data Reviewed  Clinical lab tests: ordered and reviewed  Tests in the radiology section of CPT®: ordered and reviewed  Review and summarize past medical records: yes  Discuss the patient with other providers: yes  Independent visualization of images, tracings, or specimens: yes    Risk of Complications, Morbidity, and/or Mortality  Presenting problems: high  Diagnostic procedures: high  Management options: high        Disposition  Final diagnoses:   Hyperkalemia   Hyponatremia   CHF exacerbation (HCC)   Nausea and vomiting   Influenza A     Time reflects when diagnosis was documented in both MDM as applicable and the Disposition within this note     Time User Action Codes Description Comment    4/20/2022  8:14 AM Travis Lainez Add [E87 5] Hyperkalemia     4/20/2022  8:14 AM Corey Reyes Add [E87 1] Hyponatremia 4/20/2022  8:14 AM Lizandro Reyes Add [I50 9] [facility]     4/20/2022  8:15 AM Lizandro Reyes Add [R11 2] Nausea and vomiting     4/20/2022  8:15 AM Dawit Caldwell Modify [E87 5] Hyperkalemia     4/20/2022  8:15 AM Lizandro Gramajo Modify [I50 9] [facility]     4/20/2022  8:59 AM Dawit Caldwell Add [J10 1] Influenza A       ED Disposition     ED Disposition Condition Date/Time Comment    Admit Stable Wed Apr 20, 2022  9:46 AM Case was discussed with fernie and the patient's admission status was agreed to be Admission Status: inpatient status to the service of Dr Kiya Thomas   Follow-up Information    None         Current Discharge Medication List      CONTINUE these medications which have NOT CHANGED    Details   apixaban (Eliquis) 2 5 mg Take 1 tablet (2 5 mg total) by mouth 2 (two) times a day  Qty: 180 tablet, Refills: 3    Associated Diagnoses: PAF (paroxysmal atrial fibrillation) (HCC)      carvedilol (COREG) 6 25 mg tablet Take 1 tablet (6 25 mg total) by mouth 2 (two) times a day with meals  Qty: 180 tablet, Refills: 3    Associated Diagnoses: Chronic combined systolic and diastolic congestive heart failure (HCC)      Cholecalciferol (Vitamin D) 125 MCG (5000 UT) CAPS Take 1 tablet by mouth in the morning      escitalopram (LEXAPRO) 10 mg tablet Take 1 tablet (10 mg total) by mouth daily  Qty: 90 tablet, Refills: 1    Associated Diagnoses: Anxiety and depression      furosemide (LASIX) 20 mg tablet Take 20 mg by mouth daily       isosorbide mononitrate (IMDUR) 30 mg 24 hr tablet Take 30 mg by mouth daily      spironolactone (ALDACTONE) 25 mg tablet Take 25 mg by mouth daily       vitamin B-12 (VITAMIN B-12) 1,000 mcg tablet Take 1 tablet by mouth daily in the early morning      albuterol (PROVENTIL HFA,VENTOLIN HFA) 90 mcg/act inhaler Inhale 2 puffs every 6 (six) hours as needed             No discharge procedures on file      PDMP Review     None          ED Provider  Electronically Signed by           Nai Willoughby DO  04/20/22 122

## 2022-04-20 NOTE — ASSESSMENT & PLAN NOTE
Likely multifactorial, appears to be secondary to influenza  No pneumonia on the CT scan of the chest  Patient has some bilateral basal crackles however does not appear to be in overt heart failure  Her BUN and creatinine is elevated and also her hematocrit is elevated from her baseline which suggests that she is intravascular volume depleted  She has received Lasix 20 mg IV in the ED, I will hold off on further IV diuresis  Will consult Cardiology given her heart failure  Patient has history of COPD as well, does not taking any inhalers at home, no wheezing on auscultation  Patient has been on oxygen in [facility], she came to [place] in December and since then she has been off oxygen, as per daughter her oxygen saturations at home has been around 98%  In the emergency department her O2 sats were around 91-92%, started on supplemental oxygen for symptom relief

## 2022-04-20 NOTE — ASSESSMENT & PLAN NOTE
Wt Readings from Last 3 Encounters:   04/20/22 60 1 kg (132 lb 7 9 oz)   04/01/22 60 3 kg (133 lb)   02/01/22 58 6 kg (129 lb 3 2 oz)     She has mild elevation in BNP, bilateral basal crackles and some weight gain per daughter-which suggest CHF exacerbation however her BUN and creatinine is elevated and her hematocrit is elevated which suggest dehydration  She has received 1 dose of IV Lasix in the emergency department, will hold off on any further IV diuresis  Consult Cardiology  Continue carvedilol, Imdur    Hold spironolactone due to hyperkalemia none

## 2022-04-20 NOTE — H&P
[facility] 1935, 81 y o  female MRN: [id number]  Unit/Bed#: -01 Encounter: [id number]  Primary Care Provider: Vickie Avitia MD   Date and time admitted to hospital: 4/20/2022  6:56 AM    * Shortness of breath  Assessment & Plan  Likely multifactorial, appears to be secondary to influenza  No pneumonia on the CT scan of the chest  Patient has some bilateral basal crackles however does not appear to be in overt heart failure  Her BUN and creatinine is elevated and also her hematocrit is elevated from her baseline which suggests that she is intravascular volume depleted  She has received Lasix 20 mg IV in the ED, I will hold off on further IV diuresis  Will consult Cardiology given her heart failure  Patient has history of COPD as well, does not taking any inhalers at home, no wheezing on auscultation  Patient has been on oxygen in [facility], she came to [place] in December and since then she has been off oxygen, as per daughter her oxygen saturations at home has been around 98%  In the emergency department her O2 sats were around 91-92%, started on supplemental oxygen for symptom relief  Influenza  Assessment & Plan  Patient started with symptoms of headache, diaphoresis, nausea, vomiting this morning  Also complains of some shortness of breath and cough  Nonproductive cough  Tested positive for influenza, no sick contacts  Start patient on Tamiflu  Isolation  Chronic combined systolic and diastolic congestive heart failure (HCC)  Assessment & Plan  Wt Readings from Last 3 Encounters:   04/20/22 60 1 kg (132 lb 7 9 oz)   04/01/22 60 3 kg (133 lb)   02/01/22 58 6 kg (129 lb 3 2 oz)     She has mild elevation in BNP, bilateral basal crackles and some weight gain per daughter-which suggest CHF exacerbation however her BUN and creatinine is elevated and her hematocrit is elevated which suggest dehydration    She has received 1 dose of IV Lasix in the emergency department, will hold off on any further IV diuresis  Consult Cardiology  Continue carvedilol, Imdur  Hold spironolactone due to hyperkalemia        Stage 3b chronic kidney disease [facility])  Assessment & Plan  Lab Results   Component Value Date    EGFR 39 04/20/2022    EGFR 36 04/10/2022    EGFR 55 12/21/2021    CREATININE 1 24 04/20/2022    CREATININE 1 33 (H) 04/10/2022    CREATININE 0 94 12/21/2021   Related we at baseline however she has elevated BUN  Continue to monitor  Hyperkalemia  Assessment & Plan  Likely in the setting of elevated BUN and creatinine, spironolactone  Have discontinued spironolactone  Recheck BMP in the evening, if potassium continues to rise will treated  Paroxysmal atrial fibrillation (HCC)  Assessment & Plan  Continue carvedilol and Eliquis  Anxiety and depression  Assessment & Plan  Continue escitalopram     COPD (chronic obstructive pulmonary disease) ([facility] [address] )  Assessment & Plan  Currently not in acute exacerbation, continue Ventolin as needed  Hypertension  Assessment & Plan  Continue on home medications  VTE Prophylaxis:  Eliquis  / sequential compression device   Code Status:  DNR/DNI  POLST: There is no POLST form on file for this patient (pre-hospital)  Discussion with family:  Patient's daughter at the bedside    Anticipated Length of Stay:  Patient will be admitted on an Inpatient basis with an anticipated length of stay of  more than 2 midnights  Justification for Hospital Stay:  Shortness of breath    Total Time for Visit, including Counseling / Coordination of Care: 70 minutes  Greater than 50% of this total time spent on direct patient counseling and coordination of care  Chief Complaint:   Shortness of breath    History of Present Illness:    Gage Rajan is a 81 y o  female who presents with multiple complaints  Patient speaks Kyrgyz, patient daughter translated    Per patient's daughter patient started to feel sick this morning, with headache, generalized weakness, nausea, vomiting  No hematemesis, melena  No sick contacts  Complains of dry cough, shortness of breath started this morning  No orthopnea, PND  5 lb weight gain reported  Patient has done labs on 04/10/2022, which showed elevated BUN and creatinine and she has received a call from her PCP yesterday to hold Lasix and make it every other day  In the emergency department patient underwent workup, positive for influenza  BUN and creatinine continues to rise  Elevated BNP levels  Chest x-ray does not show any pneumonia or pleural effusion  Underwent CT chest as well  Review of Systems:    More than 10 system review of systems was performed, pertinent positive and negative findings mentioned as per history present illness        Past Medical and Surgical History:     Past Medical History:   Diagnosis Date    Anxiety and depression 1/26/2022    Atrial flutter ([place] [address] ) 1/26/2022    BMI 27 0-27 9,adult 12/16/2021    CHF (congestive heart failure) ([facility]     Congestive heart failure (CHF) ([place] [address] ) 12/16/2021    COPD (chronic obstructive pulmonary disease) ([facility])     COPD (chronic obstructive pulmonary disease) ([place] [address] ) 12/16/2021    Diabetes 1 5, managed as type 2 ([place] [address] )     Fatigue 4/1/2022    Gastroesophageal reflux disease without esophagitis 12/16/2021    High blood pressure     Hyperglycemia 4/1/2022    Hypertension 12/16/2021    Hypertensive heart disease with congestive heart failure ([place] [address] ) 12/16/2021    Left knee pain 4/1/2022    Obstructive sleep apnea syndrome 12/16/2021    Other specified anemias 1/26/2022    Paroxysmal atrial fibrillation ([place] [address] ) 4/1/2022    Status post implantation of automatic cardioverter/defibrillator (AICD) 12/16/2021    Vitamin B12 deficiency 12/16/2021    Vitamin D deficiency 12/16/2021       Past Surgical History:   Procedure Laterality Date    ATRIAL CARDIAC PACEMAKER INSERTION      REPLACEMENT TOTAL KNEE         Meds/Allergies:    Prior to Admission medications    Medication Sig Start Date End Date Taking? Authorizing Provider   apixaban (Eliquis) 2 5 mg Take 1 tablet (2 5 mg total) by mouth 2 (two) times a day 22  Yes Anjelica Armstrogn MD   carvedilol (COREG) 6 25 mg tablet Take 1 tablet (6 25 mg total) by mouth 2 (two) times a day with meals 22  Yes Anjelica Armstrong MD   Cholecalciferol (Vitamin D) 125 MCG (5000 UT) CAPS Take 1 tablet by mouth in the morning   Yes Historical Provider, MD   escitalopram (LEXAPRO) 10 mg tablet Take 1 tablet (10 mg total) by mouth daily 22 Yes Melanie Ramirez MD   furosemide (LASIX) 20 mg tablet Take 20 mg by mouth daily    Yes Historical Provider, MD   isosorbide mononitrate (IMDUR) 30 mg 24 hr tablet Take 30 mg by mouth daily   Yes Historical Provider, MD   spironolactone (ALDACTONE) 25 mg tablet Take 25 mg by mouth daily    Yes Historical Provider, MD   vitamin B-12 (VITAMIN B-12) 1,000 mcg tablet Take 1 tablet by mouth daily in the early morning   Yes Historical Provider, MD   albuterol (PROVENTIL HFA,VENTOLIN HFA) 90 mcg/act inhaler Inhale 2 puffs every 6 (six) hours as needed  Patient not taking: Reported on 2022     Historical Provider, MD     I have reviewed home medications with patient family member      Allergies: No Known Allergies    Social History:     Marital Status:      Social History     Substance and Sexual Activity   Alcohol Use Never     Social History     Tobacco Use   Smoking Status Former Smoker    Packs/day: 2 00    Years: 50 00    Pack years: 100 00    Types: Cigarettes    Quit date:     Years since quittin 3   Smokeless Tobacco Never Used     Social History     Substance and Sexual Activity   Drug Use Never       Family History:    non-contributory    Physical Exam:     Vitals:   Blood Pressure: 112/59 (22 1026)  Pulse: (!) 106 (22 1026)  Temperature: 98 7 °F (37 1 °C) (04/20/22 1026)  Temp Source: Tympanic (04/20/22 1026)  Respirations: 20 (04/20/22 1026)  Height: 4' 4" (132 1 cm) (04/20/22 1026)  Weight - Scale: 60 1 kg (132 lb 7 9 oz) (04/20/22 1026)  SpO2: 98 % (04/20/22 1026)    Physical Exam    General appearance:  Patient not in acute distress  Eyes:  Pupils equal reacting to light  ENT:  Moist oral mucous membranes  CVS:  S1-S2 heard, regular rate and rhythm, no pedal edema  Chest:  Bilateral air entry present, bilateral basal crackles  Abdomen:  Soft, nontender, bowel sounds present  CNS:  No focal neurological deficits  Genitourinary: deferred  Skin:  No acute rash   psychiatric:  No psychosis  Musculoskeletal:  No joint deformities       Additional Data:     Lab Results: I have personally reviewed pertinent reports  Results from last 7 days   Lab Units 04/20/22  0712   WBC Thousand/uL 11 01*   HEMOGLOBIN g/dL 15 2   HEMATOCRIT % 49 5*   PLATELETS Thousands/uL 246   NEUTROS PCT % 83*   LYMPHS PCT % 10*   MONOS PCT % 6   EOS PCT % 0     Results from last 7 days   Lab Units 04/20/22  0712   SODIUM mmol/L 134*   POTASSIUM mmol/L 5 4*   CHLORIDE mmol/L 98   CO2 mmol/L 29   BUN mg/dL 39*   CREATININE mg/dL 1 24   ANION GAP mmol/L 7   CALCIUM mg/dL 9 5   ALBUMIN g/dL 4 2   TOTAL BILIRUBIN mg/dL 0 82   ALK PHOS U/L 68   ALT U/L 14   AST U/L 17   GLUCOSE RANDOM mg/dL 130     Results from last 7 days   Lab Units 04/20/22  0712   INR  1 07             Results from last 7 days   Lab Units 04/20/22  0712   LACTIC ACID mmol/L 1 5   PROCALCITONIN ng/ml <0 05       Imaging: I have personally reviewed pertinent reports  CT chest without contrast   Final Result by Mik Hawkins MD (04/20 6044)         1  Few small stable scattered 2 mm pulmonary nodules again noted  Based on current Fleischner Society 2017 Guidelines on incidental pulmonary nodule, no routine follow-up is needed if the patient is low risk    If the patient is high risk, optional    follow-up chest CT at 12 months can be considered  2   Additional findings as noted  Workstation performed: [id number]         X-ray chest 1 view portable   Final Result by Salley Councilman, MD (04/20 5770)      No acute cardiopulmonary disease  Workstation performed: [id number]             EKG, Pathology, and Other Studies Reviewed on Admission:   · EKG:  EKG reviewed personally by me shows paced rhythm  Allscripts / Epic Records Reviewed: Yes     ** Please Note: This note has been constructed using a voice recognition system   **

## 2022-04-20 NOTE — ED NOTES
Purwick placed to obtain urine specimen and for comfort  Pt still SOB at rest, but states she is feeling slightly better  Not as diaphoretic and tachypneic        Braxton Jones RN  04/20/22 7067

## 2022-04-20 NOTE — PLAN OF CARE
Problem: PAIN - ADULT  Goal: Verbalizes/displays adequate comfort level or baseline comfort level  Description: Interventions:  - Encourage patient to monitor pain and request assistance  - Assess pain using appropriate pain scale  - Administer analgesics based on type and severity of pain and evaluate response  - Implement non-pharmacological measures as appropriate and evaluate response  - Consider cultural and social influences on pain and pain management  - Notify physician/advanced practitioner if interventions unsuccessful or patient reports new pain  Outcome: Progressing     Problem: INFECTION - ADULT  Goal: Absence or prevention of progression during hospitalization  Description: INTERVENTIONS:  - Assess and monitor for signs and symptoms of infection  - Monitor lab/diagnostic results  - Monitor all insertion sites, i e  indwelling lines, tubes, and drains  - Monitor endotracheal if appropriate and nasal secretions for changes in amount and color  - [place] appropriate cooling/warming therapies per order  - Administer medications as ordered  - Instruct and encourage patient and family to use good hand hygiene technique  - Identify and instruct in appropriate isolation precautions for identified infection/condition  Outcome: Progressing  Goal: Absence of fever/infection during neutropenic period  Description: INTERVENTIONS:  - Monitor WBC  Outcome: Progressing     Problem: SAFETY ADULT  Goal: Patient will remain free of falls  Description: INTERVENTIONS:  - Educate patient/family on patient safety including physical limitations  - Instruct patient to call for assistance with activity   - Consult OT/PT to assist with strengthening/mobility   - Keep Call bell within reach  - Keep bed low and locked with side rails adjusted as appropriate  - Keep care items and personal belongings within reach  - Initiate and maintain comfort rounds  - Make Fall Risk Sign visible to staff  - Offer Toileting every 2 Hours, in advance of need  - Initiate/Maintain bed alarm  - Obtain necessary fall risk management equipment: walker  - Apply yellow socks and bracelet for high fall risk patients  - Consider moving patient to room near nurses station  Outcome: Progressing  Goal: Maintain or return to baseline ADL function  Description: INTERVENTIONS:  -  Assess patient's ability to carry out ADLs; assess patient's baseline for ADL function and identify physical deficits which impact ability to perform ADLs (bathing, care of mouth/teeth, toileting, grooming, dressing, etc )  - Assess/evaluate cause of self-care deficits   - Assess range of motion  - Assess patient's mobility; develop plan if impaired  - Assess patient's need for assistive devices and provide as appropriate  - Encourage maximum independence but intervene and supervise when necessary  - Involve family in performance of ADLs  - Assess for home care needs following discharge   - Consider OT consult to assist with ADL evaluation and planning for discharge  - Provide patient education as appropriate  Outcome: Progressing  Goal: Maintains/Returns to pre admission functional level  Description: INTERVENTIONS:  - Perform BMAT or MOVE assessment daily    - Set and communicate daily mobility goal to care team and patient/family/caregiver  - Collaborate with rehabilitation services on mobility goals if consulted  - Perform Range of Motion 3 times a day    - Out of bed to chair 3 times a day   - Out of bed for meals 3 times a day  - Out of bed for toileting  - Record patient progress and toleration of activity level   Outcome: Progressing     Problem: DISCHARGE PLANNING  Goal: Discharge to home or other facility with appropriate resources  Description: INTERVENTIONS:  - Identify barriers to discharge w/patient and caregiver  - Arrange for needed discharge resources and transportation as appropriate  - Identify discharge learning needs (meds, wound care, etc )  - Arrange for interpretive services to assist at discharge as needed  - Refer to Case Management Department for coordinating discharge planning if the patient needs post-hospital services based on physician/advanced practitioner order or complex needs related to functional status, cognitive ability, or social support system  Outcome: Progressing     Problem: Knowledge Deficit  Goal: Patient/family/caregiver demonstrates understanding of disease process, treatment plan, medications, and discharge instructions  Description: Complete learning assessment and assess knowledge base    Interventions:  - Provide teaching at level of understanding  - Provide teaching via preferred learning methods  Outcome: Progressing

## 2022-04-21 NOTE — OCCUPATIONAL THERAPY NOTE
Occupational Therapy Evaluation      [facility]    4/21/2022    Patient Active Problem List   Diagnosis    Hypertension    Hypertensive heart disease with congestive heart failure (HCC)    Gastroesophageal reflux disease without esophagitis    Chronic combined systolic and diastolic congestive heart failure (HCC)    Vitamin B12 deficiency    Vitamin D deficiency    Status post implantation of automatic cardioverter/defibrillator (AICD)    Obstructive sleep apnea syndrome    BMI 27 0-27 9,adult    COPD (chronic obstructive pulmonary disease) ([facility]    Non-insulin dependent type 2 diabetes mellitus (HCC)    Mixed hyperlipidemia    Shortness of breath    Anxiety and depression    Atrial flutter ([address] )    Other specified anemias    Paroxysmal atrial fibrillation (HCC)    Left knee pain    Fatigue    Hyperglycemia    Influenza    Hyperkalemia    Stage 3b chronic kidney disease ([address] )       Past Medical History:   Diagnosis Date    Anxiety and depression 1/26/2022    Atrial flutter ([address] ) 1/26/2022    BMI 27 0-27 9,adult 12/16/2021    CHF (congestive heart failure) (HCC)     Congestive heart failure (CHF) ([address] ) 12/16/2021    COPD (chronic obstructive pulmonary disease) (HCC)     COPD (chronic obstructive pulmonary disease) ([address] ) 12/16/2021    Diabetes 1 5, managed as type 2 ([address] )     Fatigue 4/1/2022    Gastroesophageal reflux disease without esophagitis 12/16/2021    High blood pressure     Hyperglycemia 4/1/2022    Hypertension 12/16/2021    Hypertensive heart disease with congestive heart failure ([address] ) 12/16/2021    Left knee pain 4/1/2022    Obstructive sleep apnea syndrome 12/16/2021    Other specified anemias 1/26/2022    Paroxysmal atrial fibrillation ([address] ) 4/1/2022    Status post implantation of automatic cardioverter/defibrillator (AICD) 12/16/2021    Vitamin B12 deficiency 12/16/2021    Vitamin D deficiency 12/16/2021       Past Surgical History:   Procedure Laterality Date    ATRIAL CARDIAC PACEMAKER INSERTION      REPLACEMENT TOTAL KNEE          04/21/22 0941   OT Last Visit   OT Visit Date 04/21/22   Note Type   Note type Evaluation   Additional Comments Daughter present throughout session for translation    Restrictions/Precautions   Weight Bearing Precautions Per Order No   Other Precautions Bed Alarm; Chair Alarm;O2;Fall Risk;Telemetry; Contact, Droplet  ([place] speaking , 1L O2 via NC, influenza+ )   Pain Assessment   Pain Assessment Tool 0-10   Pain Score No Pain   Home Living   Type of Home Apartment   Home Layout One level;Performs ADLs on one level; Able to live on main level with bedroom/bathroom;Stairs to enter with rails  ([address] (3rd floor apartment) )   Bathroom Shower/Tub Tub/shower unit   [address]   (no DME at baseline )   [address] Walker;Cane  (not used at baseline )   Additional Comments OF NOTE, pt plans to D/C to different daughters house d/t home layout temporarily (~3 weeks)  Will be in 1 story apartment with elevator access, handicapped accessible bathroom with grab bars, but no DME  Prior Function   Level of [place] Needs assistance with IADLs; Needs assistance with ADLs and functional mobility   Lives With Daughter   Receives Help From Family   ADL Assistance Needs assistance  (supervision with showering, dressing)   IADLs Needs assistance  (A with med mgmt, transportation, laundry, meals, home mgmt )   Falls in the last 6 months 1 to 4  (1 per daughter )   Vocational Retired   Comments Pt's daughter reports prepping meals for pt , can complete simple meal prep  At baseline, completes short distance mobility, limited d/t SOB  Lifestyle   Autonomy PTA, pt reports needing assistance with ADL/IADLs, lives with daughter in 1 story apartment with [address]   No AD at baseline (-) driving    Reciprocal Relationships 2 daughters, nephew    Service to Others retired government worker in [address]  [address] [facility] 5  [address]  [address] [place] [address]  5  [address]  5  [address]  5  [address]  5  Supervision/Setup   Toileting Deficit Grab bar use;Supervison/safety  ((I) theresa care and clothing management )   Functional Assistance 5  Supervision/Setup   Additional Comments pt limited by decreased endurance/SOB/LANDAVERDE, generalized weakness  Bed Mobility   Supine to Sit 6  Modified independent   Additional items HOB elevated   Sit to Supine   (DNT: pt seated OOB in recliner at end of session)   Transfers   Sit to Stand 5  Supervision   Additional items Assist x 1; Increased time required;Verbal cues   Stand to Sit 5  Supervision   Additional items Assist x 1; Increased time required   Toilet transfer 5  Supervision   Additional items Assist x 1;Standard toilet;Verbal cues  (L grab bar )   Additional Comments VC for proper hand placements during transfers  Functional Mobility   Functional Mobility 5  Supervision   Additional Comments functional mobility bed>bathroom>recliner, no LOB   Assistance for line management, self steadying on IV pole     Additional items   (no AD )   Balance   Static Sitting Good   Dynamic Sitting Fair +   Static Standing Fair +   Dynamic Standing Fair +   Activity Tolerance   Activity Tolerance Patient limited by fatigue   Medical Staff Made Aware RT, PT, CM, SLIM    Nurse Made Aware RN Nancy and Leanna Izaguirre made aware of outcomes, verbalized pt appropriate to see    RUE Assessment   RUE Assessment WFL  (MMT~ 4/5 based on functional assessment)   LUE Assessment   LUE Assessment WFL  (MMT~ 4/5 based on functional assessment)   Vision-Basic Assessment   Current Vision Does not wear glasses  (has glasses, does not wear )   Vision - Complex Assessment   Acuity Able to read normal print without difficulty   Cognition   Overall Cognitive Status WFL   Arousal/Participation Responsive   Attention Within functional limits   Orientation Level Oriented X4   Memory Within functional limits   Following Commands Follows one step commands with increased time or repetition   Comments Functional cognition assessment limited d/t language barrier, all communication via  throughout session  Attention [facility]   Assessment   Limitation Decreased ADL status; Decreased Safe judgement during ADL;Decreased endurance;Decreased self-care trans;Decreased high-level ADLs; Decreased UE strength   Prognosis Good   Assessment Patient is a 81 y o  female seen for OT evaluation at [address] [place] [address] following admission on 4/20/2022  s/p Shortness of breath  Comorbidities impacting functional performance include: HTN, COPD, CHF, anxiety and depression, A-FIB, influenza, hyperkalemia, CKD, DEYANIRA, atrial flutter, L knee OA, fatigue  Patient presents with active orders for OT eval and treat and Up with assistance   Performed at least 2 patient identifiers during session including name and wristband  PTA, pt reports needing assistance with ADL/IADLs, lives with daughter in 1 story apartment with 16STE  No AD at baseline (-) driving Upon initial evaluation, patient requires supervision/set upfor UB ADLs, supervision for LB ADLs, and supervision for transfers and functional mobility within room and bathroom with the use of with no AD  Based on functional eval, patient presents A&Ox4 with intact  attention and intact short term and long term memory   Occupational performance is affected by the following deficits: endurance ,  decreased muscular strength , decreased standing tolerance for self care tasks , decreased dynamic balance impacting functional reach and impaired safety awareness  Based on these findings, functional performance deficits, and assessment findings, pt has been identified as a high complexity evaluation  Personal factors impacting performance at the time of evaluation include: decreased (+) Hx of falls , steps to enter home, decreased initation and engagement, decreased ADL independence  and decreased IADL independence  Patient would benefit from OT services within the acute care setting to maximize level of functional independence and safety in self-care and transfers  Occupational areas to address include:bathing/showering, toileting, dressing , bed mobility , functional mobility, transfer to all surfaces, fall prevention  and energy conservation   From OT standpoint, recommendation at time of D/C would be return to previous environment with no rehabilitation needs  Goals   Patient Goals Daughter expressed goals re: endurance and strength     Plan   Treatment Interventions ADL retraining;Functional transfer training; Endurance training;UE strengthening/ROM; Patient/family training;Equipment evaluation/education; Energy conservation   Goal Expiration Date 05/01/22   OT Treatment Day 0   OT Frequency 2-3x/wk   Recommendation   OT Discharge Recommendation No rehabilitation needs   Equipment Recommended Shower/Tub chair with back ($)   OT - OK to Discharge Yes  (once medically clear)   Additional Comments  Pt resting in recliner at end of session with all needs met, call button in reach    Additional Comments 2 The patient's raw score on the AM-PAC Daily Activity inpatient short form is 21, standardized score is 44 27, greater than 39 4  Patients at this level are likely to benefit from discharge to home  Please refer to the recommendation of the Occupational Therapist for safe discharge planning     AM-PAC Daily Activity Inpatient   Lower Body Dressing 3   Bathing 3   Toileting 3   Upper Body Dressing 4   Grooming 4   Eating 4   Daily Activity Raw Score 21   Daily Activity Standardized Score (Calc for Raw Score >=11) 44 27   AM-[facility] Applied Cognition Inpatient   Following a Speech/Presentation 3 Understanding Ordinary Conversation 3   Taking Medications 3   Remembering Where Things Are Placed or Put Away 3   Remembering List of 4-5 Errands 3   Taking Care of Complicated Tasks 3   Applied Cognition Raw Score 18   Applied Cognition Standardized Score 38 07   Pt will complete UB ADLs Independent  with use of AE as needed for increased ADL independence within 10 days  Pt will complete LB ADLs Independent   with use of AE as needed for increased ADL independence within 10 days  Pt will complete toileting Mod independent   with use of DME for increased ADL independence within 10 days  Pt will demonstrate proper body mechanics to complete transfers and functional mobility with Mod independent  and use of AD for increased safety and functional mobility within 10 days  Pt will demonstrate standing tolerance of 5 min with Mod independent  and use of AD for increased endurance and activity tolerance within 10 days  Pt will demonstrate OOB sitting tolerance of 2-4 hr/day for increased activity tolerance and engagement within 10 days  Pt will participate in 10 min of BUE therapeutic exercise to increase strength, ROM, and endurance during ADL/IADLs within 10 days  Pt will demonstrate proper body mechanics and fall prevention strategies during 100% of tx sessions for increased safety awareness during ADL/IADLs    Pt will verbalize and demonstrate understanding of energy conservation/deep breathing techniques for increased activity tolerance and endurance during meaningful activities       Suly Bonilla, OT

## 2022-04-21 NOTE — UTILIZATION REVIEW
Initial Clinical Review    Admission: Date/Time/Statement:   Admission Orders (From admission, onward)     Ordered        04/20/22 0947  Inpatient Admission  Once                      Orders Placed This Encounter   Procedures    Inpatient Admission     Standing Status:   Standing     Number of Occurrences:   1     Order Specific Question:   Level of Care     Answer:   Med Surg [16]     Order Specific Question:   Estimated length of stay     Answer:   More than 2 Midnights     Order Specific Question:   Certification     Answer:   I certify that inpatient services are medically necessary for this patient for a duration of greater than two midnights  See H&P and MD Progress Notes for additional information about the patient's course of treatment  ED Arrival Information     Expected Arrival Acuity    - 4/20/2022 06:54 Urgent         Means of arrival Escorted by Service Admission type    Ambulance [facility] EMS Hospitalist Urgent         Arrival complaint            Chief Complaint   Patient presents with    Shortness of Breath     arrived via [facility] with c/o SOB, n/v that started this am       Initial Presentation: 81 y o  female from home presented to the ED via EMS with nausea, vomiting, dry cough and dyspnea that started this am  PMH of CHF with cardioverter (23% EF), diabetes, atrial flutter on Eliquis, paroxysmal atrial fibrillation, hypertension, DEYANIRA  Pt reports dysphagia is worse when lying down  Reports 5 lb weight gain  Reports PCP called her yesterday to hold Lasix and change it to every other day day d/t labs 4/10 showed elevated BUN and creatinine  In the ED, on exam, bilateral basal crackles noted  Labs showed + Influenza, elevated BNP and potassium  BUN, creatinine and hematocrit were elevated which suggest dehydration  Received IV Lasix 20 mg  Hold further IV Diuretics    Plan: Inpatient admission for evaluation and treatment of SOB, Influenza, hyperkalemia: Cardiology consulted, Continue carvedilol, Imdur  Hold spironolactone due to hyperkalemia  Recheck BMP in the evening  Start Tamiflu  Date: 04/21   Day 2:   Cardiology Consult: Chronic systolic and diastolic heart failure: pt clinically appears dehydrated  Dyspnea on minimal exertion could be secondary to underlying cardiomyopathy or deconditioning  She has underlying influenza illness  Holding off on diuretics for now, gentle hydration  Telemetry shows sinus tachycardia with intermittent ventricular pacing  For device interrogation  Cont telemetry monitoring  Nephrology Consult: Hyperkalemia, elevated creatinine: admission potassium was 5 4 and has trended up further to 6 0 on 04/21  Baseline creatinine 0 9-1 1, 1 24 on admission up to 1 4 04/21  hyperkalemia likely due to combination of volume depletion plus being on spironolactone at home and from dietary indiscretion  Cont to hold spironolactone  250 IVF bolus then NS @ 50 ml/hr for the next 20 hrs, check CK level, receiced Kayexalate  medical treatment with calcium gluconate, albuterol inhalation, insulin  repeat potassium level at 1:00 p m  Stuart Driscoll check bladder scan  Monitor renal function  IM Notes: Pt still has dry cough  Reports improvement with sob however still with sob with minimal exertion  Given her poor prognosis cardiology recommended palliative care  Palliative consulted  Cont supplemental O2  Tamiflu, Imdur, carvedilol, IVF       ED Triage Vitals   Temperature Pulse Respirations Blood Pressure SpO2   04/20/22 0657 04/20/22 0657 04/20/22 0657 04/20/22 0657 04/20/22 0657   98 2 °F (36 8 °C) (!) 108 (!) 24 149/88 92 %      Temp Source Heart Rate Source Patient Position - Orthostatic VS BP Location FiO2 (%)   04/20/22 0657 04/20/22 0724 04/20/22 0820 04/20/22 0820 --   Oral Monitor Sitting Right arm       Pain Score       04/20/22 1127       No Pain          Wt Readings from Last 1 Encounters:   04/20/22 60 1 kg (132 lb 7 9 oz)     Additional Vital Signs:   Date/Time Temp Pulse Resp BP MAP (mmHg) SpO2 Calculated FIO2 (%) - Nasal Cannula Nasal Cannula O2 Flow Rate (L/min) O2 Device Patient Position - Orthostatic VS   04/21/22 1109 98 7 °F (37 1 °C) 105 16 113/74 87 96 % 24 1 L/min Nasal cannula Lying   04/21/22 0937 -- -- -- -- -- 98 % 24 1 L/min Nasal cannula --   04/21/22 0855 -- -- -- -- -- -- 22 0 5 L/min Nasal cannula --   04/21/22 0741 97 4 °F (36 3 °C) Abnormal  108 Abnormal  18 100/59 -- 96 % 24 1 L/min Nasal cannula --   04/21/22 0352 98 2 °F (36 8 °C) 94 17 114/67 84 99 % 28 2 L/min Nasal cannula Lying   04/21/22 0153 98 7 °F (37 1 °C) -- -- -- -- -- -- -- -- --   04/20/22 2150 98 1 °F (36 7 °C) 97 17 103/63 73 99 % 28 2 L/min Nasal cannula Lying   04/20/22 1919 97 6 °F (36 4 °C) 108 Abnormal  16 99/65 -- 100 % -- -- -- Lying   04/20/22 1834 97 7 °F (36 5 °C) 101 16 82/46 Abnormal  66 98 % 28 2 L/min Nasal cannula Lying   04/20/22 1603 98 8 °F (37 1 °C) 103 17 94/54 -- 100 % -- -- -- Lying   04/20/22 1127 -- -- -- -- -- -- 28 2 L/min Nasal cannula --   04/20/22 1026 98 7 °F (37 1 °C) 106 Abnormal  20 112/59 -- 98 % -- -- -- Lying   04/20/22 0820 -- 109 Abnormal  33 Abnormal  103/62 -- 100 % 28 2 L/min Nasal cannula Sitting   04/20/22 0726 -- -- -- -- -- -- -- -- Nasal cannula --   04/20/22 0724 -- 110 Abnormal  35 Abnormal  -- -- 92 % -- -- None (Room air) --     Pertinent Labs/Diagnostic Test Results:   CT chest without contrast   Final Result by Irineo Laureano MD (04/20 1391)         1  Few small stable scattered 2 mm pulmonary nodules again noted  Based on current Fleischner Society 2017 Guidelines on incidental pulmonary nodule, no routine follow-up is needed if the patient is low risk  If the patient is high risk, optional    follow-up chest CT at 12 months can be considered  2   Additional findings as noted                    Workstation performed: [id number]         X-ray chest 1 view portable   Final Result by Shelly Rouse MD (04/20 1394)      No acute cardiopulmonary disease                    Workstation performed: [id number]           Results from last 7 days   Lab Units 04/20/22  0717   SARS-COV-2  Negative     Results from last 7 days   Lab Units 04/21/22  0510 04/20/22  0712   WBC Thousand/uL 4 43 11 01*   HEMOGLOBIN g/dL 13 0 15 2   HEMATOCRIT % 44 4 49 5*   PLATELETS Thousands/uL 186 246   NEUTROS ABS Thousands/µL 2 90 9 06*         Results from last 7 days   Lab Units 04/21/22  0510 04/20/22  0712   SODIUM mmol/L 136 134*   POTASSIUM mmol/L 6 0* 5 4*   CHLORIDE mmol/L 101 98   CO2 mmol/L 27 29   ANION GAP mmol/L 8 7   BUN mg/dL 44* 39*   CREATININE mg/dL 1 41* 1 24   EGFR ml/min/1 73sq m 33 39   CALCIUM mg/dL 8 8 9 5     Results from last 7 days   Lab Units 04/20/22  0712   AST U/L 17   ALT U/L 14   ALK PHOS U/L 68   TOTAL PROTEIN g/dL 7 9   ALBUMIN g/dL 4 2   TOTAL BILIRUBIN mg/dL 0 82   BILIRUBIN DIRECT mg/dL 0 14         Results from last 7 days   Lab Units 04/21/22  0510 04/20/22  0712   GLUCOSE RANDOM mg/dL 77 130       Results from last 7 days   Lab Units 04/21/22  0510   CK TOTAL U/L 26     Results from last 7 days   Lab Units 04/20/22  1438 04/20/22  1213 04/20/22  0712   HS TNI 0HR ng/L  --   --  20   HS TNI 2HR ng/L  --  29  --    HSTNI D2 ng/L  --  9  --    HS TNI 4HR ng/L 27  --   --    HSTNI D4 ng/L 7  --   --          Results from last 7 days   Lab Units 04/20/22  0712   PROTIME seconds 13 8   INR  1 07   PTT seconds 26         Results from last 7 days   Lab Units 04/20/22  0712   PROCALCITONIN ng/ml <0 05     Results from last 7 days   Lab Units 04/20/22  0712   LACTIC ACID mmol/L 1 5             Results from last 7 days   Lab Units 04/20/22  0712   BNP pg/mL 983*       Results from last 7 days   Lab Units 04/20/22  1740   CLARITY UA  Clear   COLOR UA  Yellow   SPEC GRAV UA  1 025   PH UA  5 5   GLUCOSE UA mg/dl Negative   KETONES UA mg/dl Negative   BLOOD UA  2+*   PROTEIN UA mg/dl Negative   NITRITE UA  Negative   BILIRUBIN UA  Negative   UROBILINOGEN UA E U /dl 0 2   LEUKOCYTES UA  Negative   WBC UA /hpf 0-1   RBC UA /hpf 4-10*   BACTERIA UA /hpf Occasional   EPITHELIAL CELLS WET PREP /hpf Occasional     Results from last 7 days   Lab Units 04/20/22  0717   INFLUENZA A PCR  Positive*   INFLUENZA B PCR  Negative   RSV PCR  Negative     Results from last 7 days   Lab Units 04/20/22  0800 04/20/22  6159   BLOOD CULTURE  Received in Microbiology Lab  Culture in Progress  Received in Microbiology Lab  Culture in Progress                 ED Treatment:   Medication Administration from 04/20/2022 0653 to 04/20/2022 1015       Date/Time Order Dose Route Action     04/20/2022 0720 albuterol inhalation solution 5 mg 5 mg Nebulization Given     04/20/2022 0720 ipratropium (ATROVENT) 0 02 % inhalation solution 0 5 mg 0 5 mg Nebulization Given     04/20/2022 0731 ondansetron (ZOFRAN) injection 4 mg 4 mg Intravenous Given     04/20/2022 0844 cefTRIAXone (ROCEPHIN) IVPB (premix in dextrose) 2,000 mg 50 mL 0 mg Intravenous Stopped     04/20/2022 0802 cefTRIAXone (ROCEPHIN) IVPB (premix in dextrose) 2,000 mg 50 mL 2,000 mg Intravenous New Bag     04/20/2022 0802 furosemide (LASIX) injection 20 mg 20 mg Intravenous Given     04/20/2022 1000 oseltamivir (TAMIFLU) capsule 30 mg 30 mg Oral Given        Past Medical History:   Diagnosis Date    Anxiety and depression 1/26/2022    Atrial flutter ([facility] ) 1/26/2022    BMI 27 0-27 9,adult 12/16/2021    CH[facility]congest[facility] heart failure) ([facility])     Congestive heart failure (CHF) ([facility] ) 12/16/2021    COPD (chronic obstructive pulmonary disease) ([place] [address] )     COPD (chronic obstructive pulmonary disease) ([place] [address] ) 12/16/2021    Diabetes 1 5, managed as type 2 ([place] [address] )     Fatigue 4/1/2022    Gastroesophageal reflux disease without esophagitis 12/16/2021    High blood pressure     Hyperglycemia 4/1/2022    Hypertension 12/16/2021    Hypertensive heart disease with congestive heart failure ([place] [address] ) 12/16/2021    Left knee pain 4/1/2022    Obstructive sleep apnea syndrome 12/16/2021    Other specified anemias 1/26/2022    Paroxysmal atrial fibrillation ([facility] ) 4/1/2022    Status post implantation of automatic cardioverter/defibrillator (AICD) 12/16/2021    Vitamin B12 deficiency 12/16/2021    Vitamin D deficiency 12/16/2021     Present on Admission:   Hypertension   Chronic combined systolic and diastolic congestive heart failure (HCC)   COPD [facility] disease) ([facility])   Shortness of breath   Paroxysmal atrial fibrillation (HCC)   Anxiety and depression   Influenza   Hyperkalemia   Stage 3b chronic kidney disease ([facility] )      Admitting Diagnosis: Shortness of breath [R06 02]  Hyperkalemia [E87 5]  Hyponatremia [E87 1]  Nausea and vomiting [R11 2]  Influenza A [J10 1]  CHF exacerbation (HCC) [I50 9]  Age/Sex: 81 y o  female  Admission Orders:  SCD  50 Hr telemetry monitoring  PT/OT  Cardiovascular diet; Potassium 2 gm  Bladder scan    Scheduled Medications:  apixaban, 2 5 mg, Oral, BID  carvedilol, 6 25 mg, Oral, BID With Meals  vitamin B-12, 1,000 mcg, Oral, Early Morning  escitalopram, 10 mg, Oral, Daily  isosorbide mononitrate, 30 mg, Oral, Daily  nystatin, , Topical, BID  oseltamivir, 30 mg, Oral, Q24H  sodium polystyrene (KAYEXALATE) powder 15 g po once  sodium chloride 0 9 % bolus 250 mL IV once    Continuous IV Infusions:  sodium chloride, 50 mL/hr, Intravenous, Continuous      PRN Meds:  acetaminophen, 650 mg, Oral, Q6H PRN 04/20 x 1  albuterol, 2 puff, Inhalation, Q6H PRN  ondansetron, 4 mg, Intravenous, Q6H PRN  sodium chloride (PF), 3 mL, Intravenous, Q1H PRN        IP CONSULT TO CARDIOLOGY  IP CONSULT TO NEPHROLOGY  IP CONSULT TO PALLIATIVE CARE    Network Utilization Review Department  ATTENTION: Please call with any questions or concerns to [phone] and carefully listen to the prompts so that you are directed to the right person   All voicemails are confidential   Kishore Vega all requests for admission clinical reviews, approved or denied determinations and any other requests to dedicated fax number below belonging to the campus where the patient is receiving treatment   List of dedicated fax numbers for the Facilities:  [address] DENIALS (Administrative/Medical Necessity) [phone]   [address] (Maternity/NICU/Pediatrics) [phone] [address] [place] [address]  [address] Medical [place] [address] [address] [place] [address] [address] [facility] [place] [address] [phone]

## 2022-04-21 NOTE — CONSULTS
Consultation - [facility] [place]  81 y o   female  /-01   MRN: [id number]  Encounter: [id number]    ASSESSMENT:    Patient Active Problem List   Diagnosis    Hypertension    Hypertensive heart disease with congestive heart failure (HCC)    Gastroesophageal reflux disease without esophagitis    Chronic combined systolic and diastolic congestive heart failure (HCC)    Vitamin B12 deficiency    Vitamin D deficiency    Status post implantation of automatic cardioverter/defibrillator (AICD)    Obstructive sleep apnea syndrome    BMI 27 0-27 9,adult    COPD (chronic obstructive pulmonary disease) (HCC)    Non-insulin dependent type 2 diabetes mellitus (HCC)    Mixed hyperlipidemia    Shortness of breath    Anxiety and depression    Atrial flutter (HCC)    Other specified anemias    Paroxysmal atrial fibrillation (HCC)    Left knee pain    Fatigue    Hyperglycemia    Influenza    Hyperkalemia    Stage 3b chronic kidney disease ([facility] [address] )     Active issues specifically addressed today include: goals of care, chronic combined systolic + diastolic CHF, DEYANIRA, shortness of breath, anxiety + depression, fatigue, CKD3b    86F w/ chronic combined systolic + diastolic CHF, COPD, DEYANIRA, shortness of breath admitted for influenza + KITTY  [facility] consulted for goals of care  PLAN:    1  Goals:    As per daughter Albert Lopez, they are not ready to discuss comfort care and hospice at this time  Agrees to consider this conversation in coming days if patient does not improve from the flu  She requested this consult to discuss ways of decreasing future hospitalizations and also have someone explain disease progression to her mother once feeling better   Palliative will follow for ongoing goals of care discussions as situation evolves  Albert Lopez agrees to a phone call Monday, If her mother is no better we will schedule a family meeting    Otherwise, Albert Lopez feels this conversation will be better had in a relaxed, outpatient appointment as her mother does not like to think about her death  2  Social Support:   Supportive listening provided with Camelia Tierney Normalized experience of patient/family   Provided anxiety containment - worried her mother will not tolerate this challenging conversation    3  Symptom management:   Per primary team       Code status: Level 3 - DNAR and DNI   Decisional apparatus: If such capacity is lost, patient's substitute decision maker would default to 2 adult daughters [place]  There are no POA documents or named HCA  Advance Directive / Living Will / POLST:  Nothing on file  I have reviewed the patient's controlled substance dispensing history in the Prescription Drug Monitoring Program in compliance with the [facility] regulations before prescribing any controlled substances  No opioid or benzo refills in [place] over past year  We appreciate the opportunity to participate in this patient's care  We will continue to follow  Please do not hesitate to contact our on-call provider through our clinic answering service at [phone] should you have acute symptom control concerns  IDENTIFICATION:  Inpatient consult to Palliative Care  Consult performed by: YARELI Perez  Consult ordered by: Daphnie Saba MD      Reason for Consult / Principal Problem: goals of care - "patient has history of congestive heart failure with EF of 25%, recurrent admissions for heart failure, poor quality of life due to shortness of breath all the time"    HISTORY OF PRESENT ILLNESS:    Bethel Duong is a Welsh-speaking 81 y o  female with chronic combined systolic + diastolic CHF (EF 27%), DEYANIRA, anxiety + depression, HTN, CKD3b, pAfib / Aflutter on Eliquis, cardiomyopathy s/p AICD, h/o nicotine dependence s/t cigarettes   Per 2/1/22 Pulmonology note she was given a diagnosis of COPD in the past but it is unsure if that diagnosis is accurate, but given that she smoked multiple packs per day for decades before quitting 20 years ago, they provided a rescue inhaler  She has had several CHF exacerbations in the past year, and was most recently hospitalized at [facility] 12/17-21/22  She returned to [facility] 4/20/22 in the AM c/o dyspnea + nausea + NBNB vomiting + sore throat; dyspnea worse when lying down  No CP  She tested positive and was admitted for influenza + KITTY  Per Cardiology notes the patient did not seem to be in CHF exacerbation, and given KITTY they recommended gentle hydration  4/21/22 Cardiology note states:  "Her daughter is requesting further guidance regarding pursuing comfort measures and we discussed palliative care consultation to discuss goals of care in light of her recurrent hospitalizations for heart failure  She does not want any invasive procedures but once her mother to be comfortable and avoid hospitalization if possible for minor problems "  "Patient speaks predominantly [place]  History was obtained from chart review and  the patient's daughter  Her daughter works as a  for hospice at [facility] [employer]  There was a talk of her going back to [place] at her last visit  She however did not go back "    Spoke to patient's daughter Guy Roman  Prior to admission patient lives with Guy Roman in her home  She is able to complete most of her ADLs and ambulates around the home with a walker  She had no chronic O2 needs and this is only her 2 admission in 5 months  She eats well, with no weight loss noted by family  She has 2 adult daughters and may be moving in with the other 1 soon as Guy Roman has multiple flights of steps that patient must be able to go up  Both daughters support Tennille Izquierdo  in making decisions    Rere requested a consult to have someone help talk about disease progression and prognosis with her mother but reports "[place] people do not like to talk about their death, I am the only person in my family willing to discuss this and I am not sure if I can do it with my mom "  She fears her mother will "not tolerate" this conversation  Poornima Rivero has worked for hospice and she does not believe her mom is ready for hospice at this time  She is very hopeful that her mother will recover from the flu and these conversations could be had at a time when her mom is more comfortable and receptive to the information  Right now Poornima Rivero believes if we have this conversation regarding her prognosis she will lose her enthusiasm to try to overcome the flu  We discussed the differences between inpatient and outpatient palliative medicine  Poornima Rivero understands that if her mom does not improve over the weekend we can certainly have these conversations inpatient but she does states, if she improves she would prefer to do this on an outpatient basis in a relaxed safe space  Will assist with arranging an outpatient appointment should we get to that point  Palliative will follow up with Rere on Monday to determine appropriateness of [address] conversation      Review of Systems   No ROS as consult was completed virtually    Past Medical History:   Diagnosis Date    Anxiety and depression 1/26/2022    Atrial flutter ([facility] [address] ) 1/26/2022    BMI 27 0-27 9,adult 12/16/2021    CHF [facility]congestive heart failure) ([facility])     Congestive heart failure (CHF) ([facility] [address] ) 12/16/2021    COPD (chronic obstructive pulmonary disease) ([facility])     COPD (chronic obstructive pulmonary disease) ([facility] [address] ) 12/16/2021    Diabetes 1 5, managed as type 2 ([facility] [address] )     Fatigue 4/1/2022    Gastroesophageal reflux disease without esophagitis 12/16/2021    High blood pressure     Hyperglycemia 4/1/2022    Hypertension 12/16/2021    Hypertensive heart disease with congestive heart failure ([facility] [address] ) 12/16/2021    Left knee pain 4/1/2022    Obstructive sleep apnea syndrome 12/16/2021    Other specified anemias 1/26/2022    Paroxysmal atrial fibrillation ([address] ) 4/1/2022    Status post implantation of automatic cardioverter/defibrillator (AICD) 2021    Vitamin B12 deficiency 2021    Vitamin D deficiency 2021     Past Surgical History:   Procedure Laterality Date    ATRIAL CARDIAC PACEMAKER INSERTION      REPLACEMENT TOTAL KNEE       Social History     Socioeconomic History    Marital status:      Spouse name: Not on file    Number of children: Not on file    Years of education: Not on file    Highest education level: Not on file   Occupational History    Not on file   Tobacco Use    Smoking status: Former Smoker     Packs/day: 2 00     Years: 50 00     Pack years: 100 00     Types: Cigarettes     Quit date:      Years since quittin 3    Smokeless tobacco: Never Used   Vaping Use    Vaping Use: Never used   Substance and Sexual Activity    Alcohol use: Never    Drug use: Never    Sexual activity: Not Currently   Other Topics Concern    Not on file   Social History Narrative    Not on file     Social Determinants of Health     Financial Resource Strain: Not on file   Food Insecurity: No Food Insecurity    Worried About Running Out of Food in the Last Year: Never true    [address] in the Last Year: Never true   Transportation Needs: No Transportation Needs    Lack of Transportation (Medical): No    Lack of Transportation (Non-Medical):  No   Physical Activity: Not on file   Stress: Not on file   Social Connections: Not on file   Intimate Partner Violence: Not on file   Housing Stability: Low Risk     Unable to Pay for Housing in the Last Year: No    Number of Places Lived in the Last Year: 1    Unstable Housing in the Last Year: No     Family History   Problem Relation Age of Onset    Diabetes Sister     Diabetes Brother     Heart disease Daughter     Depression Paternal Grandmother        MEDICATIONS / ALLERGIES:  all current active meds have been reviewed and current meds:   Current Facility-Administered Medications   Medication Dose Route Frequency    acetaminophen (TYLENOL) tablet 650 mg  650 mg Oral Q6H PRN    albuterol (PROVENTIL HFA,VENTOLIN HFA) inhaler 2 puff  2 puff Inhalation Q6H PRN    apixaban (ELIQUIS) tablet 2 5 mg  2 5 mg Oral BID    carvedilol (COREG) tablet 6 25 mg  6 25 mg Oral BID With Meals    cyanocobalamin (VITAMIN B-12) tablet 1,000 mcg  1,000 mcg Oral Early Morning    escitalopram (LEXAPRO) tablet 10 mg  10 mg Oral Daily    isosorbide mononitrate (IMDUR) 24 hr tablet 30 mg  30 mg Oral Daily    nystatin (MYCOSTATIN) powder   Topical BID    ondansetron (ZOFRAN) injection 4 mg  4 mg Intravenous Q6H PRN    oseltamivir (TAMIFLU) capsule 30 mg  30 mg Oral Q24H    sodium chloride (PF) 0 9 % injection 3 mL  3 mL Intravenous Q1H PRN    sodium chloride 0 9 % infusion  50 mL/hr Intravenous Continuous       No Known Allergies    OBJECTIVE:  /74 (BP Location: Right arm)   Pulse 105   Temp 98 7 °F (37 1 °C) (Oral)   Resp 16   Ht 4' 4" (1 321 m)   Wt 60 1 kg (132 lb 7 9 oz)   SpO2 96%   BMI 34 45 kg/m²   Nursing notes reviewed  Physical Exam  No PE as consult was completed virtually  Lab Results: I have personally reviewed pertinent labs  CBC:  Lab Results   Component Value Date    WBC 4 43 04/21/2022    HGB 13 0 04/21/2022    HCT 44 4 04/21/2022    MCV 87 04/21/2022     04/21/2022    MCH 25 4 (L) 04/21/2022    MCHC 29 3 (L) 04/21/2022    RDW 14 3 04/21/2022    MPV 10 3 04/21/2022    NRBC 0 04/21/2022     CMP:  Lab Results   Component Value Date    SODIUM 136 04/21/2022    K 6 0 (H) 04/21/2022     04/21/2022    CO2 27 04/21/2022    BUN 44 (H) 04/21/2022    CREATININE 1 41 (H) 04/21/2022    CALCIUM 8 8 04/21/2022    EGFR 33 04/21/2022     Albumin:  0   Lab Value Date/Time    ALB 4 2 04/20/2022 6025     Imaging Studies: I have personally reviewed pertinent reports  4/20/22 CT chest: "1  Few small stable scattered 2 mm pulmonary nodules again noted   Based on current Fleischner Society 2017 Guidelines on incidental pulmonary nodule, no routine follow-up is needed if the patient is low risk  If the patient is high risk, optional follow-up chest CT at 12 months can be considered "    EKG, Pathology, and Other Studies: I have personally reviewed pertinent reports  Counseling / Coordination of Care: Total floor / unit time spent today 45 minutes  Greater than 50% of total time was spent with the patient and / or family counseling and / or coordination of care  A description of the counseling / coordination of care: psychosocial support, chart review, imaging review, lab review, goals of care, hospice services and supportive listening  Jessica Hoskins, MSN, CRNP, [facility] Palliative [facility] Supportive [facility]      Portions of this document may have been created using dictation software and as such some "sound alike" terms may have been generated by the system  Do not hesitate to contact me with any questions or clarifications

## 2022-04-21 NOTE — PLAN OF CARE
Problem: OCCUPATIONAL THERAPY ADULT  Goal: Performs self-care activities at highest level of function for planned discharge setting  See evaluation for individualized goals  Description: Treatment Interventions: ADL retraining,Functional transfer training,Endurance training,UE strengthening/ROM,Patient/family training,Equipment evaluation/education,Energy conservation  Equipment Recommended: Shower/Tub chair with back ($)       See flowsheet documentation for full assessment, interventions and recommendations  Note: Limitation: Decreased ADL status,Decreased Safe judgement during ADL,Decreased endurance,Decreased self-care trans,Decreased high-level ADLs,Decreased UE strength  Prognosis: Good  Assessment: Patient is a 81 y o  female seen for OT evaluation at [address] [place] [address] following admission on 4/20/2022  s/p Shortness of breath  Comorbidities impacting functional performance include: HTN, COPD, CHF, anxiety and depression, A-FIB, influenza, hyperkalemia, CKD, DEYANIRA, atrial flutter, L knee OA, fatigue  Patient presents with active orders for OT eval and treat and Up with assistance   Performed at least 2 patient identifiers during session including name and wristband  PTA, pt reports needing assistance with ADL/IADLs, lives with daughter in 1 story apartment with 16STE  No AD at baseline (-) driving Upon initial evaluation, patient requires supervision/set upfor UB ADLs, supervision for LB ADLs, and supervision for transfers and functional mobility within room and bathroom with the use of with no AD  Based on functional eval, patient presents A&Ox4 with intact  attention and intact short term and long term memory   Occupational performance is affected by the following deficits: endurance ,  decreased muscular strength , decreased standing tolerance for self care tasks , decreased dynamic balance impacting functional reach and impaired safety awareness  Based on these findings, functional performance deficits, and assessment findings, pt has been identified as a high complexity evaluation  Personal factors impacting performance at the time of evaluation include: decreased (+) Hx of falls , steps to enter home, decreased initation and engagement, decreased ADL independence  and decreased IADL independence  Patient would benefit from OT services within the acute care setting to maximize level of functional independence and safety in self-care and transfers  Occupational areas to address include:bathing/showering, toileting, dressing , bed mobility , functional mobility, transfer to all surfaces, fall prevention  and energy conservation   From OT standpoint, recommendation at time of D/C would be return to previous environment with no rehabilitation needs        OT Discharge Recommendation: No rehabilitation needs  OT - OK to Discharge: Yes (once medically clear)     Marcela Hadley OT

## 2022-04-21 NOTE — UTILIZATION REVIEW
Inpatient Admission Authorization Request   NOTIFICATION OF INPATIENT ADMISSION/INPATIENT AUTHORIZATION REQUEST   SERVICING FACILITY:   [place] [address]   [address] [place] [address], [id number] [place] [address]  Tax ID: [id number]  NPI: [id number]  Place of Service: Inpatient [address]  Place of Service Code: 24     ATTENDING PROVIDER:  Attending Name and NPI#: Kaiden Chavez Md [[id number]]  Address: [address] [place] [address], [address] [place] [address]  Phone:  [phone]     UTILIZATION REVIEW CONTACT:  Praful Victor Utilization Review Supervisor  Network Utilization Review Department  Phone: [phone]  Fax: [phone]  Email: [email or web address]     PHYSICIAN ADVISORY SERVICES:  FOR FDPU-FX-WEGY REVIEW - MEDICAL NECESSITY DENIAL  Phone: [phone]  Fax: [phone]  Email: [email or web address]     TYPE OF REQUEST:  Inpatient Status     ADMISSION INFORMATION:  ADMISSION DATE/TIME: 4/20/22  9:47 AM  PATIENT DIAGNOSIS CODE/DESCRIPTION:  Shortness of breath [R06 02]  Hyperkalemia [E87 5]  Hyponatremia [E87 1]  Nausea and vomiting [R11 2]  Influenza A [J10 1]  CHF exacerbation ([facility] [address] ) [I50 9]  DISCHARGE DATE/TIME: No discharge date for patient encounter  IMPORTANT INFORMATION:  Please contact the Jessica Pool directly with any questions or concerns regarding this request  Department voicemails are confidential     Send requests for admission clinical reviews, concurrent reviews, approvals, and administrative denials due to lack of clinical to fax [phone]

## 2022-04-21 NOTE — ASSESSMENT & PLAN NOTE
Give calcium gluconate, insulin, dextrose, Kayexalate    Appreciate Nephrology input renal diet IV fluids

## 2022-04-21 NOTE — CASE MANAGEMENT
Case Management Assessment & Discharge Planning Note    Patient name Magalie Lopez  Location /-39 MRN [id number]  : 1935 Date 2022       Current Admission Date: 2022  Current Admission Diagnosis:Shortness of breath   Patient Active Problem List    Diagnosis Date Noted    Influenza 2022    Hyperkalemia 2022    Stage 3b chronic kidney disease ([facility] ) 2022    [facility] fibrillation [facility] ) 2022    Left knee pain 2022    Fatigue 2022    Hyperglycemia 2022    Anxiety and depression 2022    Atrial flutter ([facility] ) 2022    Other specified anemias 2022    Shortness of breath 2021    Hypertension 2021    Hypertensive heart disease with congestive heart failure ([facility] ) 2021    Gastroesophageal reflux disease without esophagitis 2021    Chronic combined systolic and diastolic congestive heart failure ([facility] ) 2021    Vitamin B12 deficiency 2021    Vitamin D deficiency 2021    Status post implantation of automatic cardioverter/defibrillator (AICD) 2021    Obstructive sleep apnea syndrome 2021    BMI 27 0-27 9,adult 2021    COPD (chronic obstructive pulmonary disease) ([facility] ) 2021    Non-insulin dependent type 2 diabetes mellitus ([facility] ) 2021    Mixed hyperlipidemia 2021      LOS (days): 1  Geometric Mean LOS (GMLOS) (days): 2 30  Days to GMLOS:1 2     OBJECTIVE:    Risk of Unplanned Readmission Score: 15         Current admission status: Inpatient       Preferred Pharmacy:   [facility] [address],[facility] [address] [place] [address] [address]  Phone: [phone] Fax: [facility] [facility] [place] [address]  Phone: [phone] Fax: [phone]    Primary Care Provider: Nette Druan MD    Primary Insurance: [employer] REPLACEMENT  Secondary Insurance:     ASSESSMENT:  Active Health Care Proxies     Omayra Polanco Representative - Daughter   Primary Phone: [phone] [facility])  Home Phone: [phone]  Work Phone: [phone]            Readmission Root Cause  30 Day Readmission: No    Patient Information  Admitted from[de-identified] Home  Mental Status: Alert  During Assessment patient was accompanied by: Not accompanied during assessment  Assessment information provided by[de-identified] Daughter Angelita Lowry)  Primary Caregiver: Family  Support Systems: [address] of Residence: [address] [facility] do you live in?: [place] entry access options   Select all that apply : Stairs  Number of steps to enter home : [address] (3rd floor apartment, no elevator access)  Do the steps have railings?: Yes  Type of Current Residence: Apartment  Floor Level: 3  Upon entering residence, is there a bedroom on the main floor (no further steps)?: Yes  Upon entering residence, is there a bathroom on the main floor (no further steps)?: Yes  In the last 12 months, was there a time when you were not able to pay the mortgage or rent on time?: No  In the last 12 months, how many places have you lived?: 1  In the last 12 months, was there a time when you did not have a steady place to sleep or slept in a shelter (including now)?: No  Homeless/housing insecurity resource given?: No  Living Arrangements: Lives w/ Daughter  Is patient a ?: No    Activities of Daily Living Prior to Admission  Functional Status: Independent  Completes ADLs independently?: Yes  Ambulates independently?: Yes  Does patient use assisted devices?: No  Does patient currently own DME?: Yes  What DME does the patient currently own?: Liss Nickerson  Does patient have a history of Outpatient Therapy (PT/OT)?: No  Does the patient have a history of Short-Term Rehab?: No  Does patient have a history of HHC?: No (Daughter reports that [place] never began services at discharge and that she would prefer referrals made to alterantive agencies )  Does patient currently have [address]?: No    Patient Information Continued  Income Source: SSI/SSD  Does patient have prescription coverage?: Yes  Within the past 12 months, you worried that your food would run out before you got the money to buy more : Never true  Within the past 12 months, the food you bought just didnt last and you didnt have money to get more : Never true  Food insecurity resource given?: No  Does patient receive dialysis treatments?: No  Does patient have a history of substance abuse?: No  Does patient have a history of Mental Health Diagnosis?: No    PHQ 2/9 Screening   Reviewed PHQ 2/9 Depression Screening Score?: No    Means of Transportation  Means of Transport to Appts[de-identified] Family transport  In the past 12 months, has lack of transportation kept you from medical appointments or from getting medications?: No  In the past 12 months, has lack of transportation kept you from meetings, work, or from getting things needed for daily living?: No  Was application for public transport provided?: No    DISCHARGE DETAILS:    Discharge planning discussed with[de-identified] Patient and daughter  Freedom of Choice: Yes  Comments - Freedom of Choice: Choice is to D/C home with [address] services  CM contacted family/caregiver?: Yes  Were Treatment Team discharge recommendations reviewed with patient/caregiver?: Yes  Did patient/caregiver verbalize understanding of patient care needs?: Yes  Were patient/caregiver advised of the risks associated with not following Treatment Team discharge recommendations?: Yes    Contacts  Patient Contacts: Celestine Polanco  Relationship to Patient[de-identified] Family  Contact Method:  In Person  Reason/Outcome: Continuity of [facility]         Is the patient interested in [address] at discharge?: Yes  [address] requested[de-identified] Shazia Pace [address] [facility] Agency Name[de-identified] Other  [address]  Provider[de-identified] PCP  Home Health Services Needed[de-identified] COPD Management,Evaluate Functional Status and Safety,Gait/ADL Training,Heart Failure Management,Strengthening/Theraputic Exercises to Improve Function  Homebound Criteria Met[de-identified] Requires the Assistance of Another Person for Safe Ambulation or to Leave the Home,Uses an Assist Device (i e  cane, walker, etc)  Supporting Clincal Findings[de-identified] Fatigues Easliy in Short Distances,Limited Endurance    DME Referral Provided  Referral made for DME?: No    Other Referral/Resources/Interventions Provided:  Interventions: [facility]  Referral Comments: Fransico Dawson referrals pended with [phone] [facility] listed as discharging address  Daughter reports that patient will be going to stay with other family at that location and would need VNA services there  Daughter reports that is a 7th floor apartment, however has a ramp to enter the buliding with elevator access      Would you like to participate in our [address] service program?  : No - Declined    Treatment Team Recommendation: Home with [address] [employer]  Discharge Destination Plan[de-identified] Home with [address] [facility] (REFERRALS PENDING)  Transport at Discharge : Family    Additional Comments: CM discussed goals of care with primary provider based on scores of Goals of Care systems list

## 2022-04-21 NOTE — ASSESSMENT & PLAN NOTE
Multifactorial likely congestive heart failure, influenza  Reports improvement in symptoms however still short of breath on exertion  Discussed with Cardiology, Nephrology  Given her poor prognosis cardiology recommended palliative care  Patient daughter is agreeable for this    Will consult palliative team   Continue supplemental oxygen as needed

## 2022-04-21 NOTE — PLAN OF CARE
Problem: INFECTION - ADULT  Goal: Absence or prevention of progression during hospitalization  Description: INTERVENTIONS:  - Assess and monitor for signs and symptoms of infection  - Monitor lab/diagnostic results  - Monitor all insertion sites, i e  indwelling lines, tubes, and drains  - Monitor endotracheal if appropriate and nasal secretions for changes in amount and color  - [place] appropriate cooling/warming therapies per order  - Administer medications as ordered  - Instruct and encourage patient and family to use good hand hygiene technique  - Identify and instruct in appropriate isolation precautions for identified infection/condition  Outcome: Not Progressing  Goal: Absence of fever/infection during neutropenic period  Description: INTERVENTIONS:  - Monitor WBC    Outcome: Not Progressing     Problem: SAFETY ADULT  Goal: Patient will remain free of falls  Description: INTERVENTIONS:  - Educate patient/family on patient safety including physical limitations  - Instruct patient to call for assistance with activity   - Consult OT/PT to assist with strengthening/mobility   - Keep Call bell within reach  - Keep bed low and locked with side rails adjusted as appropriate  - Keep care items and personal belongings within reach  - Initiate and maintain comfort rounds  - Make Fall Risk Sign visible to staff  - Offer Toileting every 2 Hours, in advance of need  - Initiate/Maintain  bed alarm  - Obtain necessary fall risk management equipment: n/a  - Apply yellow socks and bracelet for high fall risk patients  - Consider moving patient to room near nurses station  Outcome: Not Progressing  Goal: Maintain or return to baseline ADL function  Description: INTERVENTIONS:  -  Assess patient's ability to carry out ADLs; assess patient's baseline for ADL function and identify physical deficits which impact ability to perform ADLs (bathing, care of mouth/teeth, toileting, grooming, dressing, etc )  - Assess/evaluate cause of self-care deficits   - Assess range of motion  - Assess patient's mobility; develop plan if impaired  - Assess patient's need for assistive devices and provide as appropriate  - Encourage maximum independence but intervene and supervise when necessary  - Involve family in performance of ADLs  - Assess for home care needs following discharge   - Consider OT consult to assist with ADL evaluation and planning for discharge  - Provide patient education as appropriate  Outcome: Not Progressing  Goal: Maintains/Returns to pre admission functional level  Description: INTERVENTIONS:  - Perform BMAT or MOVE assessment daily    - Set and communicate daily mobility goal to care team and patient/family/caregiver  - Collaborate with rehabilitation services on mobility goals if consulted  - Perform Range of Motion 2 times a day  - Reposition patient every self  hours    - Dangle patient prn times a day  - Stand patient prn times a day  - Ambulate patient prn times a day  - Out of bed to chair prn times a day   - Out of bed for meals prn times a day  - Out of bed for toileting  - Record patient progress and toleration of activity level   Outcome: Not Progressing     Problem: DISCHARGE PLANNING  Goal: Discharge to home or other facility with appropriate resources  Description: INTERVENTIONS:  - Identify barriers to discharge w/patient and caregiver  - Arrange for needed discharge resources and transportation as appropriate  - Identify discharge learning needs (meds, wound care, etc )  - Arrange for interpretive services to assist at discharge as needed  - Refer to Case Management Department for coordinating discharge planning if the patient needs post-hospital services based on physician/advanced practitioner order or complex needs related to functional status, cognitive ability, or social support system  Outcome: Not Progressing     Problem: Knowledge Deficit  Goal: Patient/family/caregiver demonstrates understanding of disease process, treatment plan, medications, and discharge instructions  Description: Complete learning assessment and assess knowledge base    Interventions:  - Provide teaching at level of understanding  - Provide teaching via preferred learning methods  Outcome: Not Progressing     Problem: Potential for Falls  Goal: Patient will remain free of falls  Description: INTERVENTIONS:  - Educate patient/family on patient safety including physical limitations  - Instruct patient to call for assistance with activity   - Consult OT/PT to assist with strengthening/mobility   - Keep Call bell within reach  - Keep bed low and locked with side rails adjusted as appropriate  - Keep care items and personal belongings within reach  - Initiate and maintain comfort rounds  - Make Fall Risk Sign visible to staff  - Offer Toileting every 2 Hours, in advance of need  - Initiate/Maintain  bed alarm  - Obtain necessary fall risk management equipment:prn  - Apply yellow socks and bracelet for high fall risk patients  - Consider moving patient to room near nurses station  Outcome: Not Progressing     Problem: MOBILITY - ADULT  Goal: Maintain or return to baseline ADL function  Description: INTERVENTIONS:  -  Assess patient's ability to carry out ADLs; assess patient's baseline for ADL function and identify physical deficits which impact ability to perform ADLs (bathing, care of mouth/teeth, toileting, grooming, dressing, etc )  - Assess/evaluate cause of self-care deficits   - Assess range of motion  - Assess patient's mobility; develop plan if impaired  - Assess patient's need for assistive devices and provide as appropriate  - Encourage maximum independence but intervene and supervise when necessary  - Involve family in performance of ADLs  - Assess for home care needs following discharge   - Consider OT consult to assist with ADL evaluation and planning for discharge  - Provide patient education as appropriate  Outcome: Not Progressing  Goal: Maintains/Returns to pre admission functional level  Description: INTERVENTIONS:  - Perform BMAT or MOVE assessment daily    - Set and communicate daily mobility goal to care team and patient/family/caregiver  - Collaborate with rehabilitation services on mobility goals if consulted  - Perform Range of Motion 2 times a day  - Reposition patient every self hours    - Dangle patient prn times a day  - Stand patient prn times a day  - Ambulate patient prn times a day  - Out of bed to chair prn times a day   - Out of bed for meals  prn times a day  - Out of bed for toileting  - Record patient progress and toleration of activity level   Outcome: Not Progressing

## 2022-04-21 NOTE — PLAN OF CARE
Problem: PAIN - ADULT  Goal: Verbalizes/displays adequate comfort level or baseline comfort level  Description: Interventions:  - Encourage patient to monitor pain and request assistance  - Assess pain using appropriate pain scale  - Administer analgesics based on type and severity of pain and evaluate response  - Implement non-pharmacological measures as appropriate and evaluate response  - Consider cultural and social influences on pain and pain management  - Notify physician/advanced practitioner if interventions unsuccessful or patient reports new pain  Outcome: Progressing     Problem: INFECTION - ADULT  Goal: Absence or prevention of progression during hospitalization  Description: INTERVENTIONS:  - Assess and monitor for signs and symptoms of infection  - Monitor lab/diagnostic results  - Monitor all insertion sites, i e  indwelling lines, tubes, and drains  - Monitor endotracheal if appropriate and nasal secretions for changes in amount and color  - [place] appropriate cooling/warming therapies per order  - Administer medications as ordered  - Instruct and encourage patient and family to use good hand hygiene technique  - Identify and instruct in appropriate isolation precautions for identified infection/condition  Outcome: Progressing     Problem: INFECTION - ADULT  Goal: Absence of fever/infection during neutropenic period  Description: INTERVENTIONS:  - Monitor WBC  Outcome: Progressing     Problem: SAFETY ADULT  Goal: Patient will remain free of falls  Description: INTERVENTIONS:  - Educate patient/family on patient safety including physical limitations  - Instruct patient to call for assistance with activity   - Consult OT/PT to assist with strengthening/mobility   - Keep Call bell within reach  - Keep bed low and locked with side rails adjusted as appropriate  - Keep care items and personal belongings within reach  - Initiate and maintain comfort rounds  - Make Fall Risk Sign visible to staff  - Offer Toileting every 2 Hours, in advance of need  - Initiate/Maintain bed alarm  - Apply yellow socks and bracelet for high fall risk patients  - Consider moving patient to room near nurses station  Outcome: Progressing     Problem: SAFETY ADULT  Goal: Maintain or return to baseline ADL function  Description: INTERVENTIONS:  -  Assess patient's ability to carry out ADLs; assess patient's baseline for ADL function and identify physical deficits which impact ability to perform ADLs (bathing, care of mouth/teeth, toileting, grooming, dressing, etc )  - Assess/evaluate cause of self-care deficits   - Assess range of motion  - Assess patient's mobility; develop plan if impaired  - Assess patient's need for assistive devices and provide as appropriate  - Encourage maximum independence but intervene and supervise when necessary  - Involve family in performance of ADLs  - Assess for home care needs following discharge   - Consider OT consult to assist with ADL evaluation and planning for discharge  - Provide patient education as appropriate  Outcome: Progressing     Problem: SAFETY ADULT  Goal: Maintains/Returns to pre admission functional level  Description: INTERVENTIONS:  - Perform BMAT or MOVE assessment daily    - Set and communicate daily mobility goal to care team and patient/family/caregiver     - Collaborate with rehabilitation services on mobility goals if consulted  - Out of bed for toileting  - Record patient progress and toleration of activity level   Outcome: Progressing     Problem: DISCHARGE PLANNING  Goal: Discharge to home or other facility with appropriate resources  Description: INTERVENTIONS:  - Identify barriers to discharge w/patient and caregiver  - Arrange for needed discharge resources and transportation as appropriate  - Identify discharge learning needs (meds, wound care, etc )  - Arrange for interpretive services to assist at discharge as needed  - Refer to Case Management Department for coordinating discharge planning if the patient needs post-hospital services based on physician/advanced practitioner order or complex needs related to functional status, cognitive ability, or social support system  Outcome: Progressing     Problem: Knowledge Deficit  Goal: Patient/family/caregiver demonstrates understanding of disease process, treatment plan, medications, and discharge instructions  Description: Complete learning assessment and assess knowledge base    Interventions:  - Provide teaching at level of understanding  - Provide teaching via preferred learning methods  Outcome: Progressing     Problem: Prexisting or High Potential for Compromised Skin Integrity  Goal: Skin integrity is maintained or improved  Description: INTERVENTIONS:  - Identify patients at risk for skin breakdown  - Assess and monitor skin integrity  - Assess and monitor nutrition and hydration status  - Monitor labs   - Assess for incontinence   - Turn and reposition patient  - Assist with mobility/ambulation  - Relieve pressure over bony prominences  - Avoid friction and shearing  - Provide appropriate hygiene as needed including keeping skin clean and dry  - Evaluate need for skin moisturizer/barrier cream  - Collaborate with interdisciplinary team   - Patient/family teaching  - Consider wound care consult   Outcome: Progressing

## 2022-04-21 NOTE — ASSESSMENT & PLAN NOTE
Lab Results   Component Value Date    EGFR 33 04/21/2022    EGFR 39 04/20/2022    EGFR 36 04/10/2022    CREATININE 1 41 (H) 04/21/2022    CREATININE 1 24 04/20/2022    CREATININE 1 33 (H) 04/10/2022   Mild elevation in creatinine, gentle hydration per Nephrology

## 2022-04-21 NOTE — CONSULTS
Consultation - Cardiology   Maria G He 81 y o  female MRN: [id number]  Unit/Bed#: -01 Encounter: [id number]        Inpatient consult to Cardiology  Consult performed by: Michelle Oneal MD  Consult ordered by: Charlette Jeronimo MD            Assessment/Plan   Principal Problem:    Shortness of breath  Active Problems:    Hypertension    Chronic combined systolic and diastolic congestive heart failure (HCC)    COPD (chronic obstructive pulmonary disease) (HCC)    Anxiety and depression    Paroxysmal atrial fibrillation (HCC)    Influenza    Hyperkalemia    Stage 3b chronic kidney disease ([facility] [address] )    Chronic systolic and diastolic heart failure:  Clinically she appears to be probably a bit dehydrated  She does have dyspnea on minimal exertion which could be secondary to underlying cardiomyopathy or deconditioning  Furthermore she has underlying influenza illness  Lab work reveals acute kidney injury with hyperkalemia  Discussed with Nephrology  Holding off on diuretics for now  She will receive gentle hydration  Cardiomyopathy status post AICD:  Telemetry shows sinus tachycardia with periods of atrial flutter with intermittent ventricular pacing  Device has not been checked in our system  Contacted [employer] rep for device interrogation to be hopefully performed during this hospitalization  Addendum: Device check showed atrial flutter with elevated VR since late last year and patient has not been BiV-pacing  Paroxysmal atrial flutter: On low-dose Eliquis appropriate for creatinine and age  Device interrogation reviewed  Atrial flutter with RVR noted  Rhythm control will be needed to achieve benefit of BiV pacing  I will discuss with family if they would like to explore rhythm control options  Acute kidney injury:  Diuretics held  Nephrology consult appreciated  IV hydration started  Monitor volume overload  Hyperkalemia:  Hold spironolactone  Received Kayexalate    Continue telemetry monitoring  COPD with deconditioning and severe pulmonary hypertension:  May need long-term oxygen  Discussed plan of care with patient's daughter and  all questions answered  Discussed also with primary team and Nephrology  Continue to follow closely  Contacted [employer] rep  to interrogate the device- results reviewed  Physician Requesting Consult: Justyna Cuellar MD    Reason for Consult / Principal Problem:   Chief Complaint   Patient presents with    Shortness of Breath     arrived via [facility] with c/o SOB, n/v that started this am         History obtained with the help of the patient's daughter who is present at bedside who is also a professional  for [employer]  HPI: Kristina Gonzalez is a 81y o  year old female with a history of systolic and diastolic heart failure, last EF of 30%, severe pulmonary hypertension, aortic sclerosis and mild-to-moderate mitral regurgitation on last echo in 12/21, admitted with generalized weakness, nausea, vomiting, headache and feeling unwell  She also reported dry cough and dyspnea since 2 days prior  Reported some 5 lb weight gain  She was on Lasix at home but recent lab work showed elevated BUN creatinine and her Lasix dose was decreased to every other day  Emergency room workup revealed influenza test to be positive  Acute kidney injury also was noted  Chest x-ray showed no pulmonary congestion or pleural effusions  CT of the chest also showed no pulmonary vascular congestion  She has longstanding history of cardiomyopathy of unclear etiology  She also has a history of atrial flutter and is status post [employer] AICD  Last admission and cardiac evaluation was in 12/21  She follows up in the clinic with Dr Blake Begun  At the last visit she was found to be in atrial flutter and was placed on low-dose Eliquis  Patient speaks predominantly [place]    History was obtained from chart review and  the patient's daughter  Her daughter works as a  for hospice at [employer]   There was a talk of her going back to [place] at her last visit  She however did not go back  This morning she feels a little better  She is still nauseated  Does have bouts of coughing  Reports a little wheezing at time  No chest pain  Her daughter is requesting further guidance regarding pursuing comfort measures and we discussed palliative care consultation to discuss goals of care in light of her recurrent hospitalizations for heart failure  She does not want any invasive procedures but once her mother to be comfortable and avoid hospitalization if possible for minor problems  Telemetry reviewed:  Atrial flutter  with intermittent ventricular paced rhythm  Her device has never been interrogated in our system  In fact her daughter mentions that this was going to be set up at the last office visit but was not arranged  She does not have remote device for transmissions set up as yet  We will try to arrange device interrogation during her inpatient stay  Review of labs:  BUN 44, creatinine 1 4  Potassium is now elevated at 6  BNP is 983  Influenza test is positive  ECHO: 12/2021:  Left Ventricle: Left ventricular cavity size is mildly to moderately dilated  The left ventricular ejection fraction is 30%  Systolic function is severely reduced  There is severe global hypokinesis with regional variation  There is eccentric hypertrophy    IVS: There is abnormal septal motion consistent with right ventricular pacing    Left Atrium: The atrium is mildly to moderately dilated    Aortic Valve: The aortic valve is trileaflet  The leaflets are moderately thickened  The leaflets are moderately calcified  The leaflets exhibit normal mobility  The valve appears sclerotic    Mitral Valve: There is annular calcification  There is mild to moderate regurgitation    Tricuspid Valve:  There is moderate regurgitation  The right ventricular systolic pressure is severely elevated  The estimated right ventricular systolic pressure is 44 4 mmHg    Pulmonary Artery: The pulmonary artery systolic pressure is severely increased  EK/22: Atrial flutter, variable block  Rate 116 BPM   LBBB  Review of Systems   Constitutional: Positive for appetite change, fatigue and fever  HENT: Positive for congestion  Eyes: Negative  Respiratory: Positive for apnea, cough, shortness of breath and wheezing  Cardiovascular: Negative for chest pain and palpitations  Gastrointestinal: Positive for nausea and vomiting  Endocrine: Negative  Genitourinary: Negative  Musculoskeletal: Positive for arthralgias and gait problem  Skin: Negative  Allergic/Immunologic: Negative  Neurological: Positive for weakness  Hematological: Does not bruise/bleed easily  Psychiatric/Behavioral: Positive for sleep disturbance  The patient is nervous/anxious           Historical Information   Past Medical History:   Diagnosis Date    Anxiety and depression 2022    Atrial flutter ([place] [address] ) 2022    BMI 27 0-27 9,adult 2021    CH[facility])     Congestive heart failure (CHF) ([place] [address] ) 2021    COPD [facility]chronic obstructive pulmonary disease) [facility])     COPD (chronic obstructive pulmonary disease) ([place] [address] ) 2021    Diabetes 1 5, managed as type 2 ([place] [address] )     Fatigue 2022    Gastroesophageal reflux disease without esophagitis 2021    High blood pressure     Hyperglycemia 2022    Hypertension 2021    Hypertensive heart disease with congestive heart failure ([place] [address] ) 2021    Left knee pain 2022    Obstructive sleep apnea syndrome 2021    Other specified anemias 2022    Paroxysmal atrial fibrillation ([place] [address] ) 2022    Status post implantation of automatic cardioverter/defibrillator (AICD) 2021    Vitamin B12 deficiency 2021    Vitamin D deficiency 2021     Past Surgical History:   Procedure Laterality Date    ATRIAL CARDIAC PACEMAKER INSERTION      REPLACEMENT TOTAL KNEE       Social History     Substance and Sexual Activity   Alcohol Use Never     Social History     Substance and Sexual Activity   Drug Use Never     Social History     Tobacco Use   Smoking Status Former Smoker    Packs/day: 2 00    Years: 50 00    Pack years: 100 00    Types: Cigarettes    Quit date:     Years since quittin 3   Smokeless Tobacco Never Used     Family History   Problem Relation Age of Onset    Diabetes Sister     Diabetes Brother     Heart disease Daughter     Depression Paternal Grandmother        Allergies:  No Known Allergies    Medications:     Current Facility-Administered Medications:     acetaminophen (TYLENOL) tablet 650 mg, 650 mg, Oral, Q6H PRN, Alex Armenta MD, 650 mg at 22 1430    albuterol (PROVENTIL HFA,VENTOLIN HFA) inhaler 2 puff, 2 puff, Inhalation, Q6H PRN, Alex Armenta MD    albuterol inhalation solution 10 mg, 10 mg, Nebulization, Once, Alex Armenta MD    apixaban (ELIQUIS) tablet 2 5 mg, 2 5 mg, Oral, BID, Alex Armenta MD, 2 5 mg at 22 1737    calcium gluconate 1 g in sodium chloride 0 9% 50 mL (premix), 1 g, Intravenous, Once, Alex Armenta MD    carvedilol (COREG) tablet 6 25 mg, 6 25 mg, Oral, BID With Meals, Alex Armenta MD    cyanocobalamin (VITAMIN B-12) tablet 1,000 mcg, 1,000 mcg, Oral, Early Morning, Alex Armenta MD, 1,000 mcg at 22 0515    dextrose 50 % IV solution 25 mL, 25 mL, Intravenous, Once, Alex Armenta MD    escitalopram (LEXAPRO) tablet 10 mg, 10 mg, Oral, Daily, Alex Armenta MD, 10 mg at 22 1226    insulin regular (HumuLIN R,NovoLIN R) injection 10 Units, 10 Units, Intravenous, Once, Alex Armenta MD    isosorbide mononitrate (IMDUR) 24 hr tablet 30 mg, 30 mg, Oral, Daily, Angelo Andrews MD, 30 mg at 04/20/22 1226    nystatin (MYCOSTATIN) powder, , Topical, BID, Danelle Canchola MD, Given at 04/20/22 2146    ondansetron (ZOFRAN) injection 4 mg, 4 mg, Intravenous, Q6H PRN, Angelo Andrews MD    oseltamivir (TAMIFLU) capsule 30 mg, 30 mg, Oral, Q24H, Angelo Andrews MD    Insert peripheral IV, , , Once **AND** sodium chloride (PF) 0 9 % injection 3 mL, 3 mL, Intravenous, Q1H PRN, Medardo Reyes,     sodium polystyrene (KAYEXALATE) powder 15 g, 15 g, Oral, Once, Angelo Andrews MD     Objective:   Vitals:   Vitals:    04/21/22 0741   BP: 100/59   Pulse: (!) 108   Resp: 18   Temp: (!) 97 4 °F (36 3 °C)   SpO2: 96%     Body mass index is 34 45 kg/m²  Orthostatic Blood Pressures      Most Recent Value   Blood Pressure 100/59 filed at 04/21/2022 0741   Patient Position - Orthostatic VS Lying filed at 04/21/2022 5953        Systolic (23UIQ), OFB:871 , Min:82 , QFT:641     Diastolic (14YMV), OAV:96, Min:46, Max:67      Intake/Output Summary (Last 24 hours) at 4/21/2022 0851  Last data filed at 4/21/2022 0528  Gross per 24 hour   Intake 160 ml   Output 400 ml   Net -240 ml     Weight (last 2 days)     Date/Time Weight    04/20/22 1026 60 1 (132 5)        Invasive Devices  Report    Peripheral Intravenous Line            Peripheral IV 04/20/22 Left Antecubital 1 day                Physical Exam  Vitals reviewed  Constitutional:       General: She is not in acute distress  Appearance: She is obese  HENT:      Head: Normocephalic  Right Ear: External ear normal       Left Ear: External ear normal       Mouth/Throat:      Mouth: Mucous membranes are dry  Eyes:      General: No scleral icterus  Neck:      Vascular: No carotid bruit or JVD  Cardiovascular:      Rate and Rhythm: Regular rhythm  Tachycardia present  Heart sounds: S1 normal and S2 normal  Murmur heard  Crescendo systolic murmur is present with a grade of 2/6        Pulmonary: Effort: Tachypnea and accessory muscle usage present  Breath sounds: Decreased breath sounds and wheezing present  No rhonchi or rales  Chest:      Chest wall: No tenderness  Abdominal:      General: There is no distension  Musculoskeletal:      Right lower leg: No edema  Left lower leg: No edema  Skin:     General: Skin is warm  Findings: No lesion  Neurological:      General: No focal deficit present  Mental Status: She is alert  Psychiatric:      Comments: Appears very anxious         Labs:       CBC with diff:   Results from last 7 days   Lab Units 04/21/22  0510 04/20/22  0712   WBC Thousand/uL 4 43 11 01*   HEMOGLOBIN g/dL 13 0 15 2   HEMATOCRIT % 44 4 49 5*   MCV fL 87 83   PLATELETS Thousands/uL 186 246   MCH pg 25 4* 25 6*   MCHC g/dL 29 3* 30 7*   RDW % 14 3 14 4   MPV fL 10 3 9 9   NRBC AUTO /100 WBCs 0 0     CMP:  Results from last 7 days   Lab Units 04/21/22  0510 04/20/22  0712   POTASSIUM mmol/L 6 0* 5 4*   CHLORIDE mmol/L 101 98   CO2 mmol/L 27 29   BUN mg/dL 44* 39*   CREATININE mg/dL 1 41* 1 24   CALCIUM mg/dL 8 8 9 5   AST U/L  --  17   ALT U/L  --  14   ALK PHOS U/L  --  68   EGFR ml/min/1 73sq m 33 39     NT-proBNP: No results found for: NTBNP   Magnesium:    Coags:  Results from last 7 days   Lab Units 04/20/22  0712   PTT seconds 26   INR  1 07       Imaging & Testing   Cardiac testing:       Imaging:   I have personally reviewed pertinent films in PACS  X-ray chest 1 view portable    Result Date: 4/20/2022  Narrative: CHEST INDICATION:   chest pain  Extreme shortness of breath  COMPARISON:  Chest radiograph from 12/17/2021 and chest CT from 12/18/2021  EXAM PERFORMED/VIEWS:  XR CHEST PORTABLE FINDINGS: Mild cardiomegaly with left subclavian ICD leads in the right atrium and 2 leads in the right ventricle  The lungs are clear  No pneumothorax or pleural effusion  Osseous structures appear within normal limits for patient age       Impression: No acute cardiopulmonary disease  Workstation performed: [id number]     XR knee 3 vw left non injury    Result Date: 4/18/2022  Narrative: LEFT KNEE INDICATION:   M25 562: Pain in left knee G89 29: Other chronic pain  COMPARISON:  None VIEWS:  XR KNEE 3 VW LEFT NON INJURY FINDINGS: There is no acute fracture or dislocation  There is no joint effusion  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  No lytic or blastic osseous lesion  Soft tissues are unremarkable  Impression: 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  Workstation performed: [id number]     CT chest without contrast    Result Date: 4/20/2022  Narrative: CT CHEST WITHOUT IV CONTRAST INDICATION:   eval for pneumonia/ fluid overload  COMPARISON:  CTA chest 12/18/2021; chest x-ray 4/20/2022 TECHNIQUE: CT examination of the chest was performed without intravenous contrast   Axial, sagittal, and coronal 2D reformatted images were created from the source data and submitted for interpretation  Radiation dose length product (DLP) for this visit:  185 mGy-cm   This examination, like all CT scans performed in the [facility], was performed utilizing techniques to minimize radiation dose exposure, including the use of iterative reconstruction and automated exposure control  FINDINGS: LUNGS:  A few small stable right upper and lower lobe nodules are again noted without change  Small calcified granuloma in the middle lobe again identified  Mild scar and/or subsegmental atelectasis in both lung bases is noted  Lungs are otherwise clear  Central airways are patent PLEURA:  Unremarkable  HEART/GREAT VESSELS: Heart is mildly enlarged  AICD leads are present  No thoracic aortic aneurysm  MEDIASTINUM AND FRANKIE:  Unremarkable  CHEST WALL AND LOWER NECK:  Implantable AICD again noted left chest wall  VISUALIZED STRUCTURES IN THE UPPER ABDOMEN:  Unremarkable   OSSEOUS STRUCTURES:  No acute fracture or destructive osseous lesion  Impression: 1  Few small stable scattered 2 mm pulmonary nodules again noted  Based on current Fleischner Society 2017 Guidelines on incidental pulmonary nodule, no routine follow-up is needed if the patient is low risk  If the patient is high risk, optional follow-up chest CT at 12 months can be considered  2   Additional findings as noted  Workstation performed: [id number]       Jl Mosley MD, [facility] [place]      Portions of the record may have been created with voice recognition software  Occasional wrong word or "sound a like" substitutions may have occurred due to the inherent limitations of voice recognition software  Please read the chart carefully and recognize, using context, where substitutions have occurred  Please call the Dieudonne Rumps of the note for any clarifications

## 2022-04-21 NOTE — ASSESSMENT & PLAN NOTE
Wt Readings from Last 3 Encounters:   04/20/22 60 1 kg (132 lb 7 9 oz)   04/01/22 60 3 kg (133 lb)   02/01/22 58 6 kg (129 lb 3 2 oz)     Improved however patient has low EF and poor quality of life  Continue on Imdur, carvedilol    Hold Lasix due to elevated creatinine  IV fluids per Nephrology

## 2022-04-21 NOTE — PHYSICAL THERAPY NOTE
PHYSICAL THERAPY EVALUATION  Time: 7159-2193    NAME:  Aure Bowden  DATE: 04/21/22    AGE:   81 y o  Mrn:   [id number]  Length Of Stay: 1    ADMIT DX:  Shortness of breath [R06 02]  Hyperkalemia [E87 5]  Hyponatremia [E87 1]  Nausea and vomiting [R11 2]  Influenza A [J10 1]  CHF exacerbation ([place] [address] ) [I50 9]    Past Medical History:   Diagnosis Date    Anxiety and depression 1/26/2022    Atrial flutter ([place] [address] ) 1/26/2022    BMI 27 0-27 9,adult 12/16/2021    [facility]congestive [facility] failure) [facility])     Congestive heart failure (CHF) ([place] [address] ) 12/16/2021    COPD (chronic obstructive pulmonary disease) ([facility])     COPD (chronic obstructive pulmonary disease) ([place] [address] ) 12/16/2021    Diabetes 1 5, managed as type 2 ([place] [address] )     Fatigue 4/1/2022    Gastroesophageal reflux disease without esophagitis 12/16/2021    High blood pressure     Hyperglycemia 4/1/2022    Hypertension 12/16/2021    Hypertensive heart disease with congestive heart failure ([place] [address] ) 12/16/2021    Left knee pain 4/1/2022    Obstructive sleep apnea syndrome 12/16/2021    Other specified anemias 1/26/2022    Paroxysmal atrial fibrillation ([place] [address] ) 4/1/2022    Status post implantation of automatic cardioverter/defibrillator (AICD) 12/16/2021    Vitamin B12 deficiency 12/16/2021    Vitamin D deficiency 12/16/2021     Past Surgical History:   Procedure Laterality Date    ATRIAL CARDIAC PACEMAKER INSERTION      REPLACEMENT TOTAL KNEE         Performed at least 2 patient identifiers during session: Name and ID bracelet        04/21/22 0958   PT Last Visit   PT Visit Date 04/21/22   Note Type   Note type Evaluation   Pain Assessment   Pain Assessment Tool 0-10   Pain Score No Pain   Effect of Pain on Daily Activities n/a   Hospital Pain Intervention(s) Repositioned; Ambulation/increased activity   Multiple Pain Sites No   Restrictions/Precautions   Weight Bearing Precautions Per Order No   Other Precautions Contact/isolation;Droplet precautions; Chair Alarm; Bed Alarm;Telemetry; Fall Risk;O2  (+FluA, +1L O2 via NC, +language barrer)   Home Living   Type of Home Apartment  (3rd apartment)   Home Layout One level;Stairs to enter with rails  (16 STR with HR, then single level living)   Bathroom Shower/Tub Tub/shower unit   Bathroom Toilet Standard   Bathroom Equipment Other (Comment)  (none)   [address] Walker;Cane  ((melanie walker) - does not use at baseline)   Additional Comments OF NOTE, pt plans to D/C to her other daughter's home d/t home layout temporarily (~3 weeks)  Will be in 1 story apartment with elevator access, handicapped accessible bathroom with grab bars, but no DME  Will have increased family supports, and (almost) 24hr care  Prior Function   Level of [place] Other (Comment)  (independent household mobility, assist with ADLs & IADLs)   Lives With Daughter   Receives Help From Family   ADL Assistance Needs assistance   IADLs Needs assistance   Falls in the last 6 months 1 to 4  (per dtr report: 1 fall)   Vocational Retired   Comments Pt currently living with her daughter, however her daughter works during the daytime, can stop in/out as needed, also has a camera in the home  Pt plans to d/c to her other daughter's home temporarily, where she does not have to complete stair negotiation  Overall pt is limited in her ADLs and functional mobility due to SOB/respiratory status  General   Additional Pertinent History Pt admitted 4/20/2022 with c/o SOB, nausea & vomiting, dx: influenza A, CHF exacerbation     Family/Caregiver Present Yes  (daughter present t/o session)   Cognition   Overall Cognitive Status WFL   Arousal/Participation Cooperative   Attention Within functional limits   Orientation Level Oriented X4   Memory Within functional limits   Following Commands Follows one step commands with increased time or repetition   Comments Pt primarily Sinhala speaking only, declines use of [place] , daughter present t/o session and able to effectively/approrpriately translate  RUE Assessment   RUE Assessment WFL   LUE Assessment   LUE Assessment WFL   RLE Assessment   RLE Assessment WFL   LLE Assessment   LLE Assessment WFL   Vision-Basic Assessment   Current Vision Does not wear glasses  (has glasses, chooses not to wear)   Coordination   Movements are Fluid and Coordinated 1   Sensation WFL   Light Touch   RLE Light Touch Grossly intact   LLE Light Touch Grossly intact   Proprioception   RLE Proprioception Grossly intact   LLE Proprioception Grossly Intact   Bed Mobility   Supine to Sit 6  Modified independent   Additional items HOB elevated; Bedrails   Sit to Supine Unable to assess   Additional Comments Pt was left seated OOB in chair with daughter present  Transfers   Sit to Stand 5  Supervision   Additional items Assist x 1; Increased time required;Verbal cues   Stand to Sit 5  Supervision   Additional items Assist x 1; Increased time required;Verbal cues   Stand pivot 5  Supervision   Additional items Assist x 1; Increased time required;Verbal cues   Ambulation/Elevation   Gait pattern WNL; Short stride;Decreased foot clearance   Gait Assistance 5  Supervision   Additional items Assist x 1;Verbal cues  (intermittent HHA)   Assistive Device None   Distance 30ft x1 - limited due to SOB and pt generalized fatigue   Stair Management Assistance Not tested   Balance   Static Sitting Good   Dynamic Sitting Fair +   Static Standing Fair +  (w/ no UE support)   Dynamic Standing Fair +  (w/ no UE support)   Ambulatory Fair +  (w/ no UE support)   Endurance Deficit   Endurance Deficit Yes   Endurance Deficit Description Pt with high level of fatigue, generalized deconditioning, limited due to impaired respiratory status (pt on 1L O2 via NC)  However during session pt with minimal degree of SOB     Activity Tolerance   Activity Tolerance Patient limited by fatigue   Medical Staff Made Aware Spoke with SLIM, CM, OT, RT   Nurse Made Aware Spoke with ERIC Galloway pre/post session   Assessment   Prognosis Good   Problem List Decreased strength;Decreased endurance; Impaired balance;Decreased mobility   Assessment Pt seen for PT evaluation, cleared by ERIC Galloway, activity orders of "up with assist"  PT consult received for mobility assessment & discharge needs  Pt admitted 4/20/2022 w/ SOB, nausea, vomiting, +flu A, dx Shortness of breath  Comorbidities affecting pt's fnxl performance include: falls, HTN, COPD, anxiety, depression, Afib, CKD  PTA, pt was independent with functional mobility without assistive device, requiring assist for ADLS, requiring assist for IADLS and living with her daughter in a 1 level home with 16 steps to enter (but plans to stay with other daughter temporarily where she does not have stairs)  During PT IE, pt completes bed mobility at janey level, transfers and ambulates w/in the room with no AD and s  The AM-PAC objective tool in combination w/ Barthel Index outcome tool were used to assist in determining pt safety w/ mobility/ADLs & appropriate d/c recommendations, see above for scores  Pt is at risk of falls d/t multiple comorbidities, h/o falls, impaired balance, acuity of medical illness and ongoing medical treatment of primary dx  Pt's clinical presentation is currently unstable/unpredictable as seen in pt's presentation of increased fall risk, new onset of impairment of functional mobility, decreased endurance and new onset of weakness, and requires high complexity clinical decision making  Pt will benefit from continued PT treatment in order to address impairments, decrease risk of falls, maximize independence w/ fnxl mobility, & ensure safety w/ mobility for transition to next level of care  Based on pt presentation & impairments, pt would most appropriately benefit from return to home with home health PT services      Barriers to Discharge Inaccessible home environment   Goals   Patient Goals daughter's goal for pt is to increase her strength and endurance in order to negotiate the stairs  STG Expiration Date 05/01/22   Short Term Goal #1 Patient PT goals established in order to address patient self reported goal of increase her strength and endurance in order to negotiate the stairs  Pt will: complete all transfers independently in order to increase safety with functional mobility; ambulate >80ft with LRAD at janey level in order to increase safety with household distance functional mobility; negotiate 8-10 stairs with HR assist and S in order to facilitate ability to negotiate 16 stairs into daughter's apartment; demonstrate understanding and independence with LE strengthening HEP; improve ambulatory balance to >/= good grade in order to promote safety and increased independence with mobility; tolerate >3hrs OOB in upright position, in order to improve muscular endurance and respiratory status; improve AM-PAC score to >/= 24/24 in order to increase independence with mobility and decrease burden of care; improve Barthel Index score to >/= 65/100 in order to increase independence and decrease risk of falls  PT Treatment Day 0   Plan   Treatment/Interventions Functional transfer training;LE strengthening/ROM; Elevations; Therapeutic exercise; Endurance training;Gait training;Spoke to nursing;Spoke to case management   PT Frequency 3-5x/wk   Recommendation   PT Discharge Recommendation Home with home health rehabilitation   AM-PAC Basic Mobility Inpatient   Turning in Bed Without Bedrails 4   Lying on Back to Sitting on Edge of Flat Bed 4   Moving Bed to Chair 3   Standing Up From Chair 3   Walk in Room 3   Climb 3-5 Stairs 3   Basic Mobility Inpatient Raw Score 20   Basic Mobility Standardized Score 43 99   Highest Level Of Mobility   JH-HLM Goal 6: Walk 10 steps or more   JH-HLM Highest Level of Mobility 7: Walk 25 feet or more   JH-HLM Goal Achieved Yes   Modified [place] Scale   Modified [place] Scale 4 Barthel Index   Feeding 10   Bathing 0   Grooming Score 0   Dressing Score 5   Bladder Score 10   Bowels Score 10   Toilet Use Score 5   Transfers (Bed/Chair) Score 10   Mobility (Level Surface) Score 0   Stairs Score 5   Barthel Index Score 55   End of Consult   Patient Position at End of Consult Bedside chair; All needs within reach  (daughter in room, RN aware of pt status)   End of Consult Comments Based on patient's Megan Copper Highest Level of Mobility scores today, patient currently has a goal of [facility] Levels: 7: WALK 25 FEET OR MORE, to be completed with RN staffing each shift, in order to improve overall activity tolerance and mobility, combat hospital related deconditioning, and maximize outcomes for d/c from the acute care setting  The patient's AM-PAC Basic Mobility Inpatient Short Form Raw Score is 20  A Raw score of greater than 16 suggests the patient may benefit from discharge to home  Please also refer to the recommendation of the Physical Therapist for safe discharge planning            Tania Brandon, PT, DPT   Available via [employer]  NPI # [id number]  [place] License - [id number]  9/96/7726

## 2022-04-21 NOTE — CONSULTS
Consultation - Nephrology   Ember Grajeda 81 y o  female MRN: [id number]  Unit/Bed#: -01 Encounter: [id number]    ASSESSMENT and PLAN:  Hyperkalemia, POA  -admission potassium was 5 4 and has trended up further to 6 0 on 04/21  -hyperkalemia likely due to combination of volume depletion plus being on spironolactone at home and from dietary indiscretion  Had orange juice this morning  -continue to hold spironolactone  -ordered for normal saline 250 mL bolus followed by normal saline at 50 mL/hour for next 20 hours  Check CK level for concern of myositis in the setting of influenza  -medical treatment with calcium gluconate, albuterol inhalation, insulin  Also received Kayexalate  -repeat potassium level at 1:00 p m  If found to be elevated will need repeat medical treatment  Elevated serum creatinine  -baseline creatinine from December 2021 was around 0 9-1 1 but has been elevated to 1 33 on 04/10  -admission creatinine on 04/20 was 1 24  -creatinine has trended up to 1 4 on 04/21  -UA with 4-10 RBC per high-power field, 0-1 wbc's, and 2+ blood  Check CK level  -likely from volume depletion in the setting of decreased oral intake and being on diuretics at home  -denies any intake of NSAIDs  -will check bladder scan, also IV hydration as mentioned above  Monitor renal function  Shortness of breaths suspected secondary to influenza  CT chest as well as chest x-ray not suggestive of fluid overload  Chronic systolic and diastolic CHF:  Previous echocardiogram from December 2021 showed ejection fraction 30% with severe pulmonary hypertension with right ventricular systolic pressure 71 mm Hg   -at this time clinically appears volume depleted and chest x-ray not suggestive of any fluid overload  Holding diuretics as mentioned above in the setting of elevated creatinine      Influenza management per primary team    Others paroxysmal AFib/anxiety/depression/COPD:  Management per primary team    Discussed with primary team as well as with Cardiology    HISTORY OF PRESENT ILLNESS:  Requesting Physician: Alex Armenta MD  Reason for Consult:  Hyperkalemia, chronic kidney disease stage 3    Vimal Hernandez is a 81 y o  female who was admitted to [facility] after presenting with complain of headache, weakness, nausea, vomiting  Also complain of dry cough and shortness of breath for 1 day  She had labs done by PCP which showed elevated BUN and creatinine and so was told to decrease Lasix to make it every other day  Thelda Fardeanna     PAST MEDICAL HISTORY:  Past Medical History:   Diagnosis Date    Anxiety and depression 1/26/2022    Atrial flutter ([facility] ) 1/26/2022    BMI 27 0-27 9,adult 12/16/2021    [facility]     Congestive heart failure (CHF) ([address] ) 12/16/2021    COPD [facility]chronic obstructive pulmonary disease) ([facility])     COPD (chronic obstructive pulmonary disease) ([facility] ) 12/16/2021    Diabetes 1 5, managed as type 2 ([facility] )     Fatigue 4/1/2022    Gastroesophageal reflux disease without esophagitis 12/16/2021    High blood pressure     Hyperglycemia 4/1/2022    Hypertension 12/16/2021    Hypertensive heart disease with congestive heart failure ([address] ) 12/16/2021    Left knee pain 4/1/2022    Obstructive sleep apnea syndrome 12/16/2021    Other specified anemias 1/26/2022    Paroxysmal atrial fibrillation ([address] ) 4/1/2022    Status post implantation of automatic cardioverter/defibrillator (AICD) 12/16/2021    Vitamin B12 deficiency 12/16/2021    Vitamin D deficiency 12/16/2021       PAST SURGICAL HISTORY:  Past Surgical History:   Procedure Laterality Date    ATRIAL CARDIAC PACEMAKER INSERTION      REPLACEMENT TOTAL KNEE         SOCIAL HISTORY:  Social History     Substance and Sexual Activity   Alcohol Use Never     Social History     Substance and Sexual Activity   Drug Use Never     Social History     Tobacco Use   Smoking Status Former Smoker    Packs/day: 2 00  Years: 50 00    Pack years: 100 00    Types: Cigarettes    Quit date:     Years since quittin 3   Smokeless Tobacco Never Used       FAMILY HISTORY:  Family History   Problem Relation Age of Onset    Diabetes Sister     Diabetes Brother     Heart disease Daughter     Depression Paternal Grandmother        ALLERGIES:  No Known Allergies    MEDICATIONS:    Current Facility-Administered Medications:     acetaminophen (TYLENOL) tablet 650 mg, 650 mg, Oral, Q6H PRN, Francine Andrews MD, 650 mg at 22 1430    albuterol (PROVENTIL HFA,VENTOLIN HFA) inhaler 2 puff, 2 puff, Inhalation, Q6H PRN, Francine Andrews MD    albuterol inhalation solution 10 mg, 10 mg, Nebulization, Once, Francine Andrews MD    apixaban (ELIQUIS) tablet 2 5 mg, 2 5 mg, Oral, BID, Francine Andrews MD, 2 5 mg at 22 1737    calcium gluconate 1 g in sodium chloride 0 9% 50 mL (premix), 1 g, Intravenous, Once, Francine Andrews MD    carvedilol (COREG) tablet 6 25 mg, 6 25 mg, Oral, BID With Meals, Francine Andrews MD    cyanocobalamin (VITAMIN B-12) tablet 1,000 mcg, 1,000 mcg, Oral, Early Morning, Francine Andrews MD, 1,000 mcg at 22 0515    dextrose 50 % IV solution 25 mL, 25 mL, Intravenous, Once, Francine Andrews MD    escitalopram (LEXAPRO) tablet 10 mg, 10 mg, Oral, Daily, Francine Andrews MD, 10 mg at 22 1226    insulin regular (HumuLIN R,NovoLIN R) injection 10 Units, 10 Units, Intravenous, Once, Francine Andrews MD    isosorbide mononitrate (IMDUR) 24 hr tablet 30 mg, 30 mg, Oral, Daily, Francine Andrews MD, 30 mg at 22 1226    nystatin (MYCOSTATIN) powder, , Topical, BID, Izabel Gross MD, Given at 22 2146    ondansetron (ZOFRAN) injection 4 mg, 4 mg, Intravenous, Q6H PRN, Francine Andrews MD    oseltamivir (TAMIFLU) capsule 30 mg, 30 mg, Oral, Q24H, Francine Andrews MD    Insert peripheral IV, , , Once **AND** sodium chloride (PF) 0 9 % injection 3 mL, 3 mL, Intravenous, Q1H PRN, Medardo Reyes DO    sodium polystyrene (KAYEXALATE) powder 15 g, 15 g, Oral, Once, Leni Sicard, MD    REVIEW OF SYSTEMS:   Review of Systems   Constitutional: Negative for activity change, appetite change, chills, diaphoresis, fatigue and fever  HENT: Negative for congestion, facial swelling and nosebleeds  Eyes: Negative for pain and visual disturbance  Respiratory: Negative for cough, chest tightness and shortness of breath  Cardiovascular: Negative for chest pain and palpitations  Gastrointestinal: Negative for abdominal distention, abdominal pain, diarrhea, nausea and vomiting  Genitourinary: Negative for difficulty urinating, dysuria, flank pain, frequency and hematuria  Musculoskeletal: Negative for arthralgias, back pain and joint swelling  Skin: Negative for rash  Neurological: Negative for dizziness, seizures, syncope, weakness and headaches  Psychiatric/Behavioral: Negative for agitation and confusion  The patient is not nervous/anxious  All the systems were reviewed and were negative except as documented on the HPI  PHYSICAL EXAM:  Current Weight: Weight - Scale: 60 1 kg (132 lb 7 9 oz)  First Weight: Weight - Scale: 60 1 kg (132 lb 7 9 oz)  Vitals:    04/20/22 2150 04/21/22 0153 04/21/22 0352 04/21/22 0741   BP: 103/63  114/67 100/59   BP Location: Right arm  Left arm Right arm   Pulse: 97  94 (!) 108   Resp: 17  17 18   Temp: 98 1 °F (36 7 °C) 98 7 °F (37 1 °C) 98 2 °F (36 8 °C) (!) 97 4 °F (36 3 °C)   TempSrc: Oral Tympanic Oral Tympanic   SpO2: 99%  99% 96%   Weight:       Height:           Intake/Output Summary (Last 24 hours) at 4/21/2022 0857  Last data filed at 4/21/2022 2931  Gross per 24 hour   Intake 160 ml   Output 400 ml   Net -240 ml     Physical Exam  Constitutional:       General: She is not in acute distress  Appearance: Normal appearance  She is well-developed     HENT:      Head: Normocephalic and atraumatic  Nose: Nose normal       Mouth/Throat:      Mouth: Mucous membranes are moist    Eyes:      General: No scleral icterus  Conjunctiva/sclera: Conjunctivae normal       Pupils: Pupils are equal, round, and reactive to light  Neck:      Thyroid: No thyromegaly  Vascular: No JVD  Cardiovascular:      Rate and Rhythm: Normal rate and regular rhythm  Heart sounds: Normal heart sounds  No murmur heard  No friction rub  Pulmonary:      Effort: Pulmonary effort is normal  No respiratory distress  Breath sounds: Normal breath sounds  No wheezing or rales  Abdominal:      General: Bowel sounds are normal  There is no distension  Palpations: Abdomen is soft  Tenderness: There is no abdominal tenderness  Musculoskeletal:         General: No deformity  Cervical back: Neck supple  Right lower leg: No edema  Left lower leg: No edema  Skin:     General: Skin is warm and dry  Findings: No rash  Neurological:      Mental Status: She is alert and oriented to person, place, and time  Psychiatric:         Mood and Affect: Mood normal          Behavior: Behavior normal          Thought Content: Thought content normal            Invasive Devices:        Lab Results:   Results from last 7 days   Lab Units 04/21/22  0510 04/20/22  0712   WBC Thousand/uL 4 43 11 01*   HEMOGLOBIN g/dL 13 0 15 2   HEMATOCRIT % 44 4 49 5*   PLATELETS Thousands/uL 186 246   POTASSIUM mmol/L 6 0* 5 4*   CHLORIDE mmol/L 101 98   CO2 mmol/L 27 29   BUN mg/dL 44* 39*   CREATININE mg/dL 1 41* 1 24   CALCIUM mg/dL 8 8 9 5   ALK PHOS U/L  --  68   ALT U/L  --  14   AST U/L  --  17       Other Studies:  CT chest without contrast  1  Few small stable scattered 2 mm pulmonary nodules again noted  Based on current Fleischner Society 2017 Guidelines on incidental pulmonary nodule, no routine follow-up is needed if the patient is low risk    If the patient is high risk, optional follow-up chest CT at 12 months can be considered  CHEST X-RAY PERSONALLY REVIEWED BY ME, NO ACUTE CARDIOPULMONARY DISEASE  Portions of the record may have been created with voice recognition software  Occasional wrong word or "sound a like" substitutions may have occurred due to the inherent limitations of voice recognition software  Read the chart carefully and recognize, using context, where substitutions have occurred  If you have any questions, please contact the dictating provider

## 2022-04-21 NOTE — PROGRESS NOTES
[address] 46  Progress Note - Kristina Lisa 1935, 81 y o  female MRN: [id number]  Unit/Bed#: -01 Encounter: [id number]  Primary Care Provider: Vickie Avitia MD   Date and time admitted to hospital: 4/20/2022  6:56 AM    * Shortness of breath  Assessment & Plan  Multifactorial likely congestive heart failure, influenza  Reports improvement in symptoms however still short of breath on exertion  Discussed with Cardiology, Nephrology  Given her poor prognosis cardiology recommended palliative care  Patient daughter is agreeable for this  Will consult palliative team   Continue supplemental oxygen as needed    Influenza  Assessment & Plan  Tamiflu currently afebrile    Chronic combined systolic and diastolic congestive heart failure (HCC)  Assessment & Plan  Wt Readings from Last 3 Encounters:   04/20/22 60 1 kg (132 lb 7 9 oz)   04/01/22 60 3 kg (133 lb)   02/01/22 58 6 kg (129 lb 3 2 oz)     Improved however patient has low EF and poor quality of life  Continue on Imdur, carvedilol  Hold Lasix due to elevated creatinine  IV fluids per Nephrology        Stage 3b chronic kidney disease [facility])  Assessment & Plan  Lab Results   Component Value Date    EGFR 33 04/21/2022    EGFR 39 04/20/2022    EGFR 36 04/10/2022    CREATININE 1 41 (H) 04/21/2022    CREATININE 1 24 04/20/2022    CREATININE 1 33 (H) 04/10/2022   Mild elevation in creatinine, gentle hydration per Nephrology    Hyperkalemia  Assessment & Plan  Give calcium gluconate, insulin, dextrose, Kayexalate  Appreciate Nephrology input renal diet IV fluids    Paroxysmal atrial fibrillation (HCC)  Assessment & Plan  Continue carvedilol and Eliquis  Anxiety and depression  Assessment & Plan  Continue escitalopram     COPD (chronic obstructive pulmonary disease) ([facility] [address] )  Assessment & Plan  Currently not in acute exacerbation, continue Ventolin as needed      Hypertension  Assessment & Plan  Continue on home medications  Subjective/Objective     Subjective:   Patient feels better, she states she has dry cough, shortness of breath is better compared to yesterday  However patient is experiencing shortness of breath with simple movement  Denies any fever, chills  Objective:  Vitals: Blood pressure 113/74, pulse 105, temperature 98 7 °F (37 1 °C), temperature source Oral, resp  rate 16, height 4' 4" (1 321 m), weight 60 1 kg (132 lb 7 9 oz), SpO2 96 %  ,Body mass index is 34 45 kg/m²  Intake/Output Summary (Last 24 hours) at 4/21/2022 1146  Last data filed at 4/21/2022 1015  Gross per 24 hour   Intake 160 ml   Output 500 ml   Net -340 ml       Invasive Devices  Report    Peripheral Intravenous Line            Peripheral IV 04/20/22 Left Antecubital 1 day                Physical Exam:  General appearance:  Patient not in acute distress  Eyes:  Pupils equal reacting to light  ENT:  Moist oral mucous membranes  CVS:  S1-S2 heard, regular rate and rhythm, no pedal edema  Chest:  Bilateral air entry present, clear to auscultation, no wheezing, crackles today  Abdomen:  Soft, nontender, bowel sounds present  CNS:  No focal neurological deficits  Genitourinary: deferred  Skin:  No acute rash   psychiatric:  No psychosis  Musculoskeletal:  No joint deformities    Lab, Imaging and other studies: I have personally reviewed pertinent reports      VTE Pharmacologic Prophylaxis:  Eliquis  VTE Mechanical Prophylaxis: sequential compression device     Discussed with the patient's daughter at the bedside, discussed with the patient regarding further management and plan  Discussed with the nursing staff  Discussed with Cardiology and Nephrology    Possible discharge in the next 24-48 hours

## 2022-04-22 NOTE — PROGRESS NOTES
[address]  Progress Note - Danii Hill 1935, 81 y o  female MRN: [id number]  Unit/Bed#: -01 Encounter: [id number]  Primary Care Provider: Ravindra Pagan MD   Date and time admitted to hospital: 4/20/2022  6:56 AM    Stage 3b chronic kidney disease [facility])  Assessment & Plan  Lab Results   Component Value Date    EGFR 45 04/22/2022    EGFR 33 04/21/2022    EGFR 39 04/20/2022    CREATININE 1 10 04/22/2022    CREATININE 1 41 (H) 04/21/2022    CREATININE 1 24 04/20/2022   Mild elevation in creatinine yesterday, treated with gentle hydration per Nephrology  Creatinine improved on today's assessment  IV fluids discontinued  Continue to monitor  Hyperkalemia  Assessment & Plan  Received calcium gluconate, insulin, dextrose, Kayexalate  Appreciate Nephrology input renal diet IV fluids  Potassium of 4 7 today  Continue to monitor  Influenza  Assessment & Plan  Tamiflu currently afebrile    Paroxysmal atrial fibrillation (HCC)  Assessment & Plan  Continue carvedilol and Eliquis  Anxiety and depression  Assessment & Plan  Continue escitalopram     COPD (chronic obstructive pulmonary disease) ([facility] [address] )  Assessment & Plan  Patient noted for some wheezing on assessment  Has a history of COPD, however she is at her baseline respiratory status  Will continue albuterol p r n , and will add scheduled nebulizer treatment, in addition to IV corticosteroids today, with plan to convert to oral taper tomorrow  Chronic combined systolic and diastolic congestive heart failure (HCC)  Assessment & Plan  Wt Readings from Last 3 Encounters:   04/20/22 60 1 kg (132 lb 7 9 oz)   04/01/22 60 3 kg (133 lb)   02/01/22 58 6 kg (129 lb 3 2 oz)     Improved however patient has low EF and poor quality of life  Continue on Imdur, carvedilol  Lasix was held due to elevated creatinine, which no approximates her baseline  She is her baseline weight  IV fluids per Nephrology  Cardiology consult appreciated, and on review of patient's pacemaker, she is noted for high atrial flutter burden with periodic increases in ventricular rate as well as reduced biventricular pacing; there is a possibility that these findings can be contributing to decompensation in patient's heart failure  Consideration given to initiating Entresto and SGLT2 inhibitor at the time of discharge, as these have been shown to reduce hospitalization rate in individuals who have congestive heart failure  Hypertension  Assessment & Plan  Continue on home medications  * Shortness of breath  Assessment & Plan  Multifactorial likely congestive heart failure, influenza  Reports improvement in symptoms however still short of breath on exertion  Discussed with Cardiology, Nephrology  Given her poor prognosis cardiology recommended palliative care  Patient daughter is agreeable for this  Will consult palliative team   Continue supplemental oxygen as needed  Nebulized treatment  Will also provide IV Solu-Medrol 40 mg q 12 hours today, and convert to oral prednisone with tapering dose from tomorrow  VTE Pharmacologic Prophylaxis:   Pharmacologic: Apixaban (Eliquis)  Mechanical VTE Prophylaxis in Place: Yes    Patient Centered Rounds: I have performed bedside rounds with nursing staff today  Discussions with Specialists or Other Care Team Provider:  Cardiology input appreciated  Nephrology  Education and Discussions with Family / Patient:     Time Spent for Care: 45 minutes  More than 50% of total time spent on counseling and coordination of care as described above  Current Length of Stay: 2 day(s)    Current Patient Status: Inpatient   Certification Statement: The patient will continue to require additional inpatient hospital stay due to Management of KITTY and shortness of breath  Discharge Plan / Estimated Discharge Date:   Will provide scheduled nebulized bronchodilator therapy, in addition to corticosteroids, and reassess respiratory status tomorrow  Will also evaluate for stability in renal function  /24 hours  Code Status: Level 3 - DNAR and DNI      Subjective:   No acute issues reported  Objective:     Vitals:   Temp (24hrs), Av 2 °F (36 2 °C), Min:96 9 °F (36 1 °C), Max:97 6 °F (36 4 °C)    Temp:  [96 9 °F (36 1 °C)-97 6 °F (36 4 °C)] 97 1 °F (36 2 °C)  HR:  [] 103  Resp:  [16-18] 16  BP: (105-150)/(65-81) 150/81  SpO2:  [94 %-98 %] 95 %  Body mass index is 34 45 kg/m²  Input and Output Summary (last 24 hours): Intake/Output Summary (Last 24 hours) at 2022 1626  Last data filed at 2022 0601  Gross per 24 hour   Intake --   Output 700 ml   Net -700 ml       Physical Exam:     Physical Exam  Vitals reviewed  Constitutional:       Appearance: Normal appearance  She is obese  HENT:      Mouth/Throat:      Mouth: Mucous membranes are moist    Eyes:      General:         Right eye: No discharge  Left eye: No discharge  Conjunctiva/sclera: Conjunctivae normal    Cardiovascular:      Rate and Rhythm: Normal rate and regular rhythm  Heart sounds: S1 normal and S2 normal    Pulmonary:      Effort: Pulmonary effort is normal       Breath sounds: Wheezing present  Abdominal:      Palpations: Abdomen is soft  Musculoskeletal:         General: No swelling or tenderness  Cervical back: Neck supple  Skin:     General: Skin is warm and dry  Neurological:      General: No focal deficit present  Mental Status: She is alert     Psychiatric:         Mood and Affect: Mood normal          Behavior: Behavior normal          Additional Data:     Labs:    Results from last 7 days   Lab Units 22  0510   WBC Thousand/uL 4 43   HEMOGLOBIN g/dL 13 0   HEMATOCRIT % 44 4   PLATELETS Thousands/uL 186   NEUTROS PCT % 65   LYMPHS PCT % 20   MONOS PCT % 13*   EOS PCT % 0     Results from last 7 days   Lab Units 22  1003 22  0510 04/20/22  0712   POTASSIUM mmol/L 4 7   < > 5 4*   CHLORIDE mmol/L 100   < > 98   CO2 mmol/L 31   < > 29   BUN mg/dL 30*   < > 39*   CREATININE mg/dL 1 10   < > 1 24   CALCIUM mg/dL 8 8   < > 9 5   ALK PHOS U/L  --   --  68   ALT U/L  --   --  14   AST U/L  --   --  17    < > = values in this interval not displayed  Results from last 7 days   Lab Units 04/20/22  0712   INR  1 07       * I Have Reviewed All Lab Data Listed Above  * Additional Pertinent Lab Tests Reviewed: All Labs Within Last 24 Hours Reviewed    Imaging:    Imaging Reports Reviewed Today Include:  None  Imaging Personally Reviewed by Myself Includes:  None  Recent Cultures (last 7 days):     Results from last 7 days   Lab Units 04/20/22  0800 04/20/22  6198   BLOOD CULTURE  No Growth at 48 hrs  No Growth at 48 hrs         Last 24 Hours Medication List:   Current Facility-Administered Medications   Medication Dose Route Frequency Provider Last Rate    acetaminophen  650 mg Oral Q6H PRN Justyna Cuellar MD      albuterol  2 puff Inhalation Q6H PRN Justyna Cuellar MD      apixaban  2 5 mg Oral BID Justyna Cuellar MD      vitamin B-12  1,000 mcg Oral Early Morning Justyna Cuellar MD      escitalopram  10 mg Oral Daily Justyna Cuellar MD      ipratropium  0 5 mg Nebulization TID Barb Vega MD      isosorbide mononitrate  30 mg Oral Daily Justyna Cuellar MD      levalbuterol  0 63 mg Nebulization TID Barb Vega MD      methylPREDNISolone sodium succinate  40 mg Intravenous Q12H [address] Barb Vega MD      metoprolol succinate  50 mg Oral Daily Fifi Wade MD      nystatin   Topical BID Barb Vega MD      ondansetron  4 mg Intravenous Q6H PRN Justyna Cuellar MD      oseltamivir  30 mg Oral Q24H Justyna Cuellar MD      sodium chloride (PF)  3 mL Intravenous Q1H PRN Adiel Clements DO          Today, Patient Was Seen By: Barb Vega MD    ** Please Note: Dragon 360 Dictation voice to text software may have been used in the creation of this document   **

## 2022-04-22 NOTE — PROGRESS NOTES
Cardiology Progress Note - Beth Bradford 81 y o  female MRN: [id number]    Unit/Bed#: -01 Encounter: [id number]        Hospital Problems:  Principal Problem:    Shortness of breath  Active Problems:    Hypertension    Chronic combined systolic and diastolic congestive heart failure (HCC)    COPD (chronic obstructive pulmonary disease) (HCC)    Anxiety and depression    Paroxysmal atrial fibrillation (HCC)    Influenza    Hyperkalemia    Stage 3b chronic kidney disease (HCC)      Assessment/Recommendations:    Chronic systolic and diastolic heart failure:  Received IV fluids overnight for dehydration  Labs are pending  Continues report dyspnea probably a combination of deconditioning, cardiomyopathy and underlying influenza illness  Hopefully resume diuretics once renal function back to baseline  Next step also will be to add low-dose of Entresto if blood pressure and renal function allow  Long-term she will benefit from the addition of SGLT-2 inhibitors which have been shown to reduce risk of heart failure hospitalization  Palliative Care consultation reviewed  Start lasix 20mg QOD from tomorrow  Hold off spironolactone  Longstanding cardiomyopathy status post AICD:  Device interrogation revealed atrial flutter with periods of elevated ventricular rate and relatively low biventricular pacing  Discussed with daughter options of cardioversion and ablation  She would prefer medical management  Switched to toprol XL  Paroxysmal atrial flutter:  High burden of atrial flutter on recent device check  On low-dose Eliquis to lower stroke risk  Discussed rhythm control strategies with patient's daughter to achieve maximal benefit of biventricular pacing  For now discontinue Coreg, given her wheezing  Switch to Toprol-XL 50 mg a day which is more beta 1 selective and will give better rate control  Acute kidney injury:  Receive IV fluids overnight  Creat improved    Discussed with Nephrology  Fluids stopped now  COPD with severe deconditioning and severe pulmonary hypertension:  She does have a great deal of inspiratory and expiratory wheezing today  Recommend checking a chest x-ray and given bronchodilators  This may also be contributing to her dyspnea  Device interrogation tracings personally reviewed:  Atrial flutter noted  Average ventricular rate more than 100 during atrial flutter  CRT pacing effective less than 90% of the time  Possible OptiVol volume accumulation in December 2021  Nightly heart rate over 85 for the last 7 days  Patient activity less than 1 hour a day for the last 38 weeks  Ventricular sensing episodes average about 3 4 hours a day      Subjective:     Patient seen and examined  Overnight events reviewed  Was resting comfortably this morning  Had a good night  Less anxious  States that her breathing is better  She still has some cough  No further nausea  No chest pain reported  Labs are pending  Discussed plan with daughter over the phone  ECHO: 12/2021:  Left Ventricle: Left ventricular cavity size is mildly to moderately dilated  The left ventricular ejection fraction is 30%  Systolic function is severely reduced  There is severe global hypokinesis with regional variation  There is eccentric hypertrophy    IVS: There is abnormal septal motion consistent with right ventricular pacing    Left Atrium: The atrium is mildly to moderately dilated    Aortic Valve: The aortic valve is trileaflet  The leaflets are moderately thickened  The leaflets are moderately calcified  The leaflets exhibit normal mobility  The valve appears sclerotic    Mitral Valve: There is annular calcification  There is mild to moderate regurgitation    Tricuspid Valve: There is moderate regurgitation  The right ventricular systolic pressure is severely elevated  The estimated right ventricular systolic pressure is 78 7 mmHg      Pulmonary Artery: The pulmonary artery systolic pressure is severely increased      EK/22: Atrial flutter, variable block  Rate 116 BPM   LBBB  Review of Systems   Constitutional: Positive for fatigue  HENT: Negative  Eyes: Negative  Respiratory: Positive for cough, shortness of breath and wheezing  Cardiovascular: Negative for chest pain and palpitations  Gastrointestinal: Negative  Endocrine: Negative  Genitourinary: Negative  Musculoskeletal: Positive for arthralgias and gait problem  Skin: Negative  Allergic/Immunologic: Negative  Neurological: Positive for weakness  Hematological: Does not bruise/bleed easily  Psychiatric/Behavioral: Positive for sleep disturbance  Review of ten system was conducted and was negative except for findings stated          Current Facility-Administered Medications:     acetaminophen (TYLENOL) tablet 650 mg, 650 mg, Oral, Q6H PRN, Leni Sicard, MD, 650 mg at 22 1430    albuterol (PROVENTIL HFA,VENTOLIN HFA) inhaler 2 puff, 2 puff, Inhalation, Q6H PRN, Leni Sicard, MD    apixaban (ELIQUIS) tablet 2 5 mg, 2 5 mg, Oral, BID, Leni Sicard, MD, 2 5 mg at 22 1724    carvedilol (COREG) tablet 6 25 mg, 6 25 mg, Oral, BID With Meals, Leni Sicard, MD, 6 25 mg at 22 1723    cyanocobalamin (VITAMIN B-12) tablet 1,000 mcg, 1,000 mcg, Oral, Early Morning, Leni Sicard, MD, 1,000 mcg at 22 0646    escitalopram (LEXAPRO) tablet 10 mg, 10 mg, Oral, Daily, Leni Sicard, MD, 10 mg at 22 0914    isosorbide mononitrate (IMDUR) 24 hr tablet 30 mg, 30 mg, Oral, Daily, Leni Sicard, MD, 30 mg at 22 0914    nystatin (MYCOSTATIN) powder, , Topical, BID, Silva Castanon MD, Given at 22 1724    ondansetron (ZOFRAN) injection 4 mg, 4 mg, Intravenous, Q6H PRN, Leni Sicard, MD    oseltamivir (TAMIFLU) capsule 30 mg, 30 mg, Oral, Q24H, Leni Sicard, MD, 30 mg at 22 0914    Insert peripheral IV, , , Once **AND** sodium chloride (PF) 0 9 % injection 3 mL, 3 mL, Intravenous, Q1H PRN, Medardo Reyes,     sodium chloride 0 9 % infusion, 50 mL/hr, Intravenous, Continuous, Julianna Roman MD, Last Rate: 50 mL/hr at 04/21/22 1203, 50 mL/hr at 04/21/22 1203     Objective:     Vitals:   Blood pressure 127/72, pulse 88, temperature (!) 97 4 °F (36 3 °C), temperature source Tympanic, resp  rate 16, height 4' 4" (1 321 m), weight 60 1 kg (132 lb 7 9 oz), SpO2 96 %  Body mass index is 34 45 kg/m²  Orthostatic Blood Pressures      Most Recent Value   Blood Pressure 127/72 filed at 04/22/2022 0746   Patient Position - Orthostatic VS Lying filed at 04/22/2022 9103         Systolic (28MJG), QQX:803 , Min:105 , ZLI:215     Diastolic (13WVH), GAX:97, Min:66, Max:74      Intake/Output Summary (Last 24 hours) at 4/22/2022 0754  Last data filed at 4/22/2022 0601  Gross per 24 hour   Intake --   Output 800 ml   Net -800 ml     Weight (last 2 days)     Date/Time Weight    04/20/22 1026 60 1 (132 5)            Physical Exam  Vitals reviewed  Constitutional:       General: She is not in acute distress  HENT:      Head: Normocephalic  Right Ear: External ear normal       Left Ear: External ear normal    Eyes:      General: No scleral icterus  Neck:      Vascular: No carotid bruit  Cardiovascular:      Rate and Rhythm: Normal rate and regular rhythm  Heart sounds: S1 normal and S2 normal  Murmur heard  Crescendo systolic murmur is present with a grade of 2/6  Pulmonary:      Effort: Prolonged expiration present  Breath sounds: Decreased air movement present  Decreased breath sounds, wheezing and rhonchi present  Chest:      Chest wall: No tenderness  Abdominal:      General: There is no distension  Tenderness: There is no guarding  Musculoskeletal:      Right lower leg: No edema  Left lower leg: No edema  Skin:     General: Skin is warm  Findings: No lesion  Neurological:      General: No focal deficit present  Mental Status: She is alert  Psychiatric:         Mood and Affect: Mood normal            Labs & Results:    Results from last 7 days   Lab Units 04/21/22  0510   CK TOTAL U/L 26     Results from last 7 days   Lab Units 04/21/22  0510 04/20/22  0712   WBC Thousand/uL 4 43 11 01*   HEMOGLOBIN g/dL 13 0 15 2   HEMATOCRIT % 44 4 49 5*   PLATELETS Thousands/uL 186 246         Results from last 7 days   Lab Units 04/21/22  1305 04/21/22  0510 04/20/22  0712   POTASSIUM mmol/L 4 9 6 0* 5 4*   CHLORIDE mmol/L  --  101 98   CO2 mmol/L  --  27 29   BUN mg/dL  --  44* 39*   CREATININE mg/dL  --  1 41* 1 24   CALCIUM mg/dL  --  8 8 9 5   ALK PHOS U/L  --   --  68   ALT U/L  --   --  14   AST U/L  --   --  17     Results from last 7 days   Lab Units 04/20/22  0712   INR  1 07   PTT seconds 26         No results for input(s): NTBNP in the last 72 hours  Imaging Studies:     X-ray chest 1 view portable    Result Date: 4/20/2022  Narrative: CHEST INDICATION:   chest pain  Extreme shortness of breath  COMPARISON:  Chest radiograph from 12/17/2021 and chest CT from 12/18/2021  EXAM PERFORMED/VIEWS:  XR CHEST PORTABLE FINDINGS: Mild cardiomegaly with left subclavian ICD leads in the right atrium and 2 leads in the right ventricle  The lungs are clear  No pneumothorax or pleural effusion  Osseous structures appear within normal limits for patient age  Impression: No acute cardiopulmonary disease  Workstation performed: [id number]     XR knee 3 vw left non injury    Result Date: 4/18/2022  Narrative: LEFT KNEE INDICATION:   M25 562: Pain in left knee G89 29: Other chronic pain  COMPARISON:  None VIEWS:  XR KNEE 3 VW LEFT NON INJURY FINDINGS: There is no acute fracture or dislocation  There is no joint effusion  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  No lytic or blastic osseous lesion  Soft tissues are unremarkable       Impression: 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  Workstation performed: [id number]     CT chest without contrast    Result Date: 4/20/2022  Narrative: CT CHEST WITHOUT IV CONTRAST INDICATION:   eval for pneumonia/ fluid overload  COMPARISON:  CTA chest 12/18/2021; chest x-ray 4/20/2022 TECHNIQUE: CT examination of the chest was performed without intravenous contrast   Axial, sagittal, and coronal 2D reformatted images were created from the source data and submitted for interpretation  Radiation dose length product (DLP) for this visit:  185 mGy-cm   This examination, like all CT scans performed in the [facility], was performed utilizing techniques to minimize radiation dose exposure, including the use of iterative reconstruction and automated exposure control  FINDINGS: LUNGS:  A few small stable right upper and lower lobe nodules are again noted without change  Small calcified granuloma in the middle lobe again identified  Mild scar and/or subsegmental atelectasis in both lung bases is noted  Lungs are otherwise clear  Central airways are patent PLEURA:  Unremarkable  HEART/GREAT VESSELS: Heart is mildly enlarged  AICD leads are present  No thoracic aortic aneurysm  MEDIASTINUM AND FRANKIE:  Unremarkable  CHEST WALL AND LOWER NECK:  Implantable AICD again noted left chest wall  VISUALIZED STRUCTURES IN THE UPPER ABDOMEN:  Unremarkable  OSSEOUS STRUCTURES:  No acute fracture or destructive osseous lesion  Impression: 1  Few small stable scattered 2 mm pulmonary nodules again noted  Based on current Fleischner Society 2017 Guidelines on incidental pulmonary nodule, no routine follow-up is needed if the patient is low risk  If the patient is high risk, optional follow-up chest CT at 12 months can be considered  2   Additional findings as noted   Workstation performed: [id number]           Available cardiology studies, imaging and lab results independently reviewed today       This note was completed in part utilizing m-modal fluency direct voice recognition software  Grammatical errors, random word insertion, spelling mistakes, occasional wrong word or "sound-alike" substitutions and incomplete sentences may be an occasional consequence of the system secondary to software limitations, ambient noise and hardware issues  At the time of dictation, efforts were made to edit, clarify and /or correct errors  Please read the chart carefully and recognize, using context, where substitutions have occurred  If you have any questions or concerns about the context, text or information contained within the body of this dictation, please contact myself, the provider, for further clarification

## 2022-04-22 NOTE — ASSESSMENT & PLAN NOTE
Lab Results   Component Value Date    EGFR 45 04/22/2022    EGFR 33 04/21/2022    EGFR 39 04/20/2022    CREATININE 1 10 04/22/2022    CREATININE 1 41 (H) 04/21/2022    CREATININE 1 24 04/20/2022   Mild elevation in creatinine yesterday, treated with gentle hydration per Nephrology  Creatinine improved on today's assessment  IV fluids discontinued  Continue to monitor

## 2022-04-22 NOTE — PLAN OF CARE
Problem: PHYSICAL THERAPY ADULT  Goal: Performs mobility at highest level of function for planned discharge setting  See evaluation for individualized goals  Description: Treatment/Interventions: Functional transfer training,LE strengthening/ROM,Elevations,Therapeutic exercise,Endurance training,Gait training,Spoke to nursing,Spoke to case management     See flowsheet documentation for full assessment, interventions and recommendations  Outcome: Progressing  Note: Prognosis: Good  Problem List: Decreased strength,Decreased endurance,Impaired balance,Decreased mobility,Decreased safety awareness  Assessment: pt shows improvement in mobility status w/ increased ambulation tolerance  rest breaks were needed with activity due to fatigue and dyspnea  pt remains at risk for falling due to physical impairments  continued inpatient PT is needed to improve endurance  home PT is recommended to facilitate eventual safe return home  Barriers to Discharge: Inaccessible home environment        PT Discharge Recommendation: Home with home health rehabilitation          See flowsheet documentation for full assessment

## 2022-04-22 NOTE — ASSESSMENT & PLAN NOTE
Wt Readings from Last 3 Encounters:   04/20/22 60 1 kg (132 lb 7 9 oz)   04/01/22 60 3 kg (133 lb)   02/01/22 58 6 kg (129 lb 3 2 oz)     Improved however patient has low EF and poor quality of life  Continue on Imdur, carvedilol  Lasix was held due to elevated creatinine, which no approximates her baseline  She is her baseline weight  IV fluids per Nephrology  Cardiology consult appreciated, and on review of patient's pacemaker, she is noted for high atrial flutter burden with periodic increases in ventricular rate as well as reduced biventricular pacing; there is a possibility that these findings can be contributing to decompensation in patient's heart failure  Consideration given to initiating Entresto and SGLT2 inhibitor at the time of discharge, as these have been shown to reduce hospitalization rate in individuals who have congestive heart failure

## 2022-04-22 NOTE — PLAN OF CARE
Problem: PAIN - ADULT  Goal: Verbalizes/displays adequate comfort level or baseline comfort level  Description: Interventions:  - Encourage patient to monitor pain and request assistance  - Assess pain using appropriate pain scale  - Administer analgesics based on type and severity of pain and evaluate response  - Implement non-pharmacological measures as appropriate and evaluate response  - Consider cultural and social influences on pain and pain management  - Notify physician/advanced practitioner if interventions unsuccessful or patient reports new pain  Outcome: Progressing     Problem: SAFETY ADULT  Goal: Patient will remain free of falls  Description: INTERVENTIONS:  - Educate patient/family on patient safety including physical limitations  - Instruct patient to call for assistance with activity   - Consult OT/PT to assist with strengthening/mobility   - Keep Call bell within reach  - Keep bed low and locked with side rails adjusted as appropriate  - Keep care items and personal belongings within reach  - Initiate and maintain comfort rounds  - Make Fall Risk Sign visible to staff  - Offer Toileting every  2 Hours, in advance of need  - Initiate/Maintain bed and chair alarm  - Apply yellow socks and bracelet for high fall risk patients  - Consider moving patient to room near nurses station  Outcome: Progressing  Goal: Maintain or return to baseline ADL function  Description: INTERVENTIONS:  -  Assess patient's ability to carry out ADLs; assess patient's baseline for ADL function and identify physical deficits which impact ability to perform ADLs (bathing, care of mouth/teeth, toileting, grooming, dressing, etc )  - Assess/evaluate cause of self-care deficits   - Assess range of motion  - Assess patient's mobility; develop plan if impaired  - Assess patient's need for assistive devices and provide as appropriate  - Encourage maximum independence but intervene and supervise when necessary  - Involve family in performance of ADLs  - Assess for home care needs following discharge   - Consider OT consult to assist with ADL evaluation and planning for discharge  - Provide patient education as appropriate  Outcome: Progressing  Goal: Maintains/Returns to pre admission functional level  Description: INTERVENTIONS:  - Perform BMAT or MOVE assessment daily    - Set and communicate daily mobility goal to care team and patient/family/caregiver  - Collaborate with rehabilitation services on mobility goals if consulted  - Perform Range of Motion 3  times a day  - Stand patient 3 times a day  - Ambulate patient 3 times a day  - Out of bed to chair  3  times a day   - Out of bed for meals  3  times a day  - Out of bed for toileting  - Record patient progress and toleration of activity level   Outcome: Progressing     Problem: DISCHARGE PLANNING  Goal: Discharge to home or other facility with appropriate resources  Description: INTERVENTIONS:  - Identify barriers to discharge w/patient and caregiver  - Arrange for needed discharge resources and transportation as appropriate  - Identify discharge learning needs (meds, wound care, etc )  - Arrange for interpretive services to assist at discharge as needed  - Refer to Case Management Department for coordinating discharge planning if the patient needs post-hospital services based on physician/advanced practitioner order or complex needs related to functional status, cognitive ability, or social support system  Outcome: Progressing     Problem: Knowledge Deficit  Goal: Patient/family/caregiver demonstrates understanding of disease process, treatment plan, medications, and discharge instructions  Description: Complete learning assessment and assess knowledge base    Interventions:  - Provide teaching at level of understanding  - Provide teaching via preferred learning methods  Outcome: Progressing     Problem: Potential for Falls  Goal: Patient will remain free of falls  Description: INTERVENTIONS:  - Educate patient/family on patient safety including physical limitations  - Instruct patient to call for assistance with activity   - Consult OT/PT to assist with strengthening/mobility   - Keep Call bell within reach  - Keep bed low and locked with side rails adjusted as appropriate  - Keep care items and personal belongings within reach  - Initiate and maintain comfort rounds  - Make Fall Risk Sign visible to staff  - Offer Toileting every  2 Hours, in advance of need  - Initiate/Maintain bed and chair alarm  - Apply yellow socks and bracelet for high fall risk patients  - Consider moving patient to room near nurses station  Outcome: Progressing     Problem: MOBILITY - ADULT  Goal: Maintain or return to baseline ADL function  Description: INTERVENTIONS:  -  Assess patient's ability to carry out ADLs; assess patient's baseline for ADL function and identify physical deficits which impact ability to perform ADLs (bathing, care of mouth/teeth, toileting, grooming, dressing, etc )  - Assess/evaluate cause of self-care deficits   - Assess range of motion  - Assess patient's mobility; develop plan if impaired  - Assess patient's need for assistive devices and provide as appropriate  - Encourage maximum independence but intervene and supervise when necessary  - Involve family in performance of ADLs  - Assess for home care needs following discharge   - Consider OT consult to assist with ADL evaluation and planning for discharge  - Provide patient education as appropriate  Outcome: Progressing  Goal: Maintains/Returns to pre admission functional level  Description: INTERVENTIONS:  - Perform BMAT or MOVE assessment daily    - Set and communicate daily mobility goal to care team and patient/family/caregiver  - Collaborate with rehabilitation services on mobility goals if consulted  - Perform Range of Motion 3 times a day    - Stand patient 3 times a day  - Ambulate patient 3 times a day  - Out of bed to chair 3 times a day   - Out of bed for meals  3 times a day  - Out of bed for toileting  - Record patient progress and toleration of activity level   Outcome: Progressing     Problem: Prexisting or High Potential for Compromised Skin Integrity  Goal: Skin integrity is maintained or improved  Description: INTERVENTIONS:  - Identify patients at risk for skin breakdown  - Assess and monitor skin integrity  - Assess and monitor nutrition and hydration status  - Monitor labs   - Assess for incontinence   - Turn and reposition patient  - Assist with mobility/ambulation  - Relieve pressure over bony prominences  - Avoid friction and shearing  - Provide appropriate hygiene as needed including keeping skin clean and dry  - Evaluate need for skin moisturizer/barrier cream  - Collaborate with interdisciplinary team   - Patient/family teaching  Outcome: Progressing     Problem: CARDIOVASCULAR - ADULT  Goal: Maintains optimal cardiac output and hemodynamic stability  Description: INTERVENTIONS:  - Monitor I/O, vital signs and rhythm  - Monitor for S/S and trends of decreased cardiac output  - Administer and titrate ordered vasoactive medications to optimize hemodynamic stability  - Assess quality of pulses, skin color and temperature  - Assess for signs of decreased coronary artery perfusion  - Instruct patient to report change in severity of symptoms  Outcome: Progressing  Goal: Absence of cardiac dysrhythmias or at baseline rhythm  Description: INTERVENTIONS:  - Continuous cardiac monitoring, vital signs, obtain 12 lead EKG if ordered  - Administer antiarrhythmic and heart rate control medications as ordered  - Monitor electrolytes and administer replacement therapy as ordered  Outcome: Progressing

## 2022-04-22 NOTE — PROGRESS NOTES
NEPHROLOGY PROGRESS NOTE   Chelsie Bustillo 81 y o  female MRN: [id number]  Unit/Bed#: -01 Encounter: [id number]     Addendum:  Labs reviewed  Renal function improving to creatinine 1 1 and potassium is at normal range at 4 7  Continue same treatment  ASSESSMENT & PLAN:  30-year-old female presented with complain of nausea vomiting and dry cough plus shortness of breath for 1 day  Outpatient labs suggestive of elevated creatinine recently and dose of Lasix was decreased to every other day  During hospital stay patient developed worsening hyperkalemia and elevated creatinine so nephrology consult    Hyperkalemia, POA  -admission potassium was 5 4 and has trended up further to 6 0 on 04/21 when nephrology was consulted  -hyperkalemia likely due to combination of volume depletion plus being on spironolactone at home and from dietary indiscretion   -she received normal saline bolus followed by normal saline at 50 mL/hour on 04/21 and received medical treatment for hyperkalemia, repeat potassium level was 4 9 on 04/21, blood work from today a m  Is still not collected  Repeat ordered place  Will follow up the labs once collected  Continue low-potassium diet  -CK level was at normal range  -continue to hold spironolactone  -follow-up BMP from today  If potassium level still elevated would repeat medical treatment     Elevated serum creatinine  -baseline creatinine from December 2021 was around 0 9-1 1 but has been elevated to 1 33 on 04/10  -admission creatinine on 04/20 was 1 24  -creatinine has trended up to 1 4 on 04/21  -UA with 4-10 RBC per high-power field, 0-1 wbc's, and 2+ blood  CK level at normal range  Bladder scan was negative  -likely from volume depletion in the setting of decreased oral intake and being on diuretics at home  -follow-up BMP to monitor renal function    Stopping further IV fluid as patient with significant wheezing likely due to COPD     Shortness of breaths suspected secondary to influenza  CT chest as well as chest x-ray not suggestive of fluid overload  Patient taken off Coreg by Cardiology on 04/22 due to wheezing and was switched to Toprol XL     Chronic systolic and diastolic CHF:  Previous echocardiogram from December 2021 showed ejection fraction 30% with severe pulmonary hypertension with right ventricular systolic pressure 71 mm Hg   -status post IV hydration on 04/21, holding off on further IV fluids  Continue to hold diuretics for now but okay to resume at Lasix 20 mg every other day once renal function back to baseline      Influenza management per primary team     Others paroxysmal AFib/anxiety/depression/COPD:  Management per primary team     Discussed with Cardiology and with primary team    SUBJECTIVE:  No new complaints  No chest pain or shortness of breath, does complain of some cough  No chest pain  Daughter was at bedside and helped with interpretation in [facility] [place]  OBJECTIVE:  Current Weight: Weight - Scale: 60 1 kg (132 lb 7 9 oz)  Vitals:    04/22/22 0746   BP: 127/72   Pulse: 88   Resp: 16   Temp: (!) 97 4 °F (36 3 °C)   SpO2: 96%       Intake/Output Summary (Last 24 hours) at 4/22/2022 0856  Last data filed at 4/22/2022 0601  Gross per 24 hour   Intake --   Output 800 ml   Net -800 ml       Physical Exam  General:  Ill looking, awake  Eyes: Conjunctivae pink,  Sclera anicteric  ENT: lips and mucous membranes moist  Neck: supple   Chest:  Bilateral lungs wheezing  CVS: S1 & S2 present, normal rate, regular rhythm, no murmur    Abdomen: soft, non-tender, non-distended, Bowel sounds normoactive  Extremities: no edema of  legs  Skin: no rash  Neuro: awake, alert, oriented x 3   Psych: Mood and affect appropriate     Medications:    Current Facility-Administered Medications:     acetaminophen (TYLENOL) tablet 650 mg, 650 mg, Oral, Q6H PRN, Sudheer Juarez MD, 650 mg at 04/20/22 1430    albuterol (PROVENTIL HFA,VENTOLIN HFA) inhaler 2 puff, 2 puff, Inhalation, Q6H PRN, Reginaldo Bey MD    apixaban (ELIQUIS) tablet 2 5 mg, 2 5 mg, Oral, BID, Reginaldo Bey MD, 2 5 mg at 04/22/22 3577    cyanocobalamin (VITAMIN B-12) tablet 1,000 mcg, 1,000 mcg, Oral, Early Morning, Reginaldo Bey MD, 1,000 mcg at 04/22/22 0646    escitalopram (LEXAPRO) tablet 10 mg, 10 mg, Oral, Daily, Reginaldo Bey MD, 10 mg at 04/22/22 0824    isosorbide mononitrate (IMDUR) 24 hr tablet 30 mg, 30 mg, Oral, Daily, Reginaldo Bey MD, 30 mg at 04/22/22 1549    metoprolol succinate (TOPROL-XL) 24 hr tablet 50 mg, 50 mg, Oral, Daily, Derik Reed MD, 50 mg at 04/22/22 7447    nystatin (MYCOSTATIN) powder, , Topical, BID, Troy Rob MD, Given at 04/22/22 0826    ondansetron (ZOFRAN) injection 4 mg, 4 mg, Intravenous, Q6H PRN, Reginaldo Bey MD    oseltamivir (TAMIFLU) capsule 30 mg, 30 mg, Oral, Q24H, Reginaldo Bey MD, 30 mg at 04/22/22 0823    Insert peripheral IV, , , Once **AND** sodium chloride (PF) 0 9 % injection 3 mL, 3 mL, Intravenous, Q1H PRN, Laila Galo DO    Invasive Devices:        Lab Results:   Results from last 7 days   Lab Units 04/21/22  1305 04/21/22  0510 04/20/22  0712   WBC Thousand/uL  --  4 43 11 01*   HEMOGLOBIN g/dL  --  13 0 15 2   HEMATOCRIT %  --  44 4 49 5*   PLATELETS Thousands/uL  --  186 246   POTASSIUM mmol/L 4 9 6 0* 5 4*   CHLORIDE mmol/L  --  101 98   CO2 mmol/L  --  27 29   BUN mg/dL  --  44* 39*   CREATININE mg/dL  --  1 41* 1 24   CALCIUM mg/dL  --  8 8 9 5   ALK PHOS U/L  --   --  68   ALT U/L  --   --  14   AST U/L  --   --  17       Previous work up:      Portions of the record may have been created with voice recognition software  Occasional wrong word or "sound a like" substitutions may have occurred due to the inherent limitations of voice recognition software  Read the chart carefully and recognize, using context, where substitutions have occurred  If you have any questions, please contact the dictating provider

## 2022-04-22 NOTE — RESPIRATORY THERAPY NOTE
RT Protocol Note  Stephon Alcantar 81 y o  female MRN: [id number]  Unit/Bed#: -01 Encounter: [id number]    Assessment    Principal Problem:    Shortness of breath  Active Problems:    Hypertension    Chronic combined systolic and diastolic congestive heart failure (HCC)    COPD (chronic obstructive pulmonary disease) ([facility])    Anxiety and depression    Paroxysmal atrial fibrillation ([facility])    Influenza    Hyperkalemia    Stage 3b chronic kidney disease ([facility] [address] )      Home Pulmonary Medications:  Albuterol inhaler prn       Past Medical History:   Diagnosis Date    Anxiety and depression 2022    Atrial flutter ([facility] ) 2022    BMI 27 0-27 9,adult 2021    CHF (congestive heart failure) ([facility])     Congestive heart failure (CHF) ([facility] ) 2021    COPD (chronic obstructive pulmonary disease) ([facility])     COPD (chronic obstructive pulmonary disease) ([place] [address] ) 2021    Diabetes 1 5, managed as type 2 ([place] [address] )     Fatigue 2022    Gastroesophageal reflux disease without esophagitis 2021    High blood pressure     Hyperglycemia 2022    Hypertension 2021    Hypertensive heart disease with congestive heart failure ([place] [address] ) 2021    Left knee pain 2022    Obstructive sleep apnea syndrome 2021    Other specified anemias 2022    Paroxysmal atrial fibrillation ([facility] ) 2022    Status post implantation of automatic cardioverter/defibrillator (AICD) 2021    Vitamin B12 deficiency 2021    Vitamin D deficiency 2021     Social History     Socioeconomic History    Marital status:      Spouse name: None    Number of children: None    Years of education: None    Highest education level: None   Occupational History    None   Tobacco Use    Smoking status: Former Smoker     Packs/day: 2 00     Years: 50 00     Pack years: 100 00     Types: Cigarettes     Quit date:      Years since quittin 3    Smokeless tobacco: Never Used Vaping Use    Vaping Use: Never used   Substance and Sexual Activity    Alcohol use: Never    Drug use: Never    Sexual activity: Not Currently   Other Topics Concern    None   Social History Narrative    None     Social Determinants of Health     Financial Resource Strain: Not on file   Food Insecurity: No Food Insecurity    Worried About Running Out of Food in the Last Year: Never true    [place] of Food in the Last Year: Never true   Transportation Needs: No Transportation Needs    Lack of Transportation (Medical): No    Lack of Transportation (Non-Medical): No   Physical Activity: Not on file   Stress: Not on file   Social Connections: Not on file   Intimate Partner Violence: Not on file   Housing Stability: Low Risk     Unable to Pay for Housing in the Last Year: No    Number of Places Lived in the Last Year: 1    Unstable Housing in the Last Year: No       Subjective         Objective    Physical Exam:   Assessment Type: During-treatment  Respiratory Pattern: Normal  Chest Assessment: Chest expansion symmetrical  Bilateral Breath Sounds: Clear,Diminished    Vitals:  Blood pressure 127/72, pulse 88, temperature (!) 97 4 °F (36 3 °C), temperature source Tympanic, resp  rate 16, height 4' 4" (1 321 m), weight 60 1 kg (132 lb 7 9 oz), SpO2 98 %  Imaging and other studies: I have personally reviewed pertinent reports  Plan    Respiratory Plan: Home Bronchodilator Patient pathway        Resp Comments: Assessed pt per protocol  Pt has a COPD history  Breath sounds were clear and diminished  Pt only has a home Albuterol inhaler  Pt is not in any respiratory distress  SPO2 is 98% on RA  Treatments was ordered by provider  Will keep pt on breathing treatments for now  Respiratory to follow

## 2022-04-22 NOTE — PHYSICAL THERAPY NOTE
PHYSICAL THERAPY TREATMENT NOTE    Patient Name: David Noe  KGOUI'B Date: 4/22/2022 04/22/22 1034   PT Last Visit   PT Visit Date 04/22/22   Pain Assessment   Pain Assessment Tool 0-10   Pain Score No Pain   Restrictions/Precautions   Other Precautions Contact/isolation;Droplet precautions; Fall Risk;Telemetry   General   Chart Reviewed Yes   Family/Caregiver Present Yes   Cognition   Arousal/Participation Alert   Attention Attends with cues to redirect   Orientation Level Oriented to person; Other (Comment)  (pt was identified w/ full name, birth date)   Following Commands Follows one step commands with increased time or repetition   Subjective   Subjective pt seen sitting in chair w/ daughter present  pt agreed to participate in PT session  denied pain or dizziness  states feeling tired  pt speaks [place] - daughter was present to assist w/ translation  Transfers   Sit to Stand 6  Modified independent   Additional items Increased time required   Stand to Sit 6  Modified independent   Additional items Increased time required   Ambulation/Elevation   Gait pattern Foward flexed   Gait Assistance 5  Supervision   Additional items Verbal cues  (for full step length)   Assistive Device None   Distance 40 feet  seated rest break  80 feet    (additional not possible due to fatigue)   Stair Management Assistance Not tested   Balance   Static Sitting Good   Static Standing Fair +   Ambulatory Fair -   Activity Tolerance   Activity Tolerance Patient limited by fatigue   Nurse Made Aware spoke to [facility]   Equipment Use   Comments ankle pumps 30  quad sets 20  heel slides and hip abduction 10 each  Assessment   Prognosis Good   Problem List Decreased strength;Decreased endurance; Impaired balance;Decreased mobility; Decreased safety awareness   Assessment pt shows improvement in mobility status w/ increased ambulation tolerance  rest breaks were needed with activity due to fatigue and dyspnea  pt remains at risk for falling due to physical impairments  continued inpatient PT is needed to improve endurance  home PT is recommended to facilitate eventual safe return home  Goals   Patient Goals go home   STG Expiration Date 05/01/22   Short Term Goal #1 Patient PT goals established in order to address patient self reported goal of increase her strength and endurance in order to negotiate the stairs  Pt will: complete all transfers independently in order to increase safety with functional mobility; ambulate >80ft with LRAD at janey level in order to increase safety with household distance functional mobility; negotiate 8-10 stairs with HR assist and S in order to facilitate ability to negotiate 16 stairs into daughter's apartment; demonstrate understanding and independence with LE strengthening HEP; improve ambulatory balance to >/= good grade in order to promote safety and increased independence with mobility; tolerate >3hrs OOB in upright position, in order to improve muscular endurance and respiratory status; improve AM-PAC score to >/= 24/24 in order to increase independence with mobility and decrease burden of care; improve Barthel Index score to >/= 65/100 in order to increase independence and decrease risk of falls  PT Treatment Day 1   Plan   Treatment/Interventions Functional transfer training;LE strengthening/ROM; Elevations; Therapeutic exercise; Endurance training;Patient/family training;Bed mobility;Gait training   Progress Progressing toward goals   PT Frequency 3-5x/wk   Recommendation   PT Discharge Recommendation Home with home health rehabilitation   AM-PAC Basic Mobility Inpatient   Turning in Bed Without Bedrails 4   Lying on Back to Sitting on Edge of Flat Bed 4   Moving Bed to Chair 3   Standing Up From Chair 3   Walk in Room 3   Climb 3-5 Stairs 3   Basic Mobility Inpatient Raw Score 20   Basic Mobility Standardized Score 43 99   Highest Level Of Mobility   -HLM Goal 6: Walk 10 steps or more   JH-HLM Highest Level of Mobility 7: Walk 25 feet or more   JH-HLM Goal Achieved Yes   End of Consult   Patient Position at End of Consult Bedside chair; All needs within reach     The patient's AM-PAC Basic Mobility Inpatient Short Form Raw Score is 20  A Raw score of greater than 16 suggests the patient may benefit from discharge to home  Please also refer to the recommendation of the Physical Therapist for safe discharge planning  Skilled inpatient PT recommended while in hospital to progress pt toward treatment goals      Ravindra Bond, PT

## 2022-04-22 NOTE — ASSESSMENT & PLAN NOTE
Received calcium gluconate, insulin, dextrose, Kayexalate  Appreciate Nephrology input renal diet IV fluids  Potassium of 4 7 today  Continue to monitor

## 2022-04-22 NOTE — ASSESSMENT & PLAN NOTE
Patient noted for some wheezing on assessment  Has a history of COPD, however she is at her baseline respiratory status  Will continue albuterol p r n , and will add scheduled nebulizer treatment, in addition to IV corticosteroids today, with plan to convert to oral taper tomorrow

## 2022-04-23 NOTE — ASSESSMENT & PLAN NOTE
Received tamiflu during hospitalization course  Afebrile, cardiopulmonary system stable and without leukocytosis at discharge

## 2022-04-23 NOTE — DISCHARGE INSTR - AVS FIRST PAGE
Please continue to take all medications as prescribed, and as listed in your after visit summary  Please continue to take prednisone 20 mg by mouth (2 tablets) for 5 days  Please follow-up with cardiology and your primary care physician, within one week of discharge  A palliative care referral has been provided  Blood-work has been requested in one week for a basic metabolic panel

## 2022-04-23 NOTE — PROGRESS NOTES
NEPHROLOGY PROGRESS NOTE   Lisa Montoya 81 y o  female MRN: [id number]  Unit/Bed#: -01 Encounter: [id number]  Reason for Consult: KITTY    ASSESSMENT AND PLAN:  80 yo woman with PMH of CKD G 3a (baseline creatinine 1-1 2 milligrams/deciliter) presents with shortness of breath, was found to have influenza  Nephrology is consulted for KITTY    #Non-Oliguric KDIGO KITTY stage 1 on CKD G3a with evidence of kidney recovery     Etiology:  Likely secondary to hemodynamic changes in the settings of active infection   Baseline creatinine 1-1 2 milligrams/deciliter   Current creatinine:  0 8 milligrams/deciliter, below baseline   UA:  Micro hematuria   Renal imaging :  Not indicated at this time   Treatment:   Maintain MAP:  Over 65 mmHg if possible/avoid hypoperfusion:  Hold parameters on blood pressure medications   Avoid nephrotoxic agents such as NSAIDs, and IV contrast if possible  Avoid opioids    Adjust medications to GFR   Patient can follow-up with PCP    #CKD G3a  · Baseline creatinine:  1 1-1 2 milligrams/deciliter  · Etiology:  Likely secondary to nephrosclerosis      #Acid-base Disorder   serum HCO3 26 millimole per liters   A   At goal    #Volume status/hypertension:   Volume:  Euvolemic   Blood pressure:  Tendency to hypertension /80, goal< 140/90   Recommend:   Low-sodium diet   Can resume Lasix 20 milligrams      #Anemia:   Current hemoglobin:  12 milligrams/dL   Treatment:   Transfuse for hemoglobin less than 7 0 per primary service      # influenza  · Management as per primary team    Kidney function at baseline  Nephrology will sign off at this time  Thank you for involving us in this case  Please call us if any question  SUBJECTIVE:  Patient seen and examined at bedside  No chest pain, shortness of breath, nausea, vomiting, abdominal pain or diarrhea  No urinary complaints           OBJECTIVE:  Current Weight: Weight - Scale: 60 1 kg (132 lb 7 9 oz)  Vitals:    04/23/22 0803   BP:    Pulse:    Resp:    Temp:    SpO2: 98%       Intake/Output Summary (Last 24 hours) at 4/23/2022 0848  Last data filed at 4/23/2022 0401  Gross per 24 hour   Intake --   Output 200 ml   Net -200 ml     Wt Readings from Last 3 Encounters:   04/20/22 60 1 kg (132 lb 7 9 oz)   04/01/22 60 3 kg (133 lb)   02/01/22 58 6 kg (129 lb 3 2 oz)     Temp Readings from Last 3 Encounters:   04/22/22 98 1 °F (36 7 °C) (Oral)   04/01/22 98 5 °F (36 9 °C) (Tympanic)   02/01/22 99 1 °F (37 3 °C)     BP Readings from Last 3 Encounters:   04/22/22 120/78   04/01/22 128/70   02/01/22 102/62     Pulse Readings from Last 3 Encounters:   04/22/22 105   04/13/22 103   04/01/22 84        General:  Elderly, no acute distress at this time  Skin:  No acute rash  Eyes:  No scleral icterus and noninjected  ENT:  mucous membranes moist  Neck:  no carotid bruits  Chest:  Clear to auscultation percussion, good respiratory effort, no use of accessory respiratory muscles  CVS:  Regular rate and rhythm without a murmur rub  Abdomen:  soft and nontender   Extremities:  No clubbing, no cyanosis, no significant lower extremity edema  Neuro:  No gross focality  Psych:  Alert , cooperative       Medications:    Current Facility-Administered Medications:     acetaminophen (TYLENOL) tablet 650 mg, 650 mg, Oral, Q6H PRN, Scottie Monroe MD, 650 mg at 04/20/22 1430    albuterol (PROVENTIL HFA,VENTOLIN HFA) inhaler 2 puff, 2 puff, Inhalation, Q6H PRN, Scottie Monroe MD    apixaban (ELIQUIS) tablet 2 5 mg, 2 5 mg, Oral, BID, Scottie Monroe MD, 2 5 mg at 04/23/22 0847    cyanocobalamin (VITAMIN B-12) tablet 1,000 mcg, 1,000 mcg, Oral, Early Morning, Scottie Monroe MD, 1,000 mcg at 04/23/22 0557    escitalopram (LEXAPRO) tablet 10 mg, 10 mg, Oral, Daily, Scottie Monreo MD, 10 mg at 04/23/22 0847    ipratropium (ATROVENT) 0 02 % inhalation solution 0 5 mg, 0 5 mg, Nebulization, TID, Silvana Moore, MD, 0 5 mg at 04/23/22 0802    isosorbide mononitrate (IMDUR) 24 hr tablet 30 mg, 30 mg, Oral, Daily, Scottie Monroe MD, 30 mg at 04/23/22 0848    levalbuterol (Raford Ratel) inhalation solution 0 63 mg, 0 63 mg, Nebulization, TID, Silvana Moore MD, 0 63 mg at 04/23/22 0802    methylPREDNISolone sodium succinate (Solu-MEDROL) injection 40 mg, 40 mg, Intravenous, Q12H [address], Silvana Moore MD, 40 mg at 04/23/22 0848    metoprolol succinate (TOPROL-XL) 24 hr tablet 50 mg, 50 mg, Oral, Daily, Karla Lock MD, 50 mg at 04/23/22 0847    nystatin (MYCOSTATIN) powder, , Topical, BID, Silvana Moore MD, Given at 04/22/22 1702    ondansetron (ZOFRAN) injection 4 mg, 4 mg, Intravenous, Q6H PRN, Scottie Monroe MD    oseltamivir (TAMIFLU) capsule 30 mg, 30 mg, Oral, Q24H, Scottie Monroe MD, 30 mg at 04/23/22 0847    Insert peripheral IV, , , Once **AND** sodium chloride (PF) 0 9 % injection 3 mL, 3 mL, Intravenous, Q1H PRN, Brandan Burrell DO    Laboratory Results:  Results from last 7 days   Lab Units 04/23/22  0555 04/22/22  1003 04/21/22  1305 04/21/22  0510 04/20/22  0712   WBC Thousand/uL 6 89  --   --  4 43 11 01*   HEMOGLOBIN g/dL 12 0  --   --  13 0 15 2   HEMATOCRIT % 38 6  --   --  44 4 49 5*   PLATELETS Thousands/uL 179  --   --  186 246   SODIUM mmol/L 136 136  --  136 134*   POTASSIUM mmol/L 5 3 4 7 4 9 6 0* 5 4*   CHLORIDE mmol/L 102 100  --  101 98   CO2 mmol/L 26 31  --  27 29   BUN mg/dL 28* 30*  --  44* 39*   CREATININE mg/dL 0 85 1 10  --  1 41* 1 24   CALCIUM mg/dL 8 9 8 8  --  8 8 9 5       XR chest portable   Final Result by Mesha Harley MD (04/23 2678)   Stable exam    No acute cardiopulmonary disease  Workstation performed: [id number]         CT chest without contrast   Final Result by Liliane Bernal MD (04/20 0011)         1  Few small stable scattered 2 mm pulmonary nodules again noted   Based on current Fleischner Society 2017 Guidelines on incidental pulmonary nodule, no routine follow-up is needed if the patient is low risk  If the patient is high risk, optional    follow-up chest CT at 12 months can be considered  2   Additional findings as noted  Workstation performed: [id number]         X-ray chest 1 view portable   Final Result by Fany Corbett MD (04/20 6827)      No acute cardiopulmonary disease  Workstation performed: [id number]             Portions of the record may have been created with voice recognition software  Occasional wrong word or "sound a like" substitutions may have occurred due to the inherent limitations of voice recognition software  Read the chart carefully and recognize, using context, where substitutions have occurred

## 2022-04-23 NOTE — ASSESSMENT & PLAN NOTE
Lab Results   Component Value Date    EGFR 62 04/23/2022    EGFR 45 04/22/2022    EGFR 33 04/21/2022    CREATININE 0 85 04/23/2022    CREATININE 1 10 04/22/2022    CREATININE 1 41 (H) 04/21/2022   Mild elevation in creatinine yesterday, treated with gentle hydration per Nephrology  Creatinine normalized by the time of discharge  BMP in one week ordered at discharge

## 2022-04-23 NOTE — ASSESSMENT & PLAN NOTE
Continue toprol XL and eliquis  Follow-up with cardiology for further management given pacemaker findings

## 2022-04-23 NOTE — DISCHARGE SUMMARY
Diana 46  Discharge- Aure Bowden 1935, 81 y o  female MRN: [id number]  Unit/Bed#: -01 Encounter: [id number]  Primary Care Provider: Farzaneh Ellison MD   Date and time admitted to hospital: 4/20/2022  6:56 AM    Stage 3b chronic kidney disease [facility])  Assessment & Plan  Lab Results   Component Value Date    EGFR 62 04/23/2022    EGFR 45 04/22/2022    EGFR 33 04/21/2022    CREATININE 0 85 04/23/2022    CREATININE 1 10 04/22/2022    CREATININE 1 41 (H) 04/21/2022   Mild elevation in creatinine yesterday, treated with gentle hydration per Nephrology  Creatinine normalized by the time of discharge  BMP in one week ordered at discharge  Hyperkalemia  Assessment & Plan  Received calcium gluconate, insulin, dextrose, Kayexalate  Appreciate Nephrology input renal diet IV fluids  Potassium improved, but high-normal prior to discharge  Spironolactone discontinued during hospital stay  Recommend for repeat BMP in one week from discharge  Influenza  Assessment & Plan  Received tamiflu during hospitalization course  Afebrile, cardiopulmonary system stable and without leukocytosis at discharge  Paroxysmal atrial fibrillation (HCC)  Assessment & Plan  Continue toprol XL and eliquis  Follow-up with cardiology for further management given pacemaker findings  Anxiety and depression  Assessment & Plan  Continue escitalopram     COPD [facility]ch[facility]) ([facility] )  Assessment & Plan  Patient noted for some wheezing on prior assessment  Improved on the day of discharge  Wontinue albuterol p r n , She received scheduled nebulizer treatment, in addition to IV corticosteroids to address wheezing, and IV fluids were discontinued  She was discharged on prednisone 20 mg to be taken for 5 days, and recommended to continue pre-admission respiratory regimen      Chronic combined systolic and diastolic congestive heart failure ([facility] )  Assessment & Plan  Wt Readings from Last 3 Encounters:   04/20/22 60 1 kg (132 lb 7 9 oz)   04/01/22 60 3 kg (133 lb)   02/01/22 58 6 kg (129 lb 3 2 oz)     Improved however patient has low EF and poor quality of life  Continue on Imdur, carvedilol  Lasix was held due to elevated creatinine, which no approximates her baseline  She is her baseline weight  IV fluids per Nephrology  Cardiology consult appreciated, and on review of patient's pacemaker, she wass noted for high atrial flutter burden with periodic increases in ventricular rate as well as reduced biventricular pacing; there is a possibility that these findings can be contributing to decompensation in patient's heart failure  Pre-admission coreg was discontinued, and she was started on toprol XL  Spironolactone was discontinued due to high-normal K+  Lasix was modified to QOD scheduling on discharge  Consideration may be given to initiating Entresto and SGLT2 inhibitor in the future, as these have been shown to reduce hospitalization rate in individuals who have congestive heart failure- recommend follow-up with cardiology on discharge for longitudinal care  Hypertension  Assessment & Plan  Pre-admission spironolactone and coreg discontinued  Toprol XL 50 mg daily initiated  Lasix modified to QOD on discharge  Continue imdur  Follow-up with cardiology on discharge  * Shortness of breath  Assessment & Plan  Multifactorial likely congestive heart failure, influenza  Reports improvement in symptoms however still short of breath on exertion  Discussed with Cardiology, Nephrology  Given her poor prognosis cardiology recommended palliative care  Patient daughter is agreeable for this    Will consult palliative team   Continue supplemental oxygen as needed  See assessment and plan for COPD and CHF      PCP: Karli Rapp MD  Admission Date: 4/20/2022  Discharge Date: 04/23/22    Disposition:     Home with [facility] (Reminder: Complete face to face encounter)    Reason for Admission: Shortness of breath  Consultations During Hospital Stay:  · Cardiology  · Nephrology    Procedures Performed:     · None    Primary diagnosis:    Acute respiratory distress secondary to multiple etiologies    Secondary diagnosis:   Chronic combined CHF, COPD    Significant Findings / Test Results:     · None    Incidental Findings:   · None     Test Results Pending at Discharge (will require follow up): · None     Outpatient Tests Requested:  · BMP in one week    Complications: None    HPI:    Adapted from admission H&P: "Kristina Gonzalez is a 81 y o  female who presents with multiple complaints  Patient speaks Sinhala, patient daughter translated  Per patient's daughter patient started to feel sick this morning, with headache, generalized weakness, nausea, vomiting  No hematemesis, melena  No sick contacts       Complains of dry cough, shortness of breath started this morning  No orthopnea, PND  5 lb weight gain reported  Patient has done labs on 04/10/2022, which showed elevated BUN and creatinine and she has received a call from her PCP yesterday to hold Lasix and make it every other day      In the emergency department patient underwent workup, positive for influenza  BUN and creatinine continues to rise  Elevated BNP levels  Chest x-ray does not show any pneumonia or pleural effusion  Underwent CT chest as well"  Hospital Course:     Ms Jamila Mcgill was admitted to the hospitalist service for the management of respiratory distress  She received tamiflu for influenza positivity  She was noed for elevated potassium at 6, and received calcium gluconate, insulin, dextrose, Kayexalate  Potassium improved, but remained high-normal  Spironolactone was discontinued given these findings   She was also noted for KITTY and received light hydration, but this was discontinued after she developed wheezing this improved prior to discharge with the use of scheduled nebulizer treatment, in addition to IV corticosteroids  A BMP one week after discharge was requested, so as to monitor electrolytes and renal function  She was discharged on prednisone 20 mg to be taken for 5 days, and recommended to continue pre-admission respiratory regimen  Cardiology consult was obtained, and on review of patient's pacemaker, she was noted for high atrial flutter burden with periodic increases in ventricular rate as well as reduced biventricular pacing; it was considered that these findings were possible contributing factors to decompensation in patient's heart failure  Pre-admission coreg was discontinued, and she was started on toprol XL  Lasix was modified to QOD scheduling on discharge  Cardiology follow-up on discharge was recommended for further management of atrial fibrillation, pacemaker status and CHF recommended  Condition at Discharge: good     Discharge Day Visit / Exam:     Subjective:  Ms Jamila Mcgill was interviewed seated in chair by the bedside  Her daughter is present  She reports no issues and is in good spirits  Vitals: Blood Pressure: 144/80 (04/23/22 0803)  Pulse: (!) 109 (04/23/22 0803)  Temperature: (!) 96 5 °F (35 8 °C) (04/23/22 0803)  Temp Source: Tympanic (04/23/22 0803)  Respirations: 18 (04/23/22 0803)  Height: 4' 4" (132 1 cm) (04/20/22 1026)  Weight - Scale: 60 1 kg (132 lb 7 9 oz) (04/20/22 1026)  SpO2: 98 % (04/23/22 1015)  Exam:   Physical Exam  Vitals reviewed  Constitutional:       Appearance: Normal appearance  She is obese  HENT:      Mouth/Throat:      Mouth: Mucous membranes are moist    Eyes:      General:         Right eye: No discharge  Left eye: No discharge  Conjunctiva/sclera: Conjunctivae normal    Cardiovascular:      Rate and Rhythm: Normal rate and regular rhythm  Heart sounds: S1 normal and S2 normal    Pulmonary:      Effort: Pulmonary effort is normal       Breath sounds: Normal breath sounds   No wheezing, rhonchi or rales  Abdominal:      Palpations: Abdomen is soft  Musculoskeletal:         General: No swelling or tenderness  Cervical back: Neck supple  Skin:     General: Skin is warm and dry  Neurological:      General: No focal deficit present  Mental Status: She is alert  Psychiatric:         Mood and Affect: Mood normal          Behavior: Behavior normal        Discussion with Family: Daughter by the bedside  Discharge instructions/Information to patient and family:   See after visit summary for information provided to patient and family  Provisions for Follow-Up Care:  See after visit summary for information related to follow-up care and any pertinent home health orders  Planned Readmission: None  Discharge Medications:  See after visit summary for reconciled discharge medications provided to patient and family        ** Please Note: This note has been constructed using a voice recognition system **

## 2022-04-23 NOTE — ASSESSMENT & PLAN NOTE
Wt Readings from Last 3 Encounters:   04/20/22 60 1 kg (132 lb 7 9 oz)   04/01/22 60 3 kg (133 lb)   02/01/22 58 6 kg (129 lb 3 2 oz)     Improved however patient has low EF and poor quality of life  Continue on Imdur, carvedilol  Lasix was held due to elevated creatinine, which no approximates her baseline  She is her baseline weight  IV fluids per Nephrology  Cardiology consult appreciated, and on review of patient's pacemaker, she wass noted for high atrial flutter burden with periodic increases in ventricular rate as well as reduced biventricular pacing; there is a possibility that these findings can be contributing to decompensation in patient's heart failure  Pre-admission coreg was discontinued, and she was started on toprol XL  Spironolactone was discontinued due to high-normal K+  Lasix was modified to QOD scheduling on discharge  Consideration may be given to initiating Entresto and SGLT2 inhibitor in the future, as these have been shown to reduce hospitalization rate in individuals who have congestive heart failure- recommend follow-up with cardiology on discharge for longitudinal care

## 2022-04-23 NOTE — ASSESSMENT & PLAN NOTE
Pre-admission spironolactone and coreg discontinued  Toprol XL 50 mg daily initiated  Lasix modified to QOD on discharge  Continue imdur  Follow-up with cardiology on discharge  no

## 2022-04-23 NOTE — ASSESSMENT & PLAN NOTE
Multifactorial likely congestive heart failure, influenza  Reports improvement in symptoms however still short of breath on exertion  Discussed with Cardiology, Nephrology  Given her poor prognosis cardiology recommended palliative care  Patient daughter is agreeable for this    Will consult palliative team   Continue supplemental oxygen as needed  See assessment and plan for COPD and CHF

## 2022-04-23 NOTE — ASSESSMENT & PLAN NOTE
Patient noted for some wheezing on prior assessment  Improved on the day of discharge  Wontinue albuterol p r n , She received scheduled nebulizer treatment, in addition to IV corticosteroids to address wheezing, and IV fluids were discontinued  She was discharged on prednisone 20 mg to be taken for 5 days, and recommended to continue pre-admission respiratory regimen

## 2022-04-23 NOTE — PLAN OF CARE
Problem: INFECTION - ADULT  Goal: Absence or prevention of progression during hospitalization  Description: INTERVENTIONS:  - Assess and monitor for signs and symptoms of infection  - Monitor lab/diagnostic results  - Monitor all insertion sites, i e  indwelling lines, tubes, and drains  - Monitor endotracheal if appropriate and nasal secretions for changes in amount and color  - [place] appropriate cooling/warming therapies per order  - Administer medications as ordered  - Instruct and encourage patient and family to use good hand hygiene technique  - Identify and instruct in appropriate isolation precautions for identified infection/condition  Outcome: Progressing

## 2022-04-23 NOTE — ASSESSMENT & PLAN NOTE
Received calcium gluconate, insulin, dextrose, Kayexalate  Appreciate Nephrology input renal diet IV fluids  Potassium improved, but high-normal prior to discharge  Spironolactone discontinued during hospital stay  Recommend for repeat BMP in one week from discharge

## 2022-04-25 NOTE — UTILIZATION REVIEW
Notification of Discharge   This is a Notification of Discharge from our facility [address]  Please be advised that this patient has been discharge from our facility  Below you will find the admission and discharge date and time including the patients disposition  UTILIZATION REVIEW CONTACT:  HEATHER Pool  Utilization   Network Utilization Review Department  Phone: [phone] x carefully listen to the prompts  All voicemails are confidential   Email: [email or web address]     PHYSICIAN ADVISORY SERVICES:  FOR PGDB-LP-BAWN REVIEW - MEDICAL NECESSITY DENIAL  Phone: [phone]  Fax: [phone]  Email: [email or web address]     PRESENTATION DATE: 4/20/2022  6:56 AM  OBERVATION ADMISSION DATE:  INPATIENT ADMISSION DATE: 4/20/22  9:47 AM   DISCHARGE DATE: 4/23/2022 12:25 PM  DISPOSITION: Home with [place] with [address] INFORMATION:  Send all requests for admission clinical reviews, approved or denied determinations and any other requests to dedicated fax number below belonging to the campus where the patient is receiving treatment   List of dedicated fax numbers:  [address] [place] [address] DENIALS (Administrative/Medical Necessity) [phone]   [address]  (Maternity/NICU/Pediatrics) [phone]   [facility] [phone]   [address] [facility] [phone]   [address] [place] [address] [phone]   [address] [place] [address],[address] [phone]   [facility]  [phone]   [address] [place] [address], [address]  [address] [facility] [address] [facility] [phone]

## 2022-04-27 NOTE — PROGRESS NOTES
Cardiology Follow Up    Jay Hadley [facility]  1935  [id number]  [address] [facility] [address] [address] [phone]    1  Paroxysmal atrial fibrillation (HCC)  amiodarone 200 mg tablet   2  Hypertensive heart disease with congestive heart failure, unspecified heart failure type ([facility] )     3  Chronic combined systolic and diastolic congestive heart failure [facility])  Ambulatory Referral to Cardiology   4  Stage 3b chronic kidney disease ([place] [address] )     5  Mixed hyperlipidemia     6  CHF exacerbation [facility])  Ambulatory Referral to Cardiology     Discussion/Summary:    Cardiomyopathy with an ejection fraction of 30%  Recent admission was mostly for viral illness and she was actually little dehydrated at that time, though she was seen by Cardiology  She is currently on every other day Lasix  Her Coreg was changed to metoprolol because she was little tachycardic with the atrial flutter  She is currently on 50 mg of Toprol-XL daily  Her spironolactone was stopped  She is also on 30 mg of Imdur  When I found her in atrial flutter, I put her on Eliquis  She remains on this  She is currently on steroids but finishing this up soon  They think the edema is related to this  She is tachycardic  Because of this, she is not 100% Bi V pacing as she should  Discussed options  I am concerned the metoprolol may be making her hypotensive if I increase the dose  Therefore, I will add amiodarone  Add 20 mg twice a day x2 weeks and then decrease to daily  I think doing ablation or cardioversion may be too aggressive at this point, in any case the patient tells me she is asymptomatic  I will see her back in the short term with an EKG at that time  Previous History:  69-year-old female    She has a history of a cardiomyopathy, unclear if it is ischemic or nonischemic    She has an ejection fraction of 30% on echocardiogram done in the hospital   She had an ICD placed a [employer] device in [facility]   Over the last year, it seems like she has had several admissions for heart failure  However, she tends to turn around pretty quickly  On OV previously, she was found to be in atrial flutter, although asymptomatic  I increased her Coreg, started Eliquis  Interval History:    She now returns after hospitalization  She had viral illness  She was seen by Cardiology during the admission  Her heart rates were fast and she was changed from Coreg to metoprolol  The patient is denying all symptoms using her 2 daughters as  and my understanding of Thai, but the patient's daughter who she currently lives with tells me that she is c/o dizziness  Her BP in the office today is stable, but at last OV with me she was hypotensive  IN the hsopital, as well, she was hypotensive, and actually she required some fluid and the lasix was changed to every other day  She was seen by palliative care, and they decided that outpatient discussions regarding her goals of care was preferred  At this time, there are no plans for her to go back to [place], and she will be staying here      Medical Problems             Problem List     Hypertension    Hypertensive heart disease with congestive heart failure (HCC)    Wt Readings from Last 3 Encounters:   04/27/22 59 4 kg (131 lb)   04/20/22 60 1 kg (132 lb 7 9 oz)   04/01/22 60 3 kg (133 lb)       Gastroesophageal reflux disease without esophagitis    Chronic combined systolic and diastolic congestive heart failure (HCC)    Wt Readings from Last 3 Encounters:   04/27/22 59 4 kg (131 lb)   04/20/22 60 1 kg (132 lb 7 9 oz)   04/01/22 60 3 kg (133 lb)       Vitamin B12 deficiency    Vitamin D deficiency    Status post implantation of automatic cardioverter/defibrillator (AICD)    Obstructive sleep apnea syndrome    BMI 27 0-27 9,adult    COPD (chronic obstructive pulmonary disease) ([place] [address] )    Non-insulin dependent type 2 diabetes mellitus ([place] [address] )      Lab Results   Component Value Date    HGBA1C 6 4 (H) 04/10/2022         Mixed hyperlipidemia    Shortness of breath              Past Medical History:   Diagnosis Date    Anxiety and depression 2022    Atrial flutter ([place] [address] ) 2022    BMI 27 0-27 9,adult 2021    CH[facility]congestive heart failure) [facility])     Congestive heart failure (CHF) ([place] [address] ) 2021    COPD (chronic obstructive pulmonary disease) ([facility])     COPD (chronic obstructive pulmonary disease) ([place] [address] ) 2021    Diabetes 1 5, managed as type 2 ([place] [address] )     Fatigue 2022    Gastroesophageal reflux disease without esophagitis 2021    High blood pressure     Hyperglycemia 2022    Hypertension 2021    Hypertensive heart disease with congestive heart failure ([place] [address] ) 2021    Left knee pain 2022    Obstructive sleep apnea syndrome 2021    Other specified anemias 2022    Paroxysmal atrial fibrillation ([place] [address] ) 2022    Status post implantation of automatic cardioverter/defibrillator (AICD) 2021    Vitamin B12 deficiency 2021    Vitamin D deficiency 2021     Social History     Tobacco Use    Smoking status: Former Smoker     Packs/day: 2 00     Years: 50 00     Pack years: 100 00     Types: Cigarettes     Quit date:      Years since quittin 3    Smokeless tobacco: Never Used   Vaping Use    Vaping Use: Never used   Substance Use Topics    Alcohol use: Never    Drug use: Never      Family History   Problem Relation Age of Onset    Diabetes Sister     Diabetes Brother     Heart disease Daughter     Depression Paternal Grandmother      Past Surgical History:   Procedure Laterality Date    ATRIAL CARDIAC PACEMAKER INSERTION      REPLACEMENT TOTAL KNEE         Current Outpatient Medications:     apixaban (Eliquis) 2 5 mg, Take 1 tablet (2 5 mg total) by mouth 2 (two) times a day, Disp: 180 tablet, Rfl: 3    Cholecalciferol (Vitamin D) 125 MCG (5000 UT) CAPS, Take 1 tablet by mouth in the morning, Disp: , Rfl:     escitalopram (LEXAPRO) 10 mg tablet, Take 1 tablet (10 mg total) by mouth daily, Disp: 90 tablet, Rfl: 1    furosemide (LASIX) 20 mg tablet, Take 1 tablet (20 mg total) by mouth every other day, Disp: 15 tablet, Rfl: 0    isosorbide mononitrate (IMDUR) 30 mg 24 hr tablet, Take 30 mg by mouth daily, Disp: , Rfl:     metoprolol succinate (TOPROL-XL) 50 mg 24 hr tablet, Take 1 tablet (50 mg total) by mouth daily, Disp: 30 tablet, Rfl: 0    predniSONE 10 mg tablet, Take 2 tablets (20 mg total) by mouth daily for 5 days, Disp: 10 tablet, Rfl: 0    vitamin B-12 (VITAMIN B-12) 1,000 mcg tablet, Take 1 tablet by mouth daily in the early morning, Disp: , Rfl:     albuterol (PROVENTIL HFA,VENTOLIN HFA) 90 mcg/act inhaler, Inhale 2 puffs every 6 (six) hours as needed (Patient not taking: Reported on 4/20/2022 ), Disp: , Rfl:     amiodarone 200 mg tablet, Take 1 tablet (200 mg total) by mouth daily, Disp: 90 tablet, Rfl: 3  No Known Allergies    Vitals:    04/27/22 1423   BP: 138/80   BP Location: Left arm   Patient Position: Sitting   Cuff Size: Standard   Pulse: 98   Resp: 18   SpO2: 97%   Weight: 59 4 kg (131 lb)   Height: 4' 4" (1 321 m)     Vitals:    04/27/22 1423   Weight: 59 4 kg (131 lb)      Height: 4' 4" (132 1 cm)   Body mass index is 34 06 kg/m²      Physical Exam:  GEN: Marva Mcdonnell appears well, alert and oriented x 3, pleasant and cooperative   HEENT: pupils equal, round, and reactive to light; extraocular muscles intact  NECK: supple, no carotid bruits   HEART: regular rhythm, normal S1 and S2, no murmurs, clicks, gallops or rubs   LUNGS: clear to auscultation bilaterally; no wheezes, rales, or rhonchi   ABDOMEN: normal bowel sounds, soft, no tenderness, no distention  EXTREMITIES: trace LE edema  NEURO: no focal findings   SKIN: normal without suspicious lesions on exposed skin    ROS:  Except as noted in HPI, is otherwise reviewed in detail and a 12 point review of systems is negative  ROS reviewed and is unchanged    Labs:  Lab Results   Component Value Date    SODIUM 136 04/23/2022    K 5 3 04/23/2022     04/23/2022    CREATININE 0 85 04/23/2022    BUN 28 (H) 04/23/2022    CO2 26 04/23/2022    ALT 14 04/20/2022    AST 17 04/20/2022    INR 1 07 04/20/2022    GLUF 102 (H) 04/10/2022    HGBA1C 6 4 (H) 04/10/2022    WBC 6 89 04/23/2022    HGB 12 0 04/23/2022    HCT 38 6 04/23/2022     04/23/2022       No results found for: CHOL  No results found for: LDLCALC  No results found for: HDL  No results found for: TRIG    Testing:  Echo 12/2021:  Left Ventricle: Left ventricular cavity size is mildly to moderately dilated  The left ventricular ejection fraction is 30%  Systolic function is severely reduced  There is severe global hypokinesis with regional variation  There is eccentric hypertrophy    IVS: There is abnormal septal motion consistent with right ventricular pacing    Left Atrium: The atrium is mildly to moderately dilated    Aortic Valve: The aortic valve is trileaflet  The leaflets are moderately thickened  The leaflets are moderately calcified  The leaflets exhibit normal mobility  The valve appears sclerotic    Mitral Valve: There is annular calcification  There is mild to moderate regurgitation    Tricuspid Valve: There is moderate regurgitation  The right ventricular systolic pressure is severely elevated  The estimated right ventricular systolic pressure is 61 3 mmHg    Pulmonary Artery: The pulmonary artery systolic pressure is severely increased

## 2022-04-29 NOTE — TELEPHONE ENCOUNTER
Patient's daughter called to clarify amiodarone instructions  She thought she was to take amiodarone 200mg 2 tablets twice daily  I advised her the order is to take 200mg 1 tablet twice daily for 2 weeks then 1 tablet daily

## 2022-05-03 NOTE — TELEPHONE ENCOUNTER
Patient's daughter called concerned that her mother has mild left hand swelling that may be from lying on her left arm too long  She denies leg swelling or increased shortness of breath  Denies any other cardiac symptoms  She is a little more tired than usual  She is taking lasix 20mg every other day  Then will continue to monitor for symptoms weight gain or edema and call office if needed  Her next appointment is on 6/28 with you

## 2022-05-07 PROBLEM — J96.01 ACUTE RESPIRATORY FAILURE WITH HYPOXIA (HCC): Status: ACTIVE | Noted: 2022-01-01

## 2022-05-07 PROBLEM — I50.43 ACUTE ON CHRONIC COMBINED SYSTOLIC AND DIASTOLIC CONGESTIVE HEART FAILURE (HCC): Status: ACTIVE | Noted: 2021-01-01

## 2022-05-07 PROBLEM — Z71.89 GOALS OF CARE, COUNSELING/DISCUSSION: Status: ACTIVE | Noted: 2022-01-01

## 2022-05-07 NOTE — ASSESSMENT & PLAN NOTE
· Multifactorial secondary to acute on chronic combined congestive heart failure, atrial flutter, COPD, obesity, recent influenza, deconditioning  · See plan for individual problems below

## 2022-05-07 NOTE — H&P
[address] 1935, 81 y o  female MRN: [id number]  Unit/Bed#: E4 -01 Encounter: [id number]  Primary Care Provider: Bin Lua MD   Date and time admitted to hospital: 5/7/2022 10:07 AM    * Acute respiratory failure with hypoxia ([facility] )  Assessment & Plan  · Multifactorial secondary to acute on chronic combined congestive heart failure, atrial flutter, COPD, obesity, recent influenza, deconditioning  · See plan for individual problems below    Acute on chronic combined systolic and diastolic congestive heart failure (HCC)  Assessment & Plan  · Known history of congestive heart failure with EF 30% status post ICD  · Start Lasix 40 mg IV b i d    · Continue Toprol XL 50 mg daily  · Consult cardiology    Atrial flutter (HCC)  Assessment & Plan  · Recently started on amiodarone 200 mg daily and Eliquis 2 5 mg b i d   · Continue both    COPD (chronic obstructive pulmonary disease) ([facility] )  Assessment & Plan  · Without Exacerbation  Continue albuterol p r n  Goals of care, counseling/discussion  Assessment & Plan  · Goals of care discussion was done with the patient and her daughter  · She has been hospitalized 10 times this past year  · The patient and her family have come to the realization that she is not doing well  · They would like her to go home with home hospice and not be readmitted should her condition deteriorate  · Will consult hospice by case management the plan for home hospice  · See ACP note    Stage 3b chronic kidney disease [facility])  Assessment & Plan  Monitor renal function while on IV diuretics  Repeat BMP in a m      Non-insulin dependent type 2 diabetes mellitus [facility])  Assessment & Plan  Lab Results   Component Value Date    HGBA1C 6 4 (H) 04/10/2022       Diet controlled  A1c is controlled  Given age and current condition and comorbidities, allow regular diet      VTE Prophylaxis: Apixaban (Eliquis)  / sequential compression device   Code Status:  DNR  POLST: There is no POLST form on file for this patient (pre-hospital)  Discussion with family:  Daughter Polo Trejo    Anticipated Length of Stay:  Patient will be admitted on an Inpatient basis with an anticipated length of stay of  at least 2 midnights  Justification for Hospital Stay:  Shortness of breath    Total Time for Visit, including Counseling / Coordination of Care: 45 minutes  Greater than 50% of this total time spent on direct patient counseling and coordination of care  Chief Complaint:   Shortness of breath    History of Present Illness:    Aure Bowden is a 81 y o  female who presents with shortness of breath both at rest and on exertion  She has known history of CHF, atrial flutter, COPD, permanent ICD who was admitted at [facility] 2 weeks ago for respiratory failure  This was due to a combination of CHF, COPD, atrial flutter and influenza  She was discharged in stable condition and saw her cardiologist recently  Due to atrial flutter she was started on amiodarone and Eliquis  Since she was discharged from the hospital her daughter noticed persistent shortness of breath both at rest and on exertion, easy fatigability, poor appetite and overall just decline in function  She also complain of daily headaches  For the past year she has been hospitalized 10 times, 7 times in [facility] and 3 times here and the [place]  Her family thought that she would get better here however her condition continued to decline  They talked it over as a family and moving forward they do not want her to continue being readmitted in the hospital   The ultimately want her to go home with hospice  Of note her daughter is in the medical field and works in the hospice at [employer]  Today her breathing worsened and she was brought to the hospital   Upon arrival she was noted to be in distress and required fullface mask and BiPAP    She will received dose of Lasix 40 mg and was weaned down to oxygen via nasal cannula       Review of Systems:    Review of Systems   Constitutional: Positive for appetite change  Negative for chills and fever  HENT: Negative  Eyes: Negative  Respiratory: Positive for shortness of breath  Cardiovascular: Negative  Negative for chest pain and leg swelling  Gastrointestinal: Negative  Genitourinary: Negative  Musculoskeletal: Negative  Neurological: Positive for weakness  Psychiatric/Behavioral: Negative  All other systems reviewed and are negative  Past Medical and Surgical History:     Past Medical History:   Diagnosis Date    Anxiety and depression 1/26/2022    Atrial flutter ([place] [address] ) 1/26/2022    BMI 27 0-27 9,adult 12/16/2021    CHF [facility]congestive heart failure) ([facility])     Congestive heart failure (CHF) ([place] [address] ) 12/16/2021    COPD (chronic obstructive pulmonary disease) ([facility])     COPD (chronic obstructive pulmonary disease) ([place] [address] ) 12/16/2021    Diabetes 1 5, managed as type 2 ([place] [address] )     Fatigue 4/1/2022    Gastroesophageal reflux disease without esophagitis 12/16/2021    High blood pressure     Hyperglycemia 4/1/2022    Hypertension 12/16/2021    Hypertensive heart disease with congestive heart failure ([place] [address] ) 12/16/2021    Left knee pain 4/1/2022    Obstructive sleep apnea syndrome 12/16/2021    Other specified anemias 1/26/2022    Paroxysmal atrial fibrillation ([place] [address] ) 4/1/2022    Status post implantation of automatic cardioverter/defibrillator (AICD) 12/16/2021    Vitamin B12 deficiency 12/16/2021    Vitamin D deficiency 12/16/2021       Past Surgical History:   Procedure Laterality Date    ATRIAL CARDIAC PACEMAKER INSERTION      REPLACEMENT TOTAL KNEE         Meds/Allergies:    Prior to Admission medications    Medication Sig Start Date End Date Taking?  Authorizing Provider   albuterol (PROVENTIL HFA,VENTOLIN HFA) 90 mcg/act inhaler Inhale 2 puffs every 6 (six) hours as needed  Patient not taking: Reported on 2022     Historical Provider, MD   amiodarone 200 mg tablet Take 1 tablet (200 mg total) by mouth daily 22   Aman Jo MD   apixaban (Eliquis) 2 5 mg Take 1 tablet (2 5 mg total) by mouth 2 (two) times a day 22   Aman Jo MD   Cholecalciferol (Vitamin D) 125 MCG (5000 UT) CAPS Take 1 tablet by mouth in the morning    Historical Provider, MD   escitalopram (LEXAPRO) 10 mg tablet Take 1 tablet (10 mg total) by mouth daily 22  Yo Rob MD   furosemide (LASIX) 20 mg tablet Take 1 tablet (20 mg total) by mouth every other day 22  Li Lujan MD   isosorbide mononitrate (IMDUR) 30 mg 24 hr tablet Take 30 mg by mouth daily    Historical Provider, MD   metoprolol succinate (TOPROL-XL) 50 mg 24 hr tablet Take 1 tablet (50 mg total) by mouth daily 22  Li Lujan MD   vitamin B-12 (VITAMIN B-12) 1,000 mcg tablet Take 1 tablet by mouth daily in the early morning    Historical Provider, MD     I have reviewed home medications with a medical source (PCP, Pharmacy, other)      Allergies: No Known Allergies    Social History:     Marital Status:    Occupation:  None  Patient Pre-hospital Living Situation:  Lives with family  Patient Pre-hospital Level of Mobility:  None  Patient Pre-hospital Diet Restrictions:  None  Substance Use History:   Social History     Substance and Sexual Activity   Alcohol Use Never     Social History     Tobacco Use   Smoking Status Former Smoker    Packs/day: 2 00    Years: 50 00    Pack years: 100 00    Types: Cigarettes    Quit date:     Years since quittin 3   Smokeless Tobacco Never Used     Social History     Substance and Sexual Activity   Drug Use Never       Family History:    Family History   Problem Relation Age of Onset    Diabetes Sister     Diabetes Brother     Heart disease Daughter     Depression Paternal Grandmother        Physical Exam: Vitals:   Blood Pressure: 143/79 (05/07/22 1438)  Pulse: 97 (05/07/22 1438)  Temperature: 98 1 °F (36 7 °C) (05/07/22 0938)  Temp Source: Oral (05/07/22 9298)  Respirations: 18 (05/07/22 1438)  SpO2: 95 % (05/07/22 1438)    Physical Exam  Constitutional:       General: She is not in acute distress  Appearance: She is obese  She is ill-appearing  She is not toxic-appearing or diaphoretic  HENT:      Head: Normocephalic and atraumatic  Nose: No rhinorrhea  Eyes:      General: No scleral icterus  Extraocular Movements: Extraocular movements intact  Conjunctiva/sclera: Conjunctivae normal       Comments: Right corneal opacity   Cardiovascular:      Rate and Rhythm: Normal rate  Rhythm irregular  Heart sounds: No murmur heard  No gallop  Pulmonary:      Effort: No respiratory distress  Breath sounds: No wheezing or rales  Comments: Coarse breath sounds  Abdominal:      General: Abdomen is flat  There is no distension  Palpations: Abdomen is soft  Tenderness: There is no abdominal tenderness  Musculoskeletal:      Cervical back: Neck supple  No rigidity or tenderness  Right lower leg: No edema  Left lower leg: No edema  Skin:     General: Skin is warm and dry  Neurological:      Mental Status: She is alert  Comments: Awake alert oriented follows commands   Psychiatric:         Mood and Affect: Mood normal          Behavior: Behavior normal      Additional Data:     Lab Results: I have personally reviewed pertinent reports        Results from last 7 days   Lab Units 05/07/22  1114   WBC Thousand/uL 10 84*   HEMOGLOBIN g/dL 11 9   HEMATOCRIT % 39 0   PLATELETS Thousands/uL 182   NEUTROS PCT % 84*   LYMPHS PCT % 9*   MONOS PCT % 6   EOS PCT % 0     Results from last 7 days   Lab Units 05/07/22  1114   SODIUM mmol/L 139   POTASSIUM mmol/L 4 3   CHLORIDE mmol/L 102   CO2 mmol/L 30   BUN mg/dL 23   CREATININE mg/dL 1 20   ANION GAP mmol/L 7   CALCIUM mg/dL 8 8   GLUCOSE RANDOM mg/dL 115                       Imaging: I have personally reviewed pertinent reports  and I have personally reviewed pertinent films in PACS    XR chest 1 view portable    (Results Pending)   Chest x-ray vascular congestion    EKG, Pathology, and Other Studies Reviewed on Admission:   · EKG:  Ventricular paced rhythm    Allscripts / Epic Records Reviewed: Yes     ** Please Note: This note has been constructed using a voice recognition system   **

## 2022-05-07 NOTE — ACP (ADVANCE CARE PLANNING)
Serious Illness Conversation    1  What is your understanding now of where you are with your illness? Prognostic Understanding: appropriate understanding of prognosis     2  How much information about what is likely to be ahead with your illness would you like to have? 3  What did you (clinician) communicate to the patient? Prognostic Communication: Time - I wish we were not in this situation, but I am worried that time may be as short as 6 months to less than a year (express as a range, e g  days to weeks, weeks to months, months to a year)  4  If your health situation worsens, what are your most important goals? Goals: be at home     5  What are the biggest fears and worries about the future and your health? 6  What abilities are so critical to your life that you cannot imagine living without them? 7  What gives you strength as you think about the future with your illness? Family     8  If you become sicker, how much are you willing to go through for the possibility of gaining more time? Be in the hospital: No Have a feeding tube: No   Be in the ICU: No Live in a nursing home: No   Be on a ventilator: No    Be on dialysis: No Undergo aggressive test and/or procedures: No      9  How much does your proxy and family know about your priorities and wishes? How does this plan sound to you? I will do everything I can to help you through this          Advanced directives

## 2022-05-07 NOTE — PLAN OF CARE
Problem: Nutrition/Hydration-ADULT  Goal: Nutrient/Hydration intake appropriate for improving, restoring or maintaining nutritional needs  Description: Monitor and assess patient's nutrition/hydration status for malnutrition  Collaborate with interdisciplinary team and initiate plan and interventions as ordered  Monitor patient's weight and dietary intake as ordered or per policy  Utilize nutrition screening tool and intervene as necessary  Determine patient's food preferences and provide high-protein, high-caloric foods as appropriate       INTERVENTIONS:  - Monitor oral intake, urinary output, labs, and treatment plans  - Assess nutrition and hydration status and recommend course of action  - Evaluate amount of meals eaten  - Assist patient with eating if necessary   - Allow adequate time for meals  - Recommend/ encourage appropriate diets, oral nutritional supplements, and vitamin/mineral supplements  - Order, calculate, and assess calorie counts as needed  - Recommend, monitor, and adjust tube feedings and TPN/PPN based on assessed needs  - Assess need for intravenous fluids  - Provide specific nutrition/hydration education as appropriate  - Include patient/family/caregiver in decisions related to nutrition  Outcome: Progressing     Problem: MOBILITY - ADULT  Goal: Maintain or return to baseline ADL function  Description: INTERVENTIONS:  -  Assess patient's ability to carry out ADLs; assess patient's baseline for ADL function and identify physical deficits which impact ability to perform ADLs (bathing, care of mouth/teeth, toileting, grooming, dressing, etc )  - Assess/evaluate cause of self-care deficits   - Assess range of motion  - Assess patient's mobility; develop plan if impaired  - Assess patient's need for assistive devices and provide as appropriate  - Encourage maximum independence but intervene and supervise when necessary  - Involve family in performance of ADLs  - Assess for home care needs following discharge   - Consider OT consult to assist with ADL evaluation and planning for discharge  - Provide patient education as appropriate  Outcome: Progressing     Problem: Potential for Falls  Goal: Patient will remain free of falls  Description: INTERVENTIONS:  - Educate patient/family on patient safety including physical limitations  - Instruct patient to call for assistance with activity   - Consult OT/PT to assist with strengthening/mobility   - Keep Call bell within reach  - Keep bed low and locked with side rails adjusted as appropriate  - Keep care items and personal belongings within reach  - Initiate and maintain comfort rounds  - Make Fall Risk Sign visible to staff  - Offer Toileting every 2 Hours, in advance of need  - Initiate/Maintain  bealarm    - Apply yellow socks and bracelet for high fall risk patients  - Consider moving patient to room near nurses station  Outcome: Progressing

## 2022-05-07 NOTE — NURSING NOTE
Pt admitted with co SOB today  Presently on RA resting - daughter Poornima Rivero with pt - assisted with answering asmission questions

## 2022-05-07 NOTE — ASSESSMENT & PLAN NOTE
Lab Results   Component Value Date    HGBA1C 6 4 (H) 04/10/2022       Diet controlled  A1c is controlled  Given age and current condition and comorbidities, allow regular diet

## 2022-05-07 NOTE — ED PROVIDER NOTES
History  Chief Complaint   Patient presents with    Shortness of Breath     SOB and headache starting last night  patient with hx of COPD and heart failure     86y F brought in by daughter for evaluation of dyspnea  Reports 3 days of worsening sob/otero and chest tightness  Daughter said she's taking her meds as prescribed  Denies f/c/s, no congestion  Has a dry cough that is at baseline  Noted to have dry heaves this am and continues w/ nausea today  Denies vomiting, no abd pain  Did not try nebulizer or inhalers over the last couple of days      Pt only able to speak in 1 word responses and even that at times is too much  Daughter translated  Hx of copd, chf w/ EF 20%, AICD - hasn't fired  Was admitted at [facility] 4/20-4/23 for CHF exacerbation  Pt was positive for Influenza A on 4/20 and completed tamiflu per note  Did have med changes w/ recent admission  Was supposed to start amiodarone 200mg bid x2 wks (starting 4/27 and confirmed on 4/29 - should be bid until 5/11-5/13 depending on when she actually started taking it BID)      History provided by:  Patient, medical records and relative  History limited by:  Severe respiratory distress   used: Yes (daughter)    Shortness of Breath  Severity:  Severe  Onset quality:  Gradual  Duration:  3 days  Timing:  Constant  Progression:  Worsening  Chronicity:  Recurrent  Context: not URI    Relieved by:  Nothing  Worsened by:  Exertion, movement and coughing  Ineffective treatments:  None tried  Associated symptoms: chest pain (tightness) and cough    Associated symptoms: no abdominal pain, no diaphoresis, no fever, no hemoptysis, no sore throat, no sputum production, no vomiting and no wheezing        Prior to Admission Medications   Prescriptions Last Dose Informant Patient Reported? Taking?    Cholecalciferol (Vitamin D) 125 MCG (5000 UT) CAPS  Child Yes No   Sig: Take 1 tablet by mouth in the morning   albuterol (PROVENTIL HFA,VENTOLIN HFA) 90 mcg/act inhaler   Yes No   Sig: Inhale 2 puffs every 6 (six) hours as needed   Patient not taking: Reported on 4/20/2022    amiodarone 200 mg tablet   No No   Sig: Take 1 tablet (200 mg total) by mouth daily   apixaban (Eliquis) 2 5 mg  Child No No   Sig: Take 1 tablet (2 5 mg total) by mouth 2 (two) times a day   escitalopram (LEXAPRO) 10 mg tablet   No No   Sig: Take 1 tablet (10 mg total) by mouth daily   furosemide (LASIX) 20 mg tablet   No No   Sig: Take 1 tablet (20 mg total) by mouth every other day   isosorbide mononitrate (IMDUR) 30 mg 24 hr tablet  Child Yes No   Sig: Take 30 mg by mouth daily   metoprolol succinate (TOPROL-XL) 50 mg 24 hr tablet   No No   Sig: Take 1 tablet (50 mg total) by mouth daily   vitamin B-12 (VITAMIN B-12) 1,000 mcg tablet  Child Yes No   Sig: Take 1 tablet by mouth daily in the early morning      Facility-Administered Medications: None       Past Medical History:   Diagnosis Date    Anxiety and depression 1/26/2022    Atrial flutter ([place] [id number] ) 1/26/2022    BMI 27 0-27 9,adult 12/16/2021    [facility]     Congestive heart failure (CHF) ([facility]) 12/16/2021    COPD (chronic obstructive pulmonary disease) ([facility])     [facility] 12/16/2021    Diabetes 1 5, 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[address] )     Fatigue 4/1/2022    Gastroesophageal reflux disease without esophagitis 12/16/2021    High blood pressure     Hyperglycemia 4/1/2022    Hypertension 12/16/2021    Hypertensive heart disease with congestive heart failure ([place] [address] ) 12/16/2021    Left knee pain 4/1/2022    Obstructive sleep apnea syndrome 12/16/2021    Other specified anemias 1/26/2022    [facility]ox[facility] 4/1/2022    Status post implantation of automatic cardioverter/defibrillator (AICD) 12/16/2021    Vitamin B12 deficiency 12/16/2021    Vitamin D deficiency 12/16/2021       Past Surgical History:   Procedure Laterality Date    ATRIAL CARDIAC PACEMAKER INSERTION      REPLACEMENT TOTAL KNEE         Family History   Problem Relation Age of Onset    Diabetes Sister     Diabetes Brother     Heart disease Daughter     Depression Paternal Grandmother      I have reviewed and agree with the history as documented  E-Cigarette/Vaping    E-Cigarette Use Never User      E-Cigarette/Vaping Substances    Nicotine No     THC No     CBD No     Flavoring No     Other No     Unknown No      Social History     Tobacco Use    Smoking status: Former Smoker     Packs/day: 2 00     Years: 50 00     Pack years: 100 00     Types: Cigarettes     Quit date:      Years since quittin 3    Smokeless tobacco: Never Used   Vaping Use    Vaping Use: Never used   Substance Use Topics    Alcohol use: Never    Drug use: Never       Review of Systems   Unable to perform ROS: Severe respiratory distress   Constitutional: Negative for diaphoresis and fever  HENT: Negative for sore throat  Respiratory: Positive for cough and shortness of breath  Negative for hemoptysis, sputum production and wheezing  Cardiovascular: Positive for chest pain (tightness)  Gastrointestinal: Negative for abdominal pain and vomiting  Physical Exam  Physical Exam  Vitals and nursing note reviewed  Constitutional:       General: She is in acute distress  Comments: Sitting straight up in bed, somewhat tripoding (leaning off to the side on the side rail)   HENT:      Nose: Nose normal       Mouth/Throat:      Mouth: Mucous membranes are dry  Eyes:      Conjunctiva/sclera: Conjunctivae normal    Cardiovascular:      Rate and Rhythm: Regular rhythm  Tachycardia present  Pulmonary:      Effort: Tachypnea, accessory muscle usage, prolonged expiration and respiratory distress present  Breath sounds: Decreased air movement present  Decreased breath sounds present  Abdominal:      Palpations: Abdomen is soft  Tenderness:  There is no abdominal tenderness  Musculoskeletal:         General: No tenderness  Cervical back: Neck supple  Right lower leg: Edema present  Left lower leg: Edema present  Skin:     General: Skin is warm  Neurological:      General: No focal deficit present  Mental Status: She is alert  Mental status is at baseline  Psychiatric:         Mood and Affect: Mood is anxious           Vital Signs  ED Triage Vitals   Temperature Pulse Respirations Blood Pressure SpO2   05/07/22 0938 05/07/22 0938 05/07/22 0938 05/07/22 0938 05/07/22 0938   98 1 °F (36 7 °C) 102 (!) 28 139/78 97 %      Temp Source Heart Rate Source Patient Position - Orthostatic VS BP Location FiO2 (%)   05/07/22 0938 05/07/22 0938 05/07/22 1256 05/07/22 1256 --   Oral Monitor Lying Right arm       Pain Score       05/07/22 1256       No Pain           Vitals:    05/07/22 1438 05/07/22 1600 05/07/22 2313 05/08/22 0700   BP: 143/79 114/64 112/58 111/59   Pulse: 97 89 82 80   Patient Position - Orthostatic VS: Lying Lying Lying Lying         Visual Acuity      ED Medications  Medications   albuterol (PROVENTIL HFA,VENTOLIN HFA) inhaler 2 puff (has no administration in time range)   amiodarone tablet 200 mg (200 mg Oral Given 5/8/22 0842)   apixaban (ELIQUIS) tablet 2 5 mg (2 5 mg Oral Given 5/8/22 0842)   escitalopram (LEXAPRO) tablet 10 mg (10 mg Oral Given 5/8/22 0842)   isosorbide mononitrate (IMDUR) 24 hr tablet 30 mg (30 mg Oral Given 5/8/22 0842)   metoprolol succinate (TOPROL-XL) 24 hr tablet 50 mg (50 mg Oral Given 5/8/22 0842)   furosemide (LASIX) injection 40 mg (40 mg Intravenous Given 5/8/22 0804)   ipratropium (ATROVENT) 0 02 % inhalation solution 1 mg (1 mg Nebulization Given 5/7/22 1043)   albuterol inhalation solution 10 mg (10 mg Nebulization Given 5/7/22 1042)   furosemide (LASIX) injection 40 mg (40 mg Intravenous Given 5/7/22 1433)       Diagnostic Studies  Results Reviewed     Procedure Component Value Units Date/Time    HS Troponin I 4hr [[id number]]  (Normal) Collected: 05/07/22 1723    Lab Status: Final result Specimen: Blood from Line, Venous Updated: 05/07/22 1808     hs TnI 4hr 30 ng/L      Delta 4hr hsTnI 4 ng/L     HS Troponin I 2hr [[id number]]  (Normal) Collected: 05/07/22 1358    Lab Status: Final result Specimen: Blood from Arm, Right Updated: 05/07/22 1428     hs TnI 2hr 23 ng/L      Delta 2hr hsTnI -3 ng/L     HS Troponin 0hr (reflex protocol) [[id number]]  (Normal) Collected: 05/07/22 1121    Lab Status: Final result Specimen: Blood from Arm, Left Updated: 05/07/22 1256     hs TnI 0hr 26 ng/L     Basic metabolic panel [[id number]] Collected: 05/07/22 1114    Lab Status: Final result Specimen: Blood from Arm, Left Updated: 05/07/22 1151     Sodium 139 mmol/L      Potassium 4 3 mmol/L      Chloride 102 mmol/L      CO2 30 mmol/L      ANION GAP 7 mmol/L      BUN 23 mg/dL      Creatinine 1 20 mg/dL      Glucose 115 mg/dL      Calcium 8 8 mg/dL      eGFR 41 ml/min/1 73sq m     Narrative:      [place] guidelines for Chronic Kidney Disease (CKD):     Stage 1 with normal or high GFR (GFR > 90 mL/min/1 73 square meters)    Stage 2 Mild CKD (GFR = 60-89 mL/min/1 73 square meters)    Stage 3A Moderate CKD (GFR = 45-59 mL/min/1 73 square meters)    Stage 3B Moderate CKD (GFR = 30-44 mL/min/1 73 square meters)    Stage 4 Severe CKD (GFR = 15-29 mL/min/1 73 square meters)    Stage 5 End Stage CKD (GFR <15 mL/min/1 73 square meters)  Note: GFR calculation is accurate only with a steady state creatinine    NT-BNP PRO [[id number]]  (Abnormal) Collected: 05/07/22 1114    Lab Status: Final result Specimen: Blood from Arm, Left Updated: 05/07/22 1151     NT-proBNP 7,413 pg/mL     Magnesium [[id number]]  (Normal) Collected: 05/07/22 1114    Lab Status: Final result Specimen: Blood from Arm, Left Updated: 05/07/22 1151     Magnesium 1 9 mg/dL     CBC and differential [[id number]]  (Abnormal) Collected: 05/07/22 1114 Lab Status: Final result Specimen: Blood from Arm, Left Updated: 05/07/22 1129     WBC 10 84 Thousand/uL      RBC 4 40 Million/uL      Hemoglobin 11 9 g/dL      Hematocrit 39 0 %      MCV 89 fL      MCH 27 0 pg      MCHC 30 5 g/dL      RDW 16 7 %      MPV 10 4 fL      Platelets 536 Thousands/uL      nRBC 0 /100 WBCs      Neutrophils Relative 84 %      Immat GRANS % 1 %      Lymphocytes Relative 9 %      Monocytes Relative 6 %      Eosinophils Relative 0 %      Basophils Relative 0 %      Neutrophils Absolute 9 02 Thousands/µL      Immature Grans Absolute 0 14 Thousand/uL      Lymphocytes Absolute 0 96 Thousands/µL      Monocytes Absolute 0 69 Thousand/µL      Eosinophils Absolute 0 00 Thousand/µL      Basophils Absolute 0 03 Thousands/µL                  XR chest 1 view portable    (Results Pending)          Cardiomeg, no acute findings    Procedures  ECG 12 Lead Documentation Only    Date/Time: 5/7/2022 9:47 AM  Performed by: Ravinder Maria DO  Authorized by: Ravinder Maria DO     ECG reviewed by me, the ED Provider: yes    Patient location:  ED  Previous ECG:     Previous ECG:  Compared to current    Similarity:  Changes noted  Interpretation:     Interpretation: abnormal    Rate:     ECG rate:  107    ECG rate assessment: tachycardic    Rhythm:     Rhythm: paced    QRS:     QRS axis:  Left  ST segments:     ST segments:  Non-specific  T waves:     T waves: non-specific      CriticalCare Time  Performed by: Ravinder Maria DO  Authorized by: Ravinder Maria DO     Critical care provider statement:     Critical care time (minutes):  60    Critical care time was exclusive of:  Separately billable procedures and treating other patients and teaching time    Critical care was necessary to treat or prevent imminent or life-threatening deterioration of the following conditions:  Respiratory failure and cardiac failure    Critical care was time spent personally by me on the following activities:  Blood draw for specimens, obtaining history from patient or surrogate, development of treatment plan with patient or surrogate, discussions with consultants, evaluation of patient's response to treatment, examination of patient, review of old charts, re-evaluation of patient's condition, ordering and review of radiographic studies, ordering and review of laboratory studies and ordering and performing treatments and interventions             ED Course  ED Course as of 05/08/22 0859   Sat May 07, 2022   1030 Sig resp distress upon arrival - had nursing immediately page respiratory for BiPAP, will start AHUJA neg (can be finished in line w/ biPAP)  1145 Resting comfortably  Improved on BiPAP/AHUJA neb   1217 Creatinine: 1 20  0 85 two weeks ago   1300 Trailing off BiPAP  Seems to be doing okay on NC at this time  Will continue to closely monitor  Improved air movement throughout  1334 hs TnI 0hr: 26   1401 IV access finally obtained  36 Dr Nestor Mcdonnell recommends admission, will contact [facility]                                             MDM  Number of Diagnoses or Management Options  Acute respiratory distress: new and requires workup  CHF exacerbation [facility]): new and requires workup  Diagnosis management comments: Sig resp distress - likely multifactorial (chf/copd)  Pt compliant w/ diuretics - may need to increase frequency (only every other day at this point)  Additionally, has rescue inhaler at home (doesn't use) but no maintenance inhalers for her COPD    Will d/w cards since she will fall under a 30d re-admission, but considering pt sig distressed upon arrival, will likely admit       Amount and/or Complexity of Data Reviewed  Clinical lab tests: reviewed and ordered  Tests in the radiology section of CPT®: ordered and reviewed  Tests in the medicine section of CPT®: reviewed and ordered  Decide to obtain previous medical records or to obtain history from someone other than the patient: yes  Obtain history from someone other than the patient: yes  Discuss the patient with other providers: yes  Independent visualization of images, tracings, or specimens: yes        Disposition  Final diagnoses:   CHF exacerbation ([facility] )   Acute respiratory distress     Time reflects when diagnosis was documented in both MDM as applicable and the Disposition within this note     Time User Action Codes Description Comment    5/7/2022  3:04 PM Binta Chopra Add [I50 9] CHF exacerbation ([facility] )     5/7/2022  3:04 PM Binta Chopra Add [R06 03] Acute respiratory distress     5/7/2022  3:04 PM Binta Chopra Modify [I50 9] CHF exacerbation ([facility] )     5/7/2022  3:04 PM Luli GENTILE Modify [R06 03] Acute respiratory distress       ED Disposition     ED Disposition Condition Date/Time Comment    Admit Stable Sat May 7, 2022  2:36 PM Case was discussed with MI and the patient's admission status was agreed to be Admission Status: inpatient status to the service of Dr [facility]           Follow-up Information    None         Current Discharge Medication List      CONTINUE these medications which have NOT CHANGED    Details   albuterol (PROVENTIL HFA,VENTOLIN HFA) 90 mcg/act inhaler Inhale 2 puffs every 6 (six) hours as needed      amiodarone 200 mg tablet Take 1 tablet (200 mg total) by mouth daily  Qty: 90 tablet, Refills: 3    Associated Diagnoses: Paroxysmal atrial fibrillation (HCC)      apixaban (Eliquis) 2 5 mg Take 1 tablet (2 5 mg total) by mouth 2 (two) times a day  Qty: 180 tablet, Refills: 3    Associated Diagnoses: PAF (paroxysmal atrial fibrillation) (HCC)      Cholecalciferol (Vitamin D) 125 MCG (5000 UT) CAPS Take 1 tablet by mouth in the morning      escitalopram (LEXAPRO) 10 mg tablet Take 1 tablet (10 mg total) by mouth daily  Qty: 90 tablet, Refills: 1    Associated Diagnoses: Anxiety and depression      furosemide (LASIX) 20 mg tablet Take 1 tablet (20 mg total) by mouth every other day  Qty: 15 tablet, Refills: 0    Associated Diagnoses: CHF exacerbation (HCC)      isosorbide mononitrate (IMDUR) 30 mg 24 hr tablet Take 30 mg by mouth daily      metoprolol succinate (TOPROL-XL) 50 mg 24 hr tablet Take 1 tablet (50 mg total) by mouth daily  Qty: 30 tablet, Refills: 0    Associated Diagnoses: CHF exacerbation (HCC)      vitamin B-12 (VITAMIN B-12) 1,000 mcg tablet Take 1 tablet by mouth daily in the early morning             No discharge procedures on file      PDMP Review       Value Time User    PDMP Reviewed  Yes 4/21/2022  3:16 PM Joey Kahn, [facility]          ED Provider  Electronically Signed by           Monster Rodriguez DO  05/08/22 9765

## 2022-05-07 NOTE — ASSESSMENT & PLAN NOTE
· Known history of congestive heart failure with EF 30% status post ICD  · Start Lasix 40 mg IV b i d    · Continue Toprol XL 50 mg daily  · Consult cardiology

## 2022-05-07 NOTE — CONSULTS
ENCOUNTER DATE: 05/07/22 6:13 PM  PATIENT NAME: Vernell Santiago   1935    [id number]  Age: 81 y o  Sex: female  [place] PROVIDER & AUTHOR: Emani Burrows MD  INPATIENT ATTENDING PHYSICIAN: Ousmane Aranda*; PRIMARYCARE PHYSICIAN: Maynor Brown MD  DATE OF ADMISSION: 5/7/2022 10:07 AM; LENGTH OF STAY: 0 days  *-*-*-*-*-*-*-*-*-*-*-*-*-*-*-*-*-*-*-*-*-*-*-*-*-*-*-*-*-*-*-*-*-*-*-*-*-*-*-*-*-*-*-*-*-*-*-*-*-*-*-*-*-*-   REASON FOR CONSULTATION:   Decompensated heart failure    *-*-*-*-*-*-*-*-*-*-*-*-*-*-*-*-*-*-*-*-*-*-*-*-*-*-*-*-*-*-*-*-*-*-*-*-*-*-*-*-*-*-*-*-*-*-*-*-*-*-*-*-*-*-  CARDIAC ASSESSMENT:     1  Decompensated heart failure, heart failure with reduced ejection fraction  2  Unspecified cardiomyopathy  3  Persistent atrial flutter  4  COPD  5  CKD, stage IIIB  6  Primary hypertension and hypertensive heart disease  7   Dyslipidemia       Patient Active Problem List   Diagnosis    Hypertension    Hypertensive heart disease with congestive heart failure (HCC)    Gastroesophageal reflux disease without esophagitis    Acute on chronic combined systolic and diastolic congestive heart failure (HCC)    Vitamin B12 deficiency    Vitamin D deficiency    Status post implantation of automatic cardioverter/defibrillator (AICD)    Obstructive sleep apnea syndrome    BMI 27 0-27 9,adult    COPD (chronic obstructive pulmonary disease) (HCC)    Non-insulin dependent type 2 diabetes mellitus (HCC)    Mixed hyperlipidemia    Shortness of breath    Anxiety and depression    Atrial flutter (HCC)    Other specified anemias    Paroxysmal atrial fibrillation (HCC)    Left knee pain    Fatigue    Hyperglycemia    Influenza    Hyperkalemia    Stage 3b chronic kidney disease ([address] )    Acute respiratory failure with hypoxia ([address] )    Goals of care, counseling/discussion                   in [place]        Patient's presentation is consistent with acute on chronic decompensated heart failure  There appears to be some degree of anxiety as well  Currently appears to be euvolemic  Patient's daughter informs that patient had a cardiac catheterization at [facility]:     -- Agree with Furosemide 40 mg IV BID   -- Continue other medications at current doses  -- Will repeat renal function and electrolytes tomorrow  -- Depending on renal function and Potassium, will add Entresto tomorrow  -- Continued HF precautions are advised  -- Continued COPD therapy  *-*-*-*-*-*-*-*-*-*-*-*-*-*-*-*-*-*-*-*-*-*-*-*-*-*-*-*-*-*-*-*-*-*-*-*-*-*-*-*-*-*-*-*-*-*-*-*-*-*-*-*-*-*-  HISTORY OF PRESENT ILLNESS     Patient is a 81-year-old female of [address] [place] [address] origin  Her medical history is significant for:   1  Unspecified cardiomyopathy, suspected ischemic cardiomyopathy  2  History of biventricular AICD implantation  in 2021  3  Atrial flutter diagnosed in April 2022   4  COPD  5  GERD  6  Hypertension  7  Diabetes mellitus  8  Suspected obstructive sleep apnea   10  Vitamin-D deficiency  11  Degenerative joint disease, history of total knee replacement  12  Chronic kidney disease and hyperkalemia  13  Influenza a infection    Patient was recently hospitalized at [facility] between April 23, 2022 and April 23, 2022  She had presented with shortness of breath and was diets closed with decompensated heart failure, acute kidney injury and atrial flutter  She was diuresed and medications were adjusted before discharge  Patient has now presented to emergency room with her daughter with complaint of increasing shortness of breath  Patient's daughter is present in the room and is providing history  As per information patient has been having intermittent shortness of breath and some chest tightness in the last 3 days with progressive burden sending this morning  Patient has also been experiencing nausea and headache but no vomiting  Symptoms are episodic and occur randomly at rest and with activity  In between the symptom episodes patient's daughter notices that patient is anxious at times and breathing heavily  Patient's family has been monitoring her oxygen saturations at home and they have been consistently in 90s  In the emergency room patient was slightly tachycardic with heart rate of 102 beats per minute, she was tachypnea with respiratory rate of 28, her oxygen saturation was 97% on room air and blood pressure was 139/78  ECG showed atrial flutter with ventricular paced rhythm  Blood work was significant for elevated NT proBNP level, slightly elevated white set calm creatinine of 1 2 and potassium of 4 3  Patient is currently reports that she gets some shortness of breath randomly but otherwise states he she is comfortable  She denies any chest pain, dizziness or lightheadedness orthopnea PND or worsening pedal edema  She has been compliant with medications  She quit smoking over 20 years back and has no history of alcohol use  Her home medications included amiodarone 200 mg twice daily with plan to transition to once daily on 7th of May, albuterol inhaler, apixaban 2 5 mg twice daily, escitalopram 10 mg daily, isosorbide mononitrate 30 mg daily, metoprolol succinate 50 mg once daily      Her last cardiac evaluation was echocardiogram in December 2021 which showed mild to moderately dilated left ventricular cavity with severely reduced left ventricular systolic function with global hypokinesis, EF 30%, mild to moderate left atrial cavity enlargement, normal RV size and function, normal right atrial size, moderately thickened aortic valve with trace aortic valve regurgitation, mitral annular calcification with mild-to-moderate mitral valve regurgitation, moderate tricuspid valve regurgitation, trace pulmonic valve regurgitation and no pericardial effusion      *-*-*-*-*-*-*-*-*-*-*-*-*-*-*-*-*-*-*-*-*-*-*-*-*-*-*-*-*-*-*-*-*-*-*-*-*-*-*-*-*-*-*-*-*-*-*-*-*-*-*-*-*-*  PAST MEDICAL HISTORY:     Past Medical History:   Diagnosis Date    Anxiety and depression 2022    Atrial flutter ([place] [address] ) 2022    BMI 27 0-27 9,adult 2021    CH[facility]est[facility]) [facility]     Congestive heart failure (CHF) ([place] [address] ) 2021    COPD (chronic obstructive pulmonary disease) ([facility]     COPD (chronic obstructive pulmonary disease) ([place] [address] ) 2021    Diabetes 1 5, managed as type 2 ([place] [address] )     Fatigue 2022    Gastroesophageal reflux disease without esophagitis 2021    High blood pressure     Hyperglycemia 2022    Hypertension 2021    Hypertensive heart disease with congestive heart failure ([place] [address] ) 2021    Left knee pain 2022    Obstructive sleep apnea syndrome 2021    Other specified anemias 2022    Paroxysmal atrial fibrillation ([place] [address] ) 2022    Status post implantation of automatic cardioverter/defibrillator (AICD) 2021    Vitamin B12 deficiency 2021    Vitamin D deficiency 2021    PAST SURGICAL HISTORY     Past Surgical History:   Procedure Laterality Date    ATRIAL CARDIAC PACEMAKER INSERTION      REPLACEMENT TOTAL KNEE            FAMILY HISTORY     Family History   Problem Relation Age of Onset    Diabetes Sister     Diabetes Brother     Heart disease Daughter     Depression Paternal Grandmother      SOCIAL HISTORY     Social History     Tobacco Use   Smoking Status Former Smoker    Packs/day: 2 00    Years: 50 00    Pack years: 100 00    Types: Cigarettes    Quit date:     Years since quittin 3   Smokeless Tobacco Never Used      Social History     Substance and Sexual Activity   Alcohol Use Never     Social History     Substance and Sexual Activity   Drug Use Never    @[facility]@ *-*-*-*-*-*-*-*-*-*-*-*-*-*-*-*-*-*-*-*-*-*-*-*-*-*-*-*-*-*-*-*-*-*-*-*-*-*-*-*-*-*-*-*-*-*-*-*-*-*-*-*-*-*  ALLERGIES     No Known Allergies  CURRENT SCHEDULED MEDICATIONS       Current Facility-Administered Medications:     albuterol (PROVENTIL HFA,VENTOLIN HFA) inhaler 2 puff, 2 puff, Inhalation, Q6H PRN, Yvonne Hadley MD    amiodarone tablet 200 mg, 200 mg, Oral, Daily, Yvonne Hadley MD, 200 mg at 05/07/22 1725    apixaban (ELIQUIS) tablet 2 5 mg, 2 5 mg, Oral, BID, Yvonne Hadley MD, 2 5 mg at 05/07/22 1726    escitalopram (LEXAPRO) tablet 10 mg, 10 mg, Oral, Daily, Yvonne Hadley MD, 10 mg at 05/07/22 1726    furosemide (LASIX) injection 40 mg, 40 mg, Intravenous, BID (diuretic), Yvonne Hadley MD, 40 mg at 05/07/22 1727    isosorbide mononitrate (IMDUR) 24 hr tablet 30 mg, 30 mg, Oral, Daily, Yvonne Hadley MD, 30 mg at 05/07/22 1726    metoprolol succinate (TOPROL-XL) 24 hr tablet 50 mg, 50 mg, Oral, Daily, Yvonne Hadley MD, 50 mg at 05/07/22 1726     *-*-*-*-*-*-*-*-*-*-*-*-*-*-*-*-*-*-*-*-*-*-*-*-*-*-*-*-*-*-*-*-*-*-*-*-*-*-*-*-*-*-*-*-*-*-*-*-*-*-*-*-*-*   REVIEW OF SYSTEMS     Positive for:  As noted above in HPI  Negative for: All remaining as reviewed below and in HPI   SYSTEM SYMPTOMS REVIEWED:  General--weight change, fever, night sweats  Respiratoryl-- Wheezing, shortness of breath, cough, URI symptoms, sputum, blood  Cardiovascular--chest pain, syncope, dyspnea on exertion, edema, decline in exercise tolerance, claudication   Gastrointestinal--persistent vomiting, diarrhea, abdominal distention, blood in stool   Muscular or skeletal--joint pain or swelling   Neurologic--headaches, syncope, abnormal movement  Hematologic--history of easy bruising and bleeding   Endocrine--thyroid enlargement, heat or cold intolerance, polyuria   Psychiatric--anxiety, depression *-*-*-*-*-*-*-*-*-*-*-*-*-*-*-*-*-*-*-*-*-*-*-*-*-*-*-*-*-*-*-*-*-*-*-*-*-*-*-*-*-*-*-*-*-*-*-*-*-*-*-*-*-*-   VITAL SIGNS     Vitals:    22 1044 22 1256 22 1438 22 1600   BP:  137/81 143/79 114/64   BP Location:  Right arm Right arm Right arm   Pulse:  98 97 89   Resp:  18 18 (!) 26   Temp:    97 5 °F (36 4 °C)   TempSrc:    Temporal   SpO2: 97% 98% 95% 97%       TEMPERATURE HISTORY 24H: Temp (24hrs), Av 8 °F (36 6 °C), Min:97 5 °F (36 4 °C), Max:98 1 °F (36 7 °C)     BLOOD PRESSURE HISTORY: Systolic (92IDO), PHC:690 , Min:114 , MXE:500    Diastolic (80KOO), ZJL:08, Min:64, Max:81      WEIGHTS:   Wt Readings from Last 25 Encounters:   22 59 4 kg (131 lb)   22 60 1 kg (132 lb 7 9 oz)   22 60 3 kg (133 lb)   22 58 6 kg (129 lb 3 2 oz)   22 59 kg (130 lb)   22 59 3 kg (130 lb 11 2 oz)   21 57 6 kg (126 lb 15 8 oz)   21 58 5 kg (129 lb)        BMI: There is no height or weight on file to calculate BMI  I&O:     Intake/Output Summary (Last 24 hours) at 2022 1813  Last data filed at 2022 1701  Gross per 24 hour   Intake 220 ml   Output --   Net 220 ml      *-*-*-*-*-*-*-*-*-*-*-*-*-*-*-*-*-*-*-*-*-*-*-*-*-*-*-*-*-*-*-*-*-*-*-*-*-*-*-*-*-*-*-*-*-*-*-*-*-*-*-*-*-*-   PHYSICAL EXAMINATION:     General Appearance:    Alert, cooperative, no distress, appears stated age, appears slightly anxious   Head, Eyes, ENT:    No obvious abnormality, moist mucous mebranes  Neck:   Supple, no carotid bruit or JVD   Back:     Symmetric, no curvature  Lungs:     Respirations unlabored  decreased breath sounds bilaterally without any crackles   Chest wall:    No tenderness or deformity   Heart:    Regular rate and rhythm, S1 and S2 normal, no murmur, rub  or gallop     Abdomen:     Soft, non-tender, No obvious masses, or organomegaly   Extremities:   Extremities warm, no cyanosis or edema    Skin:   Skin color, texture, turgor normal, no rashes or lesions     *-*-*-*-*-*-*-*-*-*-*-*-*-*-*-*-*-*-*-*-*-*-*-*-*-*-*-*-*-*-*-*-*-*-*-*-*-*-*-*-*-*-*-*-*-*-*-*-*-*-*-*-*-*-   LABORATORY DATA:    I have personally reviewed the available laboratory data  CMP:  Results from last 7 days   Lab Units 05/07/22  1114   SODIUM mmol/L 139   POTASSIUM mmol/L 4 3   CHLORIDE mmol/L 102   CO2 mmol/L 30   BUN mg/dL 23   CREATININE mg/dL 1 20   CALCIUM mg/dL 8 8        Results from last 7 days   Lab Units 05/07/22  1114   MAGNESIUM mg/dL 1 9        Cardiac Profile:   Results from last 7 days   Lab Units 05/07/22  1114   NT-PRO BNP pg/mL 7,413*                CBC:   Results from last 7 days   Lab Units 05/07/22  1114   WBC Thousand/uL 10 84*   HEMOGLOBIN g/dL 11 9   HEMATOCRIT % 39 0   PLATELETS Thousands/uL 182     PT/INR: No results found for: PT, INR, Microbiology:          *-*-*-*-*-*-*-*-*-*-*-*-*-*-*-*-*-*-*-*-*-*-*-*-*-*-*-*-*-*-*-*-*-*-*-*-*-*-*-*-*-*-*-*-*-*-*-*-*-*-*-*-*-*-   RADIOLOGY RESULTS:     No results found  *-*-*-*-*-*-*-*-*-*-*-*-*-*-*-*-*-*-*-*-*-*-*-*-*-*-*-*-*-*-*-*-*-*-*-*-*-*-*-*-*-*-*-*-*-*-*-*-*-*-*-*-*-*-    LAST ECG, ECHOCARDIOGRAM AND OTHER CARDIOLOGY TEST RESULTS:     No results found for this visit on 05/07/22  No results found for this or any previous visit  No results found for this or any previous visit  No results found for this or any previous visit  No results found for this or any previous visit  *-*-*-*-*-*-*-*-*-*-*-*-*-*-*-*-*-*-*-*-*-*-*-*-*-*-*-*-*-*-*-*-*-*-*-*-*-*-*-*-*-*-*-*-*-*-*-*-*-*-*-*-*-*-  SIGNATURES:   @MQQ@   Salvador Love MD     CC:   Neli Johnson MD   No ref   provider found

## 2022-05-07 NOTE — ASSESSMENT & PLAN NOTE
· Goals of care discussion was done with the patient and her daughter  · She has been hospitalized 10 times this past year  · The patient and her family have come to the realization that she is not doing well  · They would like her to go home with home hospice and not be readmitted should her condition deteriorate    · Will consult hospice by case management the plan for home hospice  · See ACP note

## 2022-05-08 NOTE — PROGRESS NOTES
[facility]  Progress Note - Jany Aviles 1935, 81 y o  female MRN: [id number]  Unit/Bed#: E4 -01 Encounter: [id number]  Primary Care Provider: Buddy Flynn MD   Date and time admitted to hospital: 5/7/2022 10:07 AM    Acute on chronic combined systolic and diastolic congestive heart failure ([facility] )  Assessment & Plan  · 26-year-old female from [facility] with a history of cardiomyopathy likely ischemic status post AICD implantation in 2025 admitted with acute on chronic systolic heart failure  · Known history of congestive heart failure with EF 30% status post ICD  · Cardiology input appreciated  On Lasix 40 mg IV b i d    · Continue Toprol XL 50 mg daily  · Plan to start Entresto today if renal functions and potassium okay  · Appears to be euvolemic today    Atrial flutter (HCC)  Assessment & Plan  · Recently started on amiodarone 200 mg daily and Eliquis 2 5 mg b i d   · Continue both    COPD (chronic obstructive pulmonary disease) (HCC)  Assessment & Plan  · Without Exacerbation  ·  Continue albuterol p r n      Non-insulin dependent type 2 diabetes mellitus ([facility] )  Assessment & Plan  Lab Results   Component Value Date    HGBA1C 6 4 (H) 04/10/2022       Diet controlled  A1c is controlled  Given age and current condition and comorbidities, allow regular diet      Stage 3b chronic kidney disease ([place] [address] )  Assessment & Plan  Monitor renal function while on IV diuretics  Current creatinine of 1 18 and a GFR of 42  Continue to monitor BMP    * Acute respiratory failure with hypoxia (HCC)  Assessment & Plan  · Multifactorial secondary to acute on chronic combined congestive heart failure, atrial flutter, COPD, obesity, recent influenza, deconditioning  · Stable currently saturating at 99% on room air  · See plan for individual problems below    Goals of care, counseling/discussion  Assessment & Plan  · Goals of care discussion was done with the patient and her daughter by Dr Hope Scruggs  · She has been hospitalized 10 times this past year  · The patient and her family have come to the realization that she is not doing well  · They would like her to go home with home hospice and not be readmitted should her condition deteriorate  · Will consult hospice by case management the plan for home hospice  · See ACP note        Subjective:  Patient is sitting up in bed  Looks comfortable in no respiratory distress  Endorses no specific complaints  Physical Exam:   Vitals: Blood pressure 111/59, pulse 80, temperature (!) 97 4 °F (36 3 °C), temperature source Temporal, resp  rate 20, weight 60 kg (132 lb 4 4 oz), SpO2 99 %  ,Body mass index is 34 39 kg/m²  Gen:  , non-tachypnic, non-dyspnic  Conversant  Heart: irregular rate and rhythm, S1S2 present, no murmur, rub or gallop  Lungs:  Scattered bibasilar ralesAbd: soft, non-tender, non-distended  NABS, no guarding, rebound or peritoneal signs  Extremities: no clubbing, cyanosis or edema  2+pedal pulses bilaterally  Full range of motion  Neuro: awake, alert and oriented  Cranial nerves 2-12 intact  Strength and sensation grossly intact  Skin: warm and dry: no petechiae, purpura and rash      LABS:   Results from last 7 days   Lab Units 05/08/22  0559 05/07/22  1114   WBC Thousand/uL 6 98 10 84*   HEMOGLOBIN g/dL 10 3* 11 9   HEMATOCRIT % 33 5* 39 0   PLATELETS Thousands/uL 177 182     Results from last 7 days   Lab Units 05/08/22  0559 05/07/22  1114   POTASSIUM mmol/L 4 1 4 3   CHLORIDE mmol/L 102 102   CO2 mmol/L 33* 30   BUN mg/dL 24 23   CREATININE mg/dL 1 18 1 20   CALCIUM mg/dL 8 3 8 8       Intake/Output Summary (Last 24 hours) at 5/8/2022 1039  Last data filed at 5/7/2022 1701  Gross per 24 hour   Intake 220 ml   Output --   Net 220 ml         Current Facility-Administered Medications   Medication Dose Route Frequency Provider Last Rate    albuterol  2 puff Inhalation Q6H PRN Marilyn Bennett MD  amiodarone  200 mg Oral Daily Delia Shen MD      apixaban  2 5 mg Oral BID Delia Shen MD      escitalopram  10 mg Oral Daily Delia Shen MD      furosemide  40 mg Intravenous BID (diuretic) Delia Shen MD      isosorbide mononitrate  30 mg Oral Daily Delia Shen MD      metoprolol succinate  50 mg Oral Daily Delia Shen MD         Family update- updated daughter

## 2022-05-08 NOTE — ASSESSMENT & PLAN NOTE
Monitor renal function while on IV diuretics  Current creatinine of 1 18 and a GFR of 42  Continue to monitor BMP

## 2022-05-08 NOTE — ASSESSMENT & PLAN NOTE
· Multifactorial secondary to acute on chronic combined congestive heart failure, atrial flutter, COPD, obesity, recent influenza, deconditioning  · Stable currently saturating at 99% on room air  · See plan for individual problems below

## 2022-05-08 NOTE — CASE MANAGEMENT
Case Management Assessment    Patient name Aure Bowden  Location [place] [address] /E4  0043-* MRN [id number]  : 1935 Date 2022       Current Admission Date: 2022  Current Admission Diagnosis:Acute respiratory failure with hypoxia [facility])   Patient Active Problem List    Diagnosis Date Noted    Acute respiratory failure with hypoxia ([facility] ) 2022    Goals of care, counseling/discussion 2022    Influenza 2022    Hyperkalemia 2022    Stage [facility]b [facility] ) 2022    Paroxysmal atrial fibrillation ([facility] ) 2022    Left knee pain 2022    Fatigue 2022    Hyperglycemia 2022    Anxiety and depression 2022    [facility] fl[facility] ) 2022    Other specified anemias 2022    Shortness of breath 2021    Hypertension 2021    Hypertensive heart disease with congestive heart failure [facility] ) 2021    Gastroesophageal reflux disease without esophagitis 2021    Acute on chronic combined systolic and diastolic congestive heart failure ([facility] ) 2021    Vitamin B12 deficiency 2021    Vitamin D deficiency 2021    Status post implantation of automatic cardioverter/defibrillator (AICD) 2021    Obstructive sleep apnea syndrome 2021    BMI 27 0-27 9,adult 2021    COPD (chronic obstructive pulmonary disease) ([facility] ) 2021    Non-insulin dependent type 2 diabetes mellitus ([facility] ) 2021    Mixed hyperlipidemia 2021      LOS (days): 1  Geometric Mean LOS (GMLOS) (days):   Days to GMLOS:     OBJECTIVE:  PATIENT READMITTED TO HOSPITAL  Risk of Unplanned Readmission Score: 15         Current admission status: Inpatient       Preferred Pharmacy:   [facility] [address] [facility], [address] [place] [id number]  Phone: [phone] Fax: [address] [place] [address] Darlene RAMOS [address]  Phone: [phone] Fax: [phone]    Primary Care Provider: Jung Rocha MD    Primary Insurance: MEDICARE MISC REPLACEMENT  Secondary Insurance:     ASSESSMENT:  Rajesh [employer]     Omayra Polanco Representative - Daughter   Primary Phone: [phone] [facility]  Home Phone: [phone]  Work Phone: [phone]               Advance Directives  Does patient have a [address] [place] [address] Avenue?: No  Does patient have Advance Directives?: No  Primary Contact: Alma Hancock [phone]    Readmission Root Cause  30 Day Readmission: Yes  Patient was readmitted due to: Acute Respiratory Failure with hypoxia    Patient Information  Admitted from[de-identified] Home  Mental Status: Alert  During Assessment patient was accompanied by: Not accompanied during assessment  Assessment information provided by[de-identified] Daughter  Primary Caregiver: Self  Caregiver's Name[de-identified] Alma Hancock [phone]  Caregiver's Relationship to Patient[de-identified] Family Member  Caregiver's Telephone Number[de-identified] Alma Hancock [phone]  Support Systems: [facility] Alyshagwendolyn Bourne members  Home entry access options   Select all that apply : Elevator  Type of Current Residence: Apartment  Floor Level: 7  Upon entering residence, is there a bedroom on the main floor (no further steps)?: Yes  Upon entering residence, is there a bathroom on the main floor (no further steps)?: Yes  Living Arrangements: Lives w/ Daughter    Activities of Daily Living Prior to Admission  Functional Status: Assistance  Completes ADLs independently?: No  Level of ADL dependence: Assistance  Ambulates independently?: Yes  Level of ambulatory dependence: Assistance  Does patient use assisted devices?: Yes  Assisted Devices (DME) used: Bert Scripture  Does patient currently own DME?: Yes  What DME does the patient currently own?: Dossie Furnish  Does patient have a history of Outpatient Therapy (PT/OT)?: No  Does the patient have a history of Short-Term Rehab?: No  Does patient have a history of HHC?: No  Does patient currently have [facility]?: No    Patient Information Continued  Does patient have prescription coverage?: Yes  Does patient have a history of substance abuse?: No  Does patient have a history of Mental Health Diagnosis?: No    Means of Transportation  Means of Transport to Appts[de-identified] Family transport    Patient's dgt's will provide transportation home when discharged  Patient stays dotty/ Ivan Ortega [phone] is requesting referral be made to [address] [place] [address] hospice  Referal made in [facility]

## 2022-05-08 NOTE — PROGRESS NOTES
CARDIOLOGY INPATIENT FOLLOW-UP PROGRESS NOTE  *-*-*-*-*-*-*-*-*-*-*-*-*-*-*-*-*-*-*-*-*-*-*-*-*-*-*-*-*-*-*-*-*-*-*-*-*-*-*-*-*-*-*-*-*-*-*-*-*-*-*-*-*-*-  Alexandru Adame DATE: 05/08/22 4:05 PM   AUTHOR: Salvador Love MD  PATIENT: Shirin Rogel   1935    [id number]   81 y o    female  INPATIENT HOSPITALIST PHYSICIAN: Ricardo Aranda*  DATE OF ADMISSION: 5/7/2022 10:07 AM; LENGTH OF STAY: 1 days  *-*-*-*-*-*-*-*-*-*-*-*-*-*-*-*-*-*-*-*-*-*-*-*-*-*-*-*-*-*-*-*-*-*-*-*-*-*-*-*-*-*-*-*-*-*-*-*-*-*-*-*-*-*-    CARDIOLOGY ASSESSMENT:  1  Decompensated heart failure, heart failure with reduced ejection fraction  2  Unspecified cardiomyopathy  3  Persistent atrial flutter  4  COPD  5  CKD, stage IIIB  6  Primary hypertension and hypertensive heart disease  7  Dyslipidemia    *-*-*-*-*-*-*-*-*-*-*-*-*-*-*-*-*-*-*-*-*-*-*-*-*-*-*-*-*-*-*-*-*-*-*-*-*-*-*-*-*-*-*-*-*-*-*-*-*-*-*-*-*-*-    CURRENT CARDIAC MEDICATIONS:  Amiodarone 200 mg daily, apixaban 2 5 mg twice daily, furosemide 40 mg IV b i d , isosorbide mononitrate 30 mg daily, metoprolol succinate 50 mg once daily      PERTINENT LABS AND OTHER INVESTIGATIONS:  Creatinine 1 18, potassium 4 1, sodium 141, peak high sensitivity troponin was 30, hematocrit 33 5, NT proBNP at presentation was 7413    ECHOCARDIOGRAM AND OTHER CARDIAC TESTS RESULTS: Echocardiogram in December 2021 which showed mild to moderately dilated left ventricular cavity with severely reduced left ventricular systolic function with global hypokinesis, EF 30%, mild to moderate left atrial cavity enlargement, normal RV size and function, normal right atrial size, moderately thickened aortic valve with trace aortic valve regurgitation, mitral annular calcification with mild-to-moderate mitral valve regurgitation, moderate tricuspid valve regurgitation, trace pulmonic valve regurgitation and no pericardial effusion  *-*-*-*-*-*-*-*-*-*-*-*-*-*-*-*-*-*-*-*-*-*-*-*-*-*-*-*-*-*-*-*-*-*-*-*-*-*-*-*-*-*-*-*-*-*-*-*-*-*-*-*-*-*-  PLAN:  --  may transition to oral furosemide 40 mg once daily beginning tomorrow  Will also add Entresto 24-26 mg half pill twice daily begin tonight  -- will continue other medications including amiodarone, apixaban, metoprolol succinate and isosorbide mononitrate  -- will repeat renal function and electrolytes tomorrow  -- anticipated discharge tomorrow  -- we have requested for family to get records from [facility] as this will tell us if she has ischemic or nonischemic cardiomyopathy  This can be pursued in the outpatient setting  Patient will follow-up with her cardiologist Dr Chaka Granados as outpatient  *-*-*-*-*-*-*-*-*-*-*-*-*-*-*-*-*-*-*-*-*-*-*-*-*-*-*-*-*-*-*-*-*-*-*-*-*-*-*-*-*-*-*-*-*-*-*-*-*-*-*-*-*-*-  INTERVAL CHANGES / HISTORY OF PRESENT ILLNESS:   No acute events overnight  Patient is sitting comfortably  Her daughter is next to her and translating  Patient denies any chest pain shortness of breath dizziness palpitations  Ambulating to bathroom without symptoms      *-*-*-*-*-*-*-*-*-*-*-*-*-*-*-*-*-*-*-*-*-*-*-*-*-*-*-*-*-*-*-*-*-*-*-*-*-*-*-*-*-*-*-*-*-*-*-*-*-*-*-*-*-*    PERTINENT REVIEW OF SYMPTOMS:  As noted above in HPI    *-*-*-*-*-*-*-*-*-*-*-*-*-*-*-*-*-*-*-*-*-*-*-*-*-*-*-*-*-*-*-*-*-*-*-*-*-*-*-*-*-*-*-*-*-*-*-*-*-*-*-*-*-*-  VITAL SIGNS:  Vitals:    22 2313 22 0600 22 0700 22 1524   BP: 112/58  111/59 101/58   BP Location: Right arm  Left arm Left arm   Pulse: 82  80 66   Resp: 20  20 20   Temp: (!) 97 °F (36 1 °C)  (!) 97 4 °F (36 3 °C) (!) 97 3 °F (36 3 °C)   TempSrc: Temporal  Temporal Temporal   SpO2: 97%  99% 97%   Weight:  60 kg (132 lb 4 4 oz)        Temp (24hrs), Av 2 °F (36 2 °C), Min:97 °F (36 1 °C), Max:97 4 °F (36 3 °C)  Current: Temperature: (!) 97 3 °F (36 3 °C)    Weight    22 0600   Weight: 60 kg (132 lb 4 4 oz)      Body mass index is 34 39 kg/m²  Wt Readings from Last 3 Encounters:   05/08/22 60 kg (132 lb 4 4 oz)   04/27/22 59 4 kg (131 lb)   04/20/22 60 1 kg (132 lb 7 9 oz)      Intake/Output Summary (Last 24 hours) at 5/8/2022 1605  Last data filed at 5/8/2022 1452  Gross per 24 hour   Intake 620 ml   Output 1000 ml   Net -380 ml          *-*-*-*-*-*-*-*-*-*-*-*-*-*-*-*-*-*-*-*-*-*-*-*-*-*-*-*-*-*-*-*-*-*-*-*-*-*-*-*-*-*-*-*-*-*-*-*-*-*-*-*-*-*-  PHYSICAL EXAM:  General Appearance:    Alert, cooperative, in no respiratory distress, appears stated age   Head, Eyes, ENT:    No obvious abnormality, moist mucous mebranes  Neck:   Supple, no carotid bruit or JVD   Back:     Symmetric, no curvature  Lungs:     Respirations unlabored  Clear to auscultation bilaterally,    Chest wall:    No tenderness or deformity   Heart:    Regular rate and rhythm, S1 and S2 normal, no murmur, rub  or gallop  Abdomen:     Soft, non-tender, No obvious masses, or organomegaly   Extremities:   Extremities warm, no cyanosis or edema    Skin:   Skin color, texture, turgor normal, no rashes or lesions     *-*-*-*-*-*-*-*-*-*-*-*-*-*-*-*-*-*-*-*-*-*-*-*-*-*-*-*-*-*-*-*-*-*-*-*-*-*-*-*-*-*-*-*-*-*-*-*-*-*-*-*-*-*-  TELEMETRY, LAST ECG:  Telemetry reviewed    Not on telemetry Results for orders placed or performed during the hospital encounter of 05/07/22   ECG 12 lead   Result Value    Ventricular Rate 107    Atrial Rate 108    MD Interval     QRSD Interval 98    QT Interval 362    QTC Interval 483    P Axis     QRS Axis -36    T Wave Axis 137    Narrative    Sinus tachycardia with Atrial sensed  Ventricular-paced rhythm   Prolonged MD interval  Abnormal ECG  When compared with ECG of 20-APR-2022 18:56,  Ventricualr rate has increased      Confirmed by Kamran Mccoy ([id number]) on 5/7/2022 6:45:04 PM      *-*-*-*-*-*-*-*-*-*-*-*-*-*-*-*-*-*-*-*-*-*-*-*-*-*-*-*-*-*-*-*-*-*-*-*-*-*-*-*-*-*-*-*-*-*-*-*-*-*-*-*-*-*-      CURRENT SCHEDULED MEDICATIONS:    Current Facility-Administered Medications:     albuterol (PROVENTIL HFA,VENTOLIN HFA) inhaler 2 puff, 2 puff, Inhalation, Q6H PRN, Yuri Rivera MD    amiodarone tablet 200 mg, 200 mg, Oral, Daily, Yuri Rivera MD, 200 mg at 05/08/22 6751    apixaban (ELIQUIS) tablet 2 5 mg, 2 5 mg, Oral, BID, Yuri Rivera MD, 2 5 mg at 05/08/22 4536    bisacodyl (DULCOLAX) EC tablet 5 mg, 5 mg, Oral, Daily PRN, Doron Traore MD    escitalopram (LEXAPRO) tablet 10 mg, 10 mg, Oral, Daily, Yuri Rivera MD, 10 mg at 05/08/22 5034    furosemide (LASIX) injection 40 mg, 40 mg, Intravenous, BID (diuretic), Yuri Rivera MD, 40 mg at 05/08/22 1601    isosorbide mononitrate (IMDUR) 24 hr tablet 30 mg, 30 mg, Oral, Daily, Yuri Rivera MD, 30 mg at 05/08/22 0245    metoprolol succinate (TOPROL-XL) 24 hr tablet 50 mg, 50 mg, Oral, Daily, Yuri Rivera MD, 50 mg at 05/08/22 0403 ALLERGIES:  No Known Allergies     *-*-*-*-*-*-*-*-*-*-*-*-*-*-*-*-*-*-*-*-*-*-*-*-*-*-*-*-*-*-*-*-*-*-*-*-*-*-*-*-*-*-*-*-*-*-*-*-*-*-*-*-*-*  LABORATORY DATA:  I have personally reviewed pertinent labs      CMP:  Results from last 7 days   Lab Units 05/08/22  0559 05/07/22  1114   SODIUM mmol/L 141 139   POTASSIUM mmol/L 4 1 4 3   CHLORIDE mmol/L 102 102   CO2 mmol/L 33* 30   BUN mg/dL 24 23   CREATININE mg/dL 1 18 1 20   CALCIUM mg/dL 8 3 8 8        Results from last 7 days   Lab Units 05/07/22  1114   MAGNESIUM mg/dL 1 9        Cardiac Profile:   Results from last 7 days   Lab Units 05/07/22  1114   NT-PRO BNP pg/mL 7,413*                CBC:   Results from last 7 days   Lab Units 05/08/22  0559 05/07/22  1114   WBC Thousand/uL 6 98 10 84*   HEMOGLOBIN g/dL 10 3* 11 9   HEMATOCRIT % 33 5* 39 0   PLATELETS Thousands/uL 177 182     PT/INR: No results found for: PT, INR, Microbiology: *-*-*-*-*-*-*-*-*-*-*-*-*-*-*-*-*-*-*-*-*-*-*-*-*-*-*-*-*-*-*-*-*-*-*-*-*-*-*-*-*-*-*-*-*-*-*-*-*-*-*-*-*-*-  IMAGING STUDIES REPORTS: Imaging studies results reviewed    No valid procedures specified  No Chest XR results available for this patient  *-*-*-*-*-*-*-*-*-*-*-*-*-*-*-*-*-*-*-*-*-*-*-*-*-*-*-*-*-*-*-*-*-*-*-*-*-*-*-*-*-*-*-*-*-*-*-*-*-*-*-*-*-*-    No results found for this or any previous visit  No results found for this or any previous visit  No results found for this or any previous visit  No results found for this or any previous visit         *-*-*-*-*-*-*-*-*-*-*-*-*-*-*-*-*-*-*-*-*-*-*-*-*-*-*-*-*-*-*-*-*-*-*-*-*-*-*-*-*-*-*-*-*-*-*-*-*-*-*-*-*-*-  SIGNATURES:   [unfilled]   [facility] MD Eliane

## 2022-05-08 NOTE — UTILIZATION REVIEW
Inpatient Admission Authorization Request   NOTIFICATION OF INPATIENT ADMISSION/INPATIENT AUTHORIZATION REQUEST   SERVICING FACILITY:   [address] [place] [address], [facility], [address] [facility]  Tax ID: [id number]  NPI: [id number]  Place of Service: Inpatient [address] [facility]  [address]  Place of Service Code: 24     ATTENDING PROVIDER:  Attending Name and NPI#: Radha Ruvalcaba Md [[id number]]  Address: [address] [place] [address], [facility], [facility] [address] [facility]  Phone: [phone]     UTILIZATION REVIEW CONTACT:  [facility] Utilization   Network Utilization Review Department  Phone: [phone]  Fax: [phone]  Email: [email or web address]     PHYSICIAN ADVISORY SERVICES:  FOR VVGI-[id number]-CRMJ REVIEW - MEDICAL NECESSITY DENIAL  Phone: [phone]  Fax: [phone]  Email: [email or web address]     TYPE OF REQUEST:  Inpatient Status     ADMISSION INFORMATION:  ADMISSION DATE/TIME: 5/7/22  3:05 PM  PATIENT DIAGNOSIS CODE/DESCRIPTION:  Acute respiratory distress [R06 03]  SOB (shortness of breath) [R06 02]  CHF exacerbation ([facility] [address] ) [I50 9]  DISCHARGE DATE/TIME: No discharge date for patient encounter  IMPORTANT INFORMATION:  Please contact the [facility] directly with any questions or concerns regarding this request  Department voicemails are confidential     Send requests for admission clinical reviews, concurrent reviews, approvals, and administrative denials due to lack of clinical to fax [phone]

## 2022-05-08 NOTE — PLAN OF CARE
Problem: Nutrition/Hydration-ADULT  Goal: Nutrient/Hydration intake appropriate for improving, restoring or maintaining nutritional needs  Description: Monitor and assess patient's nutrition/hydration status for malnutrition  Collaborate with interdisciplinary team and initiate plan and interventions as ordered  Monitor patient's weight and dietary intake as ordered or per policy  Utilize nutrition screening tool and intervene as necessary  Determine patient's food preferences and provide high-protein, high-caloric foods as appropriate       INTERVENTIONS:  - Monitor oral intake, urinary output, labs, and treatment plans  - Assess nutrition and hydration status and recommend course of action  - Evaluate amount of meals eaten  - Assist patient with eating if necessary   - Allow adequate time for meals  - Recommend/ encourage appropriate diets, oral nutritional supplements, and vitamin/mineral supplements  - Order, calculate, and assess calorie counts as needed  - Recommend, monitor, and adjust tube feedings and TPN/PPN based on assessed needs  - Assess need for intravenous fluids  - Provide specific nutrition/hydration education as appropriate  - Include patient/family/caregiver in decisions related to nutrition  Outcome: Progressing     Problem: MOBILITY - ADULT  Goal: Maintain or return to baseline ADL function  Description: INTERVENTIONS:  -  Assess patient's ability to carry out ADLs; assess patient's baseline for ADL function and identify physical deficits which impact ability to perform ADLs (bathing, care of mouth/teeth, toileting, grooming, dressing, etc )  - Assess/evaluate cause of self-care deficits   - Assess range of motion  - Assess patient's mobility; develop plan if impaired  - Assess patient's need for assistive devices and provide as appropriate  - Encourage maximum independence but intervene and supervise when necessary  - Involve family in performance of ADLs  - Assess for home care needs following discharge   - Consider OT consult to assist with ADL evaluation and planning for discharge  - Provide patient education as appropriate  Outcome: Progressing  Goal: Maintains/Returns to pre admission functional level  Description: INTERVENTIONS:  - Perform BMAT or MOVE assessment daily    - Set and communicate daily mobility goal to care team and patient/family/caregiver  - Collaborate with rehabilitation services on mobility goals if consulted  - Perform Range of Motion  times a day  - Reposition patient every  hours    - Dangle patient  times a day  - Stand patient  times a day  - Ambulate patient  times a day  - Out of bed to chair  times a day   - Out of bed for meals  times a day  - Out of bed for toileting  - Record patient progress and toleration of activity level   Outcome: Progressing     Problem: Potential for Falls  Goal: Patient will remain free of falls  Description: INTERVENTIONS:  - Educate patient/family on patient safety including physical limitations  - Instruct patient to call for assistance with activity   - Consult OT/PT to assist with strengthening/mobility   - Keep Call bell within reach  - Keep bed low and locked with side rails adjusted as appropriate  - Keep care items and personal belongings within reach  - Initiate and maintain comfort rounds  - Make Fall Risk Sign visible to staff  - Offer Toileting every  Hours, in advance of need  - Initiate/Maintain alarm  - Obtain necessary fall risk management equipment:   - Apply yellow socks and bracelet for high fall risk patients  - Consider moving patient to room near nurses station  Outcome: Progressing

## 2022-05-08 NOTE — PLAN OF CARE
Problem: Nutrition/Hydration-ADULT  Goal: Nutrient/Hydration intake appropriate for improving, restoring or maintaining nutritional needs  Description: Monitor and assess patient's nutrition/hydration status for malnutrition  Collaborate with interdisciplinary team and initiate plan and interventions as ordered  Monitor patient's weight and dietary intake as ordered or per policy  Utilize nutrition screening tool and intervene as necessary  Determine patient's food preferences and provide high-protein, high-caloric foods as appropriate       INTERVENTIONS:  - Monitor oral intake, urinary output, labs, and treatment plans  - Assess nutrition and hydration status and recommend course of action  - Evaluate amount of meals eaten  - Assist patient with eating if necessary   - Allow adequate time for meals  - Recommend/ encourage appropriate diets, oral nutritional supplements, and vitamin/mineral supplements  - Order, calculate, and assess calorie counts as needed  - Recommend, monitor, and adjust tube feedings and TPN/PPN based on assessed needs  - Assess need for intravenous fluids  - Provide specific nutrition/hydration education as appropriate  - Include patient/family/caregiver in decisions related to nutrition  Outcome: Progressing     Problem: MOBILITY - ADULT  Goal: Maintain or return to baseline ADL function  Description: INTERVENTIONS:  -  Assess patient's ability to carry out ADLs; assess patient's baseline for ADL function and identify physical deficits which impact ability to perform ADLs (bathing, care of mouth/teeth, toileting, grooming, dressing, etc )  - Assess/evaluate cause of self-care deficits   - Assess range of motion  - Assess patient's mobility; develop plan if impaired  - Assess patient's need for assistive devices and provide as appropriate  - Encourage maximum independence but intervene and supervise when necessary  - Involve family in performance of ADLs  - Assess for home care needs following discharge   - Consider OT consult to assist with ADL evaluation and planning for discharge  - Provide patient education as appropriate  Outcome: Progressing  Goal: Maintains/Returns to pre admission functional level  Description: INTERVENTIONS:  - Perform BMAT or MOVE assessment daily    - Set and communicate daily mobility goal to care team and patient/family/caregiver  - Collaborate with rehabilitation services on mobility goals if consulted  - Perform Range of Motion  times a day  - Reposition patient every  hours    - Dangle patient  times a day  - Stand patient  times a day  - Ambulate patient  times a day  - Out of bed to chair  times a day   - Out of bed for meals  times a day  - Out of bed for toileting  - Record patient progress and toleration of activity level   Outcome: Progressing     Problem: Potential for Falls  Goal: Patient will remain free of falls  Description: INTERVENTIONS:  - Educate patient/family on patient safety including physical limitations  - Instruct patient to call for assistance with activity   - Consult OT/PT to assist with strengthening/mobility   - Keep Call bell within reach  - Keep bed low and locked with side rails adjusted as appropriate  - Keep care items and personal belongings within reach  - Initiate and maintain comfort rounds  - Make Fall Risk Sign visible to staff  - Offer Toileting every  Hours, in advance of need  - Initiate/Maintain larm  - Obtain necessary fall risk management equipment:   - Apply yellow socks and bracelet for high fall risk patients  - Consider moving patient to room near nurses station  Outcome: Progressing

## 2022-05-08 NOTE — ASSESSMENT & PLAN NOTE
· 60-year-old female from [facility] [place] with a history of cardiomyopathy likely ischemic status post AICD implantation in 2025 admitted with acute on chronic systolic heart failure  · Known history of congestive heart failure with EF 30% status post ICD  · Cardiology input appreciated  On Lasix 40 mg IV b i d    · Continue Toprol XL 50 mg daily  · Plan to start Entresto today if renal functions and potassium okay    · Appears to be euvolemic today

## 2022-05-08 NOTE — ASSESSMENT & PLAN NOTE
· Goals of care discussion was done with the patient and her daughter by Dr Garret Osorio  · She has been hospitalized 10 times this past year  · The patient and her family have come to the realization that she is not doing well  · They would like her to go home with home hospice and not be readmitted should her condition deteriorate    · Will consult hospice by case management the plan for home hospice  · See ACP note

## 2022-05-09 NOTE — PLAN OF CARE
Problem: Nutrition/Hydration-ADULT  Goal: Nutrient/Hydration intake appropriate for improving, restoring or maintaining nutritional needs  Description: Monitor and assess patient's nutrition/hydration status for malnutrition  Collaborate with interdisciplinary team and initiate plan and interventions as ordered  Monitor patient's weight and dietary intake as ordered or per policy  Utilize nutrition screening tool and intervene as necessary  Determine patient's food preferences and provide high-protein, high-caloric foods as appropriate       INTERVENTIONS:  - Monitor oral intake, urinary output, labs, and treatment plans  - Assess nutrition and hydration status and recommend course of action  - Evaluate amount of meals eaten  - Assist patient with eating if necessary   - Allow adequate time for meals  - Recommend/ encourage appropriate diets, oral nutritional supplements, and vitamin/mineral supplements  - Order, calculate, and assess calorie counts as needed  - Recommend, monitor, and adjust tube feedings and TPN/PPN based on assessed needs  - Assess need for intravenous fluids  - Provide specific nutrition/hydration education as appropriate  - Include patient/family/caregiver in decisions related to nutrition  Outcome: Progressing     Problem: MOBILITY - ADULT  Goal: Maintain or return to baseline ADL function  Description: INTERVENTIONS:  -  Assess patient's ability to carry out ADLs; assess patient's baseline for ADL function and identify physical deficits which impact ability to perform ADLs (bathing, care of mouth/teeth, toileting, grooming, dressing, etc )  - Assess/evaluate cause of self-care deficits   - Assess range of motion  - Assess patient's mobility; develop plan if impaired  - Assess patient's need for assistive devices and provide as appropriate  - Encourage maximum independence but intervene and supervise when necessary  - Involve family in performance of ADLs  - Assess for home care needs following discharge   - Consider OT consult to assist with ADL evaluation and planning for discharge  - Provide patient education as appropriate  Outcome: Progressing  Goal: Maintains/Returns to pre admission functional level  Description: INTERVENTIONS:  - Perform BMAT or MOVE assessment daily    - Set and communicate daily mobility goal to care team and patient/family/caregiver     - Collaborate with rehabilitation services on mobility goals if consulted  - Ambulate patient 3 times a day  - Out of bed to chair 3 times a day   - Out of bed for meals 3 times a day  - Out of bed for toileting  - Record patient progress and toleration of activity level   Outcome: Progressing     Problem: Potential for Falls  Goal: Patient will remain free of falls  Description: INTERVENTIONS:  - Educate patient/family on patient safety including physical limitations  - Instruct patient to call for assistance with activity   - Consult OT/PT to assist with strengthening/mobility   - Keep Call bell within reach  - Keep bed low and locked with side rails adjusted as appropriate  - Keep care items and personal belongings within reach  - Initiate and maintain comfort rounds  - Make Fall Risk Sign visible to staff  - Offer Toileting every 2 Hours, in advance of need  - Initiate/Maintain bed alarm  - Obtain necessary fall risk management equipment: bed alarm  - Apply yellow socks and bracelet for high fall risk patients  - Consider moving patient to room near nurses station  Outcome: Progressing     Problem: PAIN - ADULT  Goal: Verbalizes/displays adequate comfort level or baseline comfort level  Description: Interventions:  - Encourage patient to monitor pain and request assistance  - Assess pain using appropriate pain scale  - Administer analgesics based on type and severity of pain and evaluate response  - Implement non-pharmacological measures as appropriate and evaluate response  - Consider cultural and social influences on pain and pain management  - Notify physician/advanced practitioner if interventions unsuccessful or patient reports new pain  Outcome: Progressing     Problem: INFECTION - ADULT  Goal: Absence or prevention of progression during hospitalization  Description: INTERVENTIONS:  - Assess and monitor for signs and symptoms of infection  - Monitor lab/diagnostic results  - Monitor all insertion sites, i e  indwelling lines, tubes, and drains  - Monitor endotracheal if appropriate and nasal secretions for changes in amount and color  - Rhodes appropriate cooling/warming therapies per order  - Administer medications as ordered  - Instruct and encourage patient and family to use good hand hygiene technique  - Identify and instruct in appropriate isolation precautions for identified infection/condition  Outcome: Progressing     Problem: DISCHARGE PLANNING  Goal: Discharge to home or other facility with appropriate resources  Description: INTERVENTIONS:  - Identify barriers to discharge w/patient and caregiver  - Arrange for needed discharge resources and transportation as appropriate  - Identify discharge learning needs (meds, wound care, etc )  - Arrange for interpretive services to assist at discharge as needed  - Refer to Case Management Department for coordinating discharge planning if the patient needs post-hospital services based on physician/advanced practitioner order or complex needs related to functional status, cognitive ability, or social support system  Outcome: Progressing     Problem: Knowledge Deficit  Goal: Patient/family/caregiver demonstrates understanding of disease process, treatment plan, medications, and discharge instructions  Description: Complete learning assessment and assess knowledge base    Interventions:  - Provide teaching at level of understanding  - Provide teaching via preferred learning methods  Outcome: Progressing     Problem: CARDIOVASCULAR - ADULT  Goal: Maintains optimal cardiac output and hemodynamic stability  Description: INTERVENTIONS:  - Monitor I/O, vital signs and rhythm  - Monitor for S/S and trends of decreased cardiac output  - Administer and titrate ordered vasoactive medications to optimize hemodynamic stability  - Assess quality of pulses, skin color and temperature  - Assess for signs of decreased coronary artery perfusion  - Instruct patient to report change in severity of symptoms  Outcome: Progressing  Goal: Absence of cardiac dysrhythmias or at baseline rhythm  Description: INTERVENTIONS:  - Continuous cardiac monitoring, vital signs, obtain 12 lead EKG if ordered  - Administer antiarrhythmic and heart rate control medications as ordered  - Monitor electrolytes and administer replacement therapy as ordered  Outcome: Progressing     Problem: RESPIRATORY - ADULT  Goal: Achieves optimal ventilation and oxygenation  Description: INTERVENTIONS:  - Assess for changes in respiratory status  - Assess for changes in mentation and behavior  - Position to facilitate oxygenation and minimize respiratory effort  - Oxygen administered by appropriate delivery if ordered  - Initiate smoking cessation education as indicated  - Encourage broncho-pulmonary hygiene including cough, deep breathe, Incentive Spirometry  - Assess the need for suctioning and aspirate as needed  - Assess and instruct to report SOB or any respiratory difficulty  - Respiratory Therapy support as indicated  Outcome: Progressing

## 2022-05-09 NOTE — DISCHARGE SUMMARY
[address] [facility]  Discharge- [facility] 1935, 81 y o  female MRN: [id number]  Unit/Bed#: E4 -01 Encounter: [id number]  Primary Care Provider: Yo Rob MD   Date and time admitted to hospital: 5/7/2022 10:07 AM    Acute on chronic combined systolic and diastolic congestive heart failure ([address] )  Assessment & Plan  · 29-year-old female from [facility] with a history of cardiomyopathy likely ischemic status post AICD implantation in 2025 admitted with acute on chronic systolic heart failure  · Known history of congestive heart failure with EF 30% status post ICD  · Patient improved with IV diuresis  · Cardiology evaluated during hospital stay  · Continue metoprolol succinate 50 mg daily, Imdur 30 mg daily  · Initially patient was started on Entresto, but given the patient's wish to return home on hospice and need for frequent laboratory monitoring this was discontinued  Patient was started on losartan 25 mg daily  Goals of care, counseling/discussion  Assessment & Plan  · Goals of care discussion was done with the patient and her daughter by Dr Dash Meneses and myself  · She has been hospitalized more than 10 times this past year  · They would like her to go home with home hospice and not be readmitted should her condition deteriorate  · See ACP note  · [facility] will be taking over patient's care and evaluate 5/10/2022 in their home  Patient does not wish to turn offer ICD at this time  This can be addressed with [facility]  · Discharged on Roxanol solution and p r n   Ativan    * Acute respiratory failure with hypoxia (HCC)  Assessment & Plan  · Multifactorial secondary to acute on chronic combined congestive heart failure, atrial flutter, COPD, obesity, recent influenza, deconditioning  · Resolved      Discharging Physician / Practitioner: Tonio Schumacher DO  PCP: Yo Rob MD  Admission Date:   Admission Orders (From admission, onward)     Ordered        05/07/22 1505  Inpatient Admission  Once                      Discharge Date: 05/09/22    Medical Problems             Resolved Problems  Date Reviewed: 5/8/2022    None                Consultations During Hospital Stay: Cardiology     Procedures Performed: None    Significant Findings / Test Results:     XR chest 1 view portable    Result Date: 5/8/2022  Impression: Mildly increased right basilar density, nonspecific  Differential includes atelectasis, edema, and pneumonia/aspiration  The study was marked in [facility] for immediate notification  Workstation performed: [id number]       Incidental Findings: None    Test Results Pending at Discharge (will require follow up): None     Outpatient Tests Requested: None    Reason for Admission: Shortness of Breath     Hospital Course:     Beth Bradford is a 81 y o  female With past medical history of combined systolic/diastolic congestive heart failure, COPD, atrial flutter, CKD, non-insulin-dependent type 2 diabetes presents the hospital with shortness of breath and dyspnea on exertion  Patient was started on IV diuretics with improvement in her volume status  Cardiology follow and titrated cardiac medications  She has had over 10 admissions over the last year for acute heart failure  The admitting physician discussed goals of care with patient and her daughter  They are requesting to return home on hospice  Patient establish with [facility] who will evaluate 5/10/2022  Please see above list of diagnoses and related plan for additional information  Condition at Discharge: stable     Discharge Day Visit / Exam:     Subjective:    Patient seen evaluated at bedside  Denies shortness of breath  She does have a mild frontal headache    Patient wishes to go home with hospice and not come back to the hospital     Vitals: Blood Pressure: 101/56 (05/09/22 1511)  Pulse: 63 (05/09/22 1511)  Temperature: 97 6 °F (36 4 °C) (05/09/22 1511)  Temp Source: Temporal (05/09/22 1511)  Respirations: 18 (05/09/22 1511)  Height: 4' 9" (144 8 cm) (per medical chart) (05/09/22 0903)  Weight - Scale: 58 9 kg (129 lb 12 8 oz) (05/09/22 0600)  SpO2: 97 % (05/09/22 1511)  Exam:   Physical Exam  Vitals reviewed  Constitutional:       General: She is not in acute distress  Appearance: She is well-developed  She is not ill-appearing, toxic-appearing or diaphoretic  HENT:      Head: Normocephalic and atraumatic  Mouth/Throat:      Mouth: Mucous membranes are moist       Pharynx: No oropharyngeal exudate  Eyes:      General: No scleral icterus  Extraocular Movements: Extraocular movements intact  Conjunctiva/sclera: Conjunctivae normal    Cardiovascular:      Rate and Rhythm: Normal rate and regular rhythm  Heart sounds: Normal heart sounds  Pulmonary:      Effort: Pulmonary effort is normal  No respiratory distress  Breath sounds: Normal breath sounds  No wheezing or rales  Abdominal:      General: There is no distension  Palpations: Abdomen is soft  Tenderness: There is no abdominal tenderness  There is no guarding or rebound  Musculoskeletal:         General: No swelling, tenderness or deformity  Skin:     General: Skin is warm and dry  Neurological:      General: No focal deficit present  Mental Status: She is alert  Mental status is at baseline  Psychiatric:         Mood and Affect: Mood normal          Behavior: Behavior normal          Thought Content: Thought content normal          Judgment: Judgment normal            Discharge instructions/Information to patient and family:   See after visit summary for information provided to patient and family  Provisions for Follow-Up Care:  See after visit summary for information related to follow-up care and any pertinent home health orders        Disposition:     Home with home hospice     Discharge Statement:  I spent 60 minutes discharging the patient  This time was spent on the day of discharge  I had direct contact with the patient on the day of discharge  Greater than 50% of the total time was spent examining patient, answering all patient questions, arranging and discussing plan of care with patient as well as directly providing post-discharge instructions  Additional time then spent on discharge activities  Discharge Medications:  See after visit summary for reconciled discharge medications provided to patient and family        ** Please Note: This note has been constructed using a voice recognition system **

## 2022-05-09 NOTE — DISCHARGE INSTRUCTIONS
Dear Vimal Hernandez,     It was our [facility] to care for you here at [address]  It is our hope that we were always able to exceed the expected standards for your care during your stay  You were hospitalized due to acute heart failure   You were cared for on the 4th floor under the service of Tonio Schumacher DO with the [facility]ist [facility] who covers for your primary care physician (PCP), Yo Rob MD, while you were hospitalized  If you have any questions or concerns related to this hospitalization, you may contact us at [phone]  For follow up as well as medication refills, we recommend that you follow up with your primary care physician  A registered nurse will reach out to you by phone within a few days after your discharge to answer any additional questions that you may have after going home  However, at this time we provide for you here, the most important instructions / recommendations at discharge:     · Notable Medication Adjustments -   · Losartan 25 mg daily  · Roxanol, Ativan  · Testing Required after Discharge -   · No further testing  · Incidental findings that Require Follow Up   · None  · Important Follow Up Information -   · Follow-up with [facility] hospice  · Please review this entire after visit summary as additional general instructions including medication list, appointments, activity, diet, any pertinent wound care, and other additional recommendations from your care team that may be provided for you        Sincerely,     Tonio Schumacher DO

## 2022-05-09 NOTE — UTILIZATION REVIEW
Inpatient Admission Authorization Request   NOTIFICATION OF INPATIENT ADMISSION/INPATIENT AUTHORIZATION REQUEST   SERVICING FACILITY:   [place] [address]  [address] [place] [address], [address]   Tax ID: [id number]  NPI: [id number]  Place of Service: Inpatient [facility]  [address]  Place of Service Code: 24     ATTENDING PROVIDER:  Attending Name and NPI#: Wilder Mckenzie [[id number]]  Address: [address] [place] [address], [address]   Phone: [phone]     UTILIZATION REVIEW CONTACT:  [facility] Utilization   Network Utilization Review Department  Phone: [phone]  Fax: [phone]  Email: [email or web address]     PHYSICIAN ADVISORY SERVICES:  FOR HAGL-WI-CCTZ REVIEW - MEDICAL NECESSITY DENIAL  Phone: [phone]  Fax: [phone]  Email: [email or web address]     TYPE OF REQUEST:  Inpatient Status     ADMISSION INFORMATION:  ADMISSION DATE/TIME: 5/7/22  3:05 PM  PATIENT DIAGNOSIS CODE/DESCRIPTION:  Acute respiratory distress [R06 03]  SOB (shortness of breath) [R06 02]  CHF exacerbation ([facility] ) [I50 9]  DISCHARGE DATE/TIME: No discharge date for patient encounter  IMPORTANT INFORMATION:  Please contact the [facility] directly with any questions or concerns regarding this request  Department voicemails are confidential     Send requests for admission clinical reviews, concurrent reviews, approvals, and administrative denials due to lack of clinical to fax [phone]

## 2022-05-09 NOTE — CASE MANAGEMENT
Case Management Discharge Planning Note    Patient name Meryle Ligas  Location [facility] /[address] 65-* MRN [id number]  : 1935 Date 2022       Current Admission Date: 2022  Current Admission Diagnosis:Acute respiratory failure with hypoxia [facility])   Patient Active Problem List    Diagnosis Date Noted    Acute [facility] failure with hypoxia [facility] ) 2022    Goals of care, counseling/discussion 2022    Influenza 2022    Hyperkalemia 2022    Stage 3b chronic kidney disease ([facility] ) 2022    Paroxysmal atrial fibrillation ([facility] ) 2022    Left knee pain 2022    Fatigue 2022    Hyperglycemia 2022    Anxiety and depression 2022    Atrial flutter ([facility] ) 2022    Other specified anemias 2022    Shortness of breath 2021    Hypertension 2021    Hypertensive heart disease with congestive heart failure ([facility] ) 2021    Gastroesophageal reflux disease without esophagitis 2021    Acute on chronic combined systolic and diastolic congestive heart failure ([facility] ) 2021    Vitamin B12 deficiency 2021    Vitamin D deficiency 2021    Status post implantation of automatic cardioverter/defibrillator (AICD) 2021    Obstructive sleep apnea syndrome 2021    BMI 27 0-27 9,adult 2021    COPD (chronic obstructive pulmonary disease) ([facility] ) 2021    Non-insulin dependent type 2 diabetes mellitus ([facility] ) 2021    Mixed hyperlipidemia 2021      LOS (days): 2  Geometric Mean LOS (GMLOS) (days):   Days to GMLOS:     OBJECTIVE:  Risk of Unplanned Readmission Score: 16         Current admission status: Inpatient   Preferred Pharmacy:   [facility] [address] [facility], [address] [place] [address]  Phone: [phone] Fax: [address] [facility] [place] [address]  Phone: [phone] [place]ax: [address] [place] [address] ,  [address] ,  [place] [address]  Phone: [phone] Fax: [phone]    Primary Care Provider: Vickie Avitia MD    Primary Insurance: MEDICARE MISC REPLACEMENT  Secondary Insurance:     DISCHARGE DETAILS:    Discharge planning discussed with[de-identified] pt/daughter Jessenia Lowe of Choice: Yes  Comments - Freedom of Choice: Patient's choice is to return to home with  hospice   hospice is accepting patient  CM contacted family/caregiver?: Yes (daughter Andrew Alvarez at bedside)  Were Treatment Team discharge recommendations reviewed with patient/caregiver?: Yes  Did patient/caregiver verbalize understanding of patient care needs?: Yes  Were patient/caregiver advised of the risks associated with not following Treatment Team discharge recommendations?: Yes    Contacts  Patient Contacts: Andrew Polanco  Reason/Outcome: Continuity of [address]         [facility]?: No    DME Referral Provided  Referral made for DME?: No    Other Referral/Resources/Interventions Provided:  Interventions: Hospice         Treatment Team Recommendation: Hospice  Discharge Destination Plan[de-identified] Hospice  Transport at Discharge : Family           ETA of Transport (Date): 05/09/22                          Additional Comments: Spoke with  hospice liason Niesha Gallegos to make her aware that patient will be D/Cing to home today and patient will not be shutting off ICD prior to D/C  Per Niesha Gallegos this is acceptable  Niesha Gallegos asking for meds to be called in to pharmacy for pt - Roxinol and Ativan  Daughter Andrew Alvarez would like the meds to be called into the [facility] here at this campus and not to [address] as this is closer for her  She reports that she will P/U Rx's for patient   Hospice liason from  will be out to see patient tomorrow at the home  Clyde Candelario at [phone] [facility] reports that they will get O2 set up for patient and complete the POLST/DNR  Clyde Candelario asking for Rx's for patient of Roxinol 20mg/ml solution/concentration- Give 0 25ml q2hrs PRN for pain or dyspnea  Ativan PO 0 5mg q4hrs -15 tabs to be called into pharmacy and picked up by daughter   Daughter is agreeable

## 2022-05-09 NOTE — UTILIZATION REVIEW
Initial Clinical Review    Admission: Date/Time/Statement:   Admission Orders (From admission, onward)     Ordered        05/07/22 1505  Inpatient Admission  Once                      Orders Placed This Encounter   Procedures    Inpatient Admission     Standing Status:   Standing     Number of Occurrences:   1     Order Specific Question:   Level of Care     Answer:   Med Surg [16]     Order Specific Question:   Estimated length of stay     Answer:   More than 2 Midnights     Order Specific Question:   Certification     Answer:   I certify that inpatient services are medically necessary for this patient for a duration of greater than two midnights  See H&P and MD Progress Notes for additional information about the patient's course of treatment  ED Arrival Information     Expected Arrival Acuity    - 5/7/2022 09:28 Emergent         Means of arrival Escorted by Service Admission type    [facility] Emergency         Arrival complaint    vomiting chest pain        Chief Complaint   Patient presents with    Shortness of Breath     SOB and headache starting last night  patient with hx of COPD and heart failure     Initial Presentation: 81 y o  female presents to the ED from home with c/o SOB at rest and with exertion, easy fatiguability, poor appetite, daily headaches and overall decline in function with 10 hospitalizations in the last year  Family has discussed this and they are agreeable to her being d/c on hospice  PMH:  CHF, atrial flutter, COPD, permanent ICD, recent admission for respiratory failure, recently started on Amiodarone and Eliquis for A flutter  In the ED on exam pt is in distress and needs full face mask and BIPAP, has coarse breath sounds, irregular rhythm, A&O x 3  She is treated with IV Lasix 40 mg, was able to be placed on NC oxygen, nebs  Imaging shows nonspecific R basilar density    She is admitted to INPATIENT status with Acute respiratory failure w/ hypoxia - oxygen  Acute on chronic combined CHF - IV Lasix 40 mg BID, Toprol XL, cardio consult  A flutter - continue Amiodarone and Eliquis  Stage 3B CKD - monitor BMP  NIDDM - SSi cover  Goals of care - home hospice consult  5/7 Cardio Consult - decomp heart failure with reduced EF, unspecified cardiomyopahty, persistent A flutter, COPD, CKD, HTN and HTN heart disease, dyslipidemia - IV Lasix 40 mg BID, follow renal function and lytes,  May add Entresto 5/9 depending on renal function and K, continue COPD therapy and HF precautions  Date: 5/8   Day 2:   Pt feels comfortable today  She is able to converse, irregular rhythm, has scattered bibasilar rales, A&O x 3, plan to transition to oral Lasix on 5/9, adding Entresto 24-26 mg half pill daily beginning on evening 5/8, continue amiodarone, apixaban, metoprolol succinate and isosorbide mononitrate, follow labs, poss d/c 5/9        ED Triage Vitals   Temperature Pulse Respirations Blood Pressure SpO2   05/07/22 0938 05/07/22 0938 05/07/22 0938 05/07/22 0938 05/07/22 0938   98 1 °F (36 7 °C) 102 (!) 28 139/78 97 %      Temp Source Heart Rate Source Patient Position - Orthostatic VS BP Location FiO2 (%)   05/07/22 0938 05/07/22 0938 05/07/22 1256 05/07/22 1256 --   Oral Monitor Lying Right arm       Pain Score       05/07/22 1256       No Pain          Wt Readings from Last 1 Encounters:   05/09/22 58 9 kg (129 lb 12 8 oz)     Additional Vital Signs:   05/09/22 0700 97 °F (36 1 °C) Abnormal  83 20 124/67 -- 98 % -- None (Room air) -- Sitting   05/08/22 2251 97 1 °F (36 2 °C) Abnormal  81 20 118/64 80 95 % -- None (Room air) -- Lying   05/08/22 1524 97 3 °F (36 3 °C) Abnormal  66 20 101/58 74 97 % -- None (Room air) -- Lying   05/08/22 0700 97 4 °F (36 3 °C) Abnormal  80 20 111/59 -- 99 % -- None (Room air) -- Lying   05/07/22 2313 97 °F (36 1 °C) Abnormal  82 20 112/58 80 97 % -- -- -- Lying   05/07/22 1600 97 5 °F (36 4 °C) 89 26 Abnormal  114/64 -- 97 % -- None (Room air) -- Lying   05/07/22 1438 -- 97 18 143/79 -- 95 % -- None (Room air) -- Lying   05/07/22 1256 -- 98 18 137/81 -- 98 % 2 L/min Nasal cannula -- Lying   05/07/22 1044 -- -- -- -- -- 97 % -- -- -- --   05/07/22 1035 -- -- -- -- -- -- -- BiPAP -- --   05/07/22 1030 -- -- -- -- -- -- -- -- Full face mask --     Pertinent Labs/Diagnostic Test Results:     5/7 ECG - Sinus tachycardia with Atrial sensed  Ventricular-paced rhythm   Prolonged WI interval  Abnormal ECG    XR chest 1 view portable   Final Result by Wendy Mckeon MD (05/08 9671)      Mildly increased right basilar density, nonspecific  Differential includes atelectasis, edema, and pneumonia/aspiration       Results from last 7 days   Lab Units 05/08/22  0559 05/07/22  1114   WBC Thousand/uL 6 98 10 84*   HEMOGLOBIN g/dL 10 3* 11 9   HEMATOCRIT % 33 5* 39 0   PLATELETS Thousands/uL 177 182   NEUTROS ABS Thousands/µL  --  9 02*         Results from last 7 days   Lab Units 05/09/22  0525 05/08/22  0559 05/07/22  1114   SODIUM mmol/L 140 141 139   POTASSIUM mmol/L 3 7 4 1 4 3   CHLORIDE mmol/L 100 102 102   CO2 mmol/L 34* 33* 30   ANION GAP mmol/L 6 6 7   BUN mg/dL 24 24 23   CREATININE mg/dL 1 21 1 18 1 20   EGFR ml/min/1 73sq m 40 42 41   CALCIUM mg/dL 8 4 8 3 8 8   MAGNESIUM mg/dL  --   --  1 9             Results from last 7 days   Lab Units 05/09/22  0525 05/08/22  0559 05/07/22  1114   GLUCOSE RANDOM mg/dL 106 103 115     Results from last 7 days   Lab Units 05/07/22  1723 05/07/22  1358 05/07/22  1121   HS TNI 0HR ng/L  --   --  26   HS TNI 2HR ng/L  --  23  --    HSTNI D2 ng/L  --  -3  --    HS TNI 4HR ng/L 30  --   --    HSTNI D4 ng/L 4  --   --      Results from last 7 days   Lab Units 05/07/22  1114   NT-PRO BNP pg/mL 7,413*     ED Treatment:   Medication Administration from 05/07/2022 0928 to 05/07/2022 1530    Date/Time Order Dose Route Action   05/07/2022 1043 ipratropium (ATROVENT) 0 02 % inhalation solution 1 mg 1 mg Nebulization Given 05/07/2022 1042 albuterol inhalation solution 10 mg 10 mg Nebulization Given   05/07/2022 1433 furosemide (LASIX) injection 40 mg 40 mg Intravenous Given        Past Medical History:   Diagnosis Date    Anxiety and depression 1/26/2022    Atrial flutter ([place] [id number] ) 1/26/2022    BMI 27 0-27 9,adult 12/16/2021    CHF (congestive heart failure) [facility]     Congestive heart failure (CHF) ([place] [id number] ) 12/16/2021    COPD (chronic obstructive pulmonary disease) [facility]     COPD (chronic obstructive pulmonary disease) ([place] [id number] ) 12/16/2021    Diabetes 1 5, managed as type 2 ([place] [id number] )     Fatigue 4/1/2022    Gastroesophageal reflux disease without esophagitis 12/16/2021    High blood pressure     Hyperglycemia 4/1/2022    Hypertension 12/16/2021    Hypertensive heart disease with congestive heart failure ([place] [id number] ) 12/16/2021    Left knee pain 4/1/2022    Obstructive sleep apnea syndrome 12/16/2021    Other specified anemias 1/26/2022    Paroxysmal atrial fibrillation ([place] [id number] ) 4/1/2022    Status post implantation of automatic cardioverter/defibrillator (AICD) 12/16/2021    Vitamin B12 deficiency 12/16/2021    Vitamin D deficiency 12/16/2021     Present on Admission:   Acute on chronic combined systolic and diastolic congestive heart failure (HCC)   COPD (chronic obstructive pulmonary disease) [facility]   Atrial flutter ([facility]   Stage 3b chronic kidney disease ([place] [address] )   Non-insulin dependent type 2 diabetes mellitus ([place] [address] )      Admitting Diagnosis: Acute respiratory distress [R06 03]  SOB (shortness of breath) [R06 02]  CHF exacerbation (HCC) [I50 9]  Age/Sex: 81 y o  female  Admission Orders:  Scheduled Medications:  amiodarone, 200 mg, Oral, Daily  apixaban, 2 5 mg, Oral, BID  escitalopram, 10 mg, Oral, Daily  furosemide, 40 mg, Oral, BID (diuretic)  isosorbide mononitrate, 30 mg, Oral, Daily  metoprolol succinate, 50 mg, Oral, Daily  sacubitril-valsartan, 0 5 tablet, Oral, BID      Continuous IV Infusions:     PRN Meds:  acetaminophen, 650 mg, Oral, Q6H PRN -x 1 5/8  albuterol, 2 puff, Inhalation, Q6H PRN  bisacodyl, 5 mg, Oral, Daily PRN    Daily wt  Strict I&O  Fluid restriction  BIPAP  IP CONSULT TO NUTRITION SERVICES  IP CONSULT TO CARDIOLOGY  IP CONSULT TO HOSPICE  IP CONSULT TO CASE MANAGEMENT    Network Utilization Review Department  ATTENTION: Please call with any questions or concerns to [phone] and carefully listen to the prompts so that you are directed to the right person  All voicemails are confidential   Shady Alexander all requests for admission clinical reviews, approved or denied determinations and any other requests to dedicated fax number below belonging to the campus where the patient is receiving treatment   List of dedicated fax numbers for the Facilities:  [address] DENIALS (Administrative/Medical Necessity) [phone]   [address] (Maternity/NICU/Pediatrics) [phone]   [address]  [address] [place] [address] [id number] [phone] [facility] [phone]

## 2022-05-09 NOTE — PROGRESS NOTES
CARDIOLOGY INPATIENT FOLLOW-UP PROGRESS NOTE  *-*-*-*-*-*-*-*-*-*-*-*-*-*-*-*-*-*-*-*-*-*-*-*-*-*-*-*-*-*-*-*-*-*-*-*-*-*-*-*-*-*-*-*-*-*-*-*-*-*-*-*-*-*-  VCFPCUEGY DATE: 05/09/22 10:35 AM   AUTHOR: Linda Greenberg MD  PATIENT: Jany Aviles   1935    [id number]   81 y o    female  INPATIENT HOSPITALIST PHYSICIAN: Radha Aranda*  DATE OF ADMISSION: 5/7/2022 10:07 AM; LENGTH OF STAY: 2 days  *-*-*-*-*-*-*-*-*-*-*-*-*-*-*-*-*-*-*-*-*-*-*-*-*-*-*-*-*-*-*-*-*-*-*-*-*-*-*-*-*-*-*-*-*-*-*-*-*-*-*-*-*-*-    CARDIOLOGY ASSESSMENT:  1  Decompensated heart failure, heart failure with reduced ejection fraction  2  Unspecified cardiomyopathy  3  Persistent atrial flutter  4  COPD  5  CKD, stage IIIB  6  Primary hypertension and hypertensive heart disease  7  Dyslipidemia      *-*-*-*-*-*-*-*-*-*-*-*-*-*-*-*-*-*-*-*-*-*-*-*-*-*-*-*-*-*-*-*-*-*-*-*-*-*-*-*-*-*-*-*-*-*-*-*-*-*-*-*-*-*-  CURRENT CARDIAC MEDICATIONS:  Amiodarone 200 mg daily, apixaban 2 5 mg twice daily, furosemide 40 mg IV b i d , isosorbide mononitrate 30 mg daily, metoprolol succinate 50 mg once daily  PERTINENT LABS AND OTHER INVESTIGATIONS:  Creatinine 1 21, potassium 3 7, sodium 140, GFR 40    Peak HS troponin was 30, NT pro BNP was 7004 her and 13    ECHOCARDIOGRAM AND OTHER CARDIAC TESTS RESULTS:  Echocardiogram in December 2021 which showed mild to moderately dilated left ventricular cavity with severely reduced left ventricular systolic function with global hypokinesis, EF 30%, mild to moderate left atrial cavity enlargement, normal RV size and function, normal right atrial size, moderately thickened aortic valve with trace aortic valve regurgitation, mitral annular calcification with mild-to-moderate mitral valve regurgitation, moderate tricuspid valve regurgitation, trace pulmonic valve regurgitation and no pericardial effusion  *-*-*-*-*-*-*-*-*-*-*-*-*-*-*-*-*-*-*-*-*-*-*-*-*-*-*-*-*-*-*-*-*-*-*-*-*-*-*-*-*-*-*-*-*-*-*-*-*-*-*-*-*-*-     Patient is overall doing well with resolution of symptoms and stable heart rate and blood pressure  Family wants to take her home with home hospice as she does not want to come back to hospital   From a cardiac perspective patient is stable overall and can be managed in outpatient setting  PLAN:  -- at this time agree with transition to oral diuretic of furosemide 40 mg daily  Would recommend continuing the metoprolol succinate and isosorbide  It would be reasonable to change her back to losartan instead of initiating Entresto as [facility] may cause more symptomatic hypotension in the future and may need closer monitoring than losartan  *-*-*-*-*-*-*-*-*-*-*-*-*-*-*-*-*-*-*-*-*-*-*-*-*-*-*-*-*-*-*-*-*-*-*-*-*-*-*-*-*-*-*-*-*-*-*-*-*-*-*-*-*-*-  INTERVAL CHANGES / HISTORY OF PRESENT ILLNESS:   No acute events overnight  Patient denies any chest pain shortness of breath dizziness or lightheadedness or palpitations  *-*-*-*-*-*-*-*-*-*-*-*-*-*-*-*-*-*-*-*-*-*-*-*-*-*-*-*-*-*-*-*-*-*-*-*-*-*-*-*-*-*-*-*-*-*-*-*-*-*-*-*-*-*    PERTINENT REVIEW OF SYMPTOMS:  As noted above in HPI    *-*-*-*-*-*-*-*-*-*-*-*-*-*-*-*-*-*-*-*-*-*-*-*-*-*-*-*-*-*-*-*-*-*-*-*-*-*-*-*-*-*-*-*-*-*-*-*-*-*-*-*-*-*-  VITAL SIGNS:  Vitals:    22 2251 22 0600 22 0700 22 0903   BP: 118/64  124/67    BP Location: Right arm  Right arm    Pulse: 81  83    Resp: 20  20    Temp: (!) 97 1 °F (36 2 °C)  (!) 97 °F (36 1 °C)    TempSrc: Temporal  Temporal    SpO2: 95%  98%    Weight:  58 9 kg (129 lb 12 8 oz)     Height:    4' 9" (1 448 m)      Temp (24hrs), Av 1 °F (36 2 °C), Min:97 °F (36 1 °C), Max:97 3 °F (36 3 °C)  Current: Temperature: (!) 97 °F (36 1 °C)    Weight    22 0600 22 0600   Weight: 60 kg (132 lb 4 4 oz) 58 9 kg (129 lb 12 8 oz)      Body mass index is 28 09 kg/m²   Wt Readings from Last 3 Encounters:   05/09/22 58 9 kg (129 lb 12 8 oz)   04/27/22 59 4 kg (131 lb)   04/20/22 60 1 kg (132 lb 7 9 oz)      Intake/Output Summary (Last 24 hours) at 5/9/2022 1035  Last data filed at 5/9/2022 0840  Gross per 24 hour   Intake 640 ml   Output 2250 ml   Net -1610 ml          *-*-*-*-*-*-*-*-*-*-*-*-*-*-*-*-*-*-*-*-*-*-*-*-*-*-*-*-*-*-*-*-*-*-*-*-*-*-*-*-*-*-*-*-*-*-*-*-*-*-*-*-*-*-  PHYSICAL EXAM:  General Appearance:    Alert, cooperative, in no respiratory distress, appears stated age   Head, Eyes, ENT:    No obvious abnormality, moist mucous mebranes  Neck:   Supple, no carotid bruit or JVD   Back:     Symmetric, no curvature  Lungs:     Respirations unlabored  Clear to auscultation bilaterally,    Chest wall:    No tenderness or deformity   Heart:    Regular rate and rhythm, S1 and S2 normal, no murmur, rub  or gallop  Abdomen:     Soft, non-tender, No obvious masses, or organomegaly   Extremities:   Extremities warm, no cyanosis or edema    Skin:   Skin color, texture, turgor normal, no rashes or lesions     *-*-*-*-*-*-*-*-*-*-*-*-*-*-*-*-*-*-*-*-*-*-*-*-*-*-*-*-*-*-*-*-*-*-*-*-*-*-*-*-*-*-*-*-*-*-*-*-*-*-*-*-*-*-  TELEMETRY, LAST ECG:  Telemetry reviewed    Not on telemetry Results for orders placed or performed during the hospital encounter of 05/07/22   ECG 12 lead   Result Value    Ventricular Rate 107    Atrial Rate 108    ID Interval     QRSD Interval 98    QT Interval 362    QTC Interval 483    P Axis     QRS Axis -36    T Wave Axis 137    Narrative    Sinus tachycardia with Atrial sensed  Ventricular-paced rhythm   Prolonged ID interval  Abnormal ECG  When compared with ECG of 20-APR-2022 18:56,  Ventricualr rate has increased      Confirmed by Kamran Mccoy ([id number]) on 5/7/2022 6:45:04 PM      *-*-*-*-*-*-*-*-*-*-*-*-*-*-*-*-*-*-*-*-*-*-*-*-*-*-*-*-*-*-*-*-*-*-*-*-*-*-*-*-*-*-*-*-*-*-*-*-*-*-*-*-*-*-      CURRENT SCHEDULED MEDICATIONS:    Current Facility-Administered Medications:     acetaminophen (TYLENOL) tablet 650 mg, 650 mg, Oral, Q6H PRN, Day Velasco PA-C, 650 mg at 05/08/22 2222    albuterol (PROVENTIL HFA,VENTOLIN HFA) inhaler 2 puff, 2 puff, Inhalation, Q6H PRN, Merlene Diaz MD    amiodarone tablet 200 mg, 200 mg, Oral, Daily, Merlene Diaz MD, 200 mg at 05/09/22 0840    apixaban (ELIQUIS) tablet 2 5 mg, 2 5 mg, Oral, BID, Merlene Diaz MD, 2 5 mg at 05/09/22 0840    bisacodyl (DULCOLAX) EC tablet 5 mg, 5 mg, Oral, Daily PRN, Judith Yo MD    escitalopram (LEXAPRO) tablet 10 mg, 10 mg, Oral, Daily, Merlene Diaz MD, 10 mg at 05/09/22 0840    furosemide (LASIX) tablet 40 mg, 40 mg, Oral, BID (diuretic), Kamran Mccoy MD, 40 mg at 05/09/22 0840    isosorbide mononitrate (IMDUR) 24 hr tablet 30 mg, 30 mg, Oral, Daily, Merlene Diaz MD, 30 mg at 05/09/22 0840    metoprolol succinate (TOPROL-XL) 24 hr tablet 50 mg, 50 mg, Oral, Daily, Merlene Diaz MD, 50 mg at 05/09/22 0840    sacubitril-valsartan (ENTRESTO) 24-26 MG per tablet 0 5 tablet, 0 5 tablet, Oral, BID, Kamran Mccoy MD, 0 5 tablet at 05/09/22 0840 ALLERGIES:  No Known Allergies     *-*-*-*-*-*-*-*-*-*-*-*-*-*-*-*-*-*-*-*-*-*-*-*-*-*-*-*-*-*-*-*-*-*-*-*-*-*-*-*-*-*-*-*-*-*-*-*-*-*-*-*-*-*  LABORATORY DATA:  I have personally reviewed pertinent labs      CMP:  Results from last 7 days   Lab Units 05/09/22  0525 05/08/22  0559 05/07/22  1114   SODIUM mmol/L 140 141 139   POTASSIUM mmol/L 3 7 4 1 4 3   CHLORIDE mmol/L 100 102 102   CO2 mmol/L 34* 33* 30   BUN mg/dL 24 24 23   CREATININE mg/dL 1 21 1 18 1 20   CALCIUM mg/dL 8 4 8 3 8 8        Results from last 7 days   Lab Units 05/07/22  1114   MAGNESIUM mg/dL 1 9        Cardiac Profile:   Results from last 7 days   Lab Units 05/07/22  1114   NT-PRO BNP pg/mL 7,413*                CBC:   Results from last 7 days   Lab Units 05/08/22  0559 05/07/22  1114 WBC Thousand/uL 6 98 10 84*   HEMOGLOBIN g/dL 10 3* 11 9   HEMATOCRIT % 33 5* 39 0   PLATELETS Thousands/uL 177 182     PT/INR: No results found for: PT, INR, Microbiology:          *-*-*-*-*-*-*-*-*-*-*-*-*-*-*-*-*-*-*-*-*-*-*-*-*-*-*-*-*-*-*-*-*-*-*-*-*-*-*-*-*-*-*-*-*-*-*-*-*-*-*-*-*-*-  IMAGING STUDIES REPORTS: Imaging studies results reviewed    No valid procedures specified  No Chest XR results available for this patient  *-*-*-*-*-*-*-*-*-*-*-*-*-*-*-*-*-*-*-*-*-*-*-*-*-*-*-*-*-*-*-*-*-*-*-*-*-*-*-*-*-*-*-*-*-*-*-*-*-*-*-*-*-*-    No results found for this or any previous visit  No results found for this or any previous visit  No results found for this or any previous visit  No results found for this or any previous visit         *-*-*-*-*-*-*-*-*-*-*-*-*-*-*-*-*-*-*-*-*-*-*-*-*-*-*-*-*-*-*-*-*-*-*-*-*-*-*-*-*-*-*-*-*-*-*-*-*-*-*-*-*-*-  SIGNATURES:   @ERE@   Linda Greenberg MD

## 2022-05-09 NOTE — CASE MANAGEMENT
Case Management Progress Note    Patient name Karmen Beal  Location [facility] 4 /E4  0043-* MRN [id number]  : 1935 Date 2022       LOS (days): 2  Geometric Mean LOS (GMLOS) (days):   Days to GMLOS:        OBJECTIVE:        Current admission status: Inpatient  Preferred Pharmacy:   [facility] [address] [facility], [address] [place] [address] [address] [facility] [place] [address] [place] [address]  Phone: [phone] Fax: [address] [facility] [address]  Phone: [phone] Fax: [phone]    Primary Care Provider: Rl Bland MD    Primary Insurance: MEDICARE [facility]  Secondary Insurance:     PROGRESS NOTE:    This CM confirmed with Jones S Debbie Simpson listed in pt chart that she does have Rx coverage  Pharmacy reports patient has a medicaid [place] and had last filled Rx there on 22

## 2022-05-09 NOTE — ASSESSMENT & PLAN NOTE
· Goals of care discussion was done with the patient and her daughter by Dr Franki Ocampo and myself  · She has been hospitalized more than 10 times this past year  · They would like her to go home with home hospice and not be readmitted should her condition deteriorate  · See ACP note  · [facility] will be taking over patient's care and evaluate 5/10/2022 in their home  Patient does not wish to turn offer ICD at this time  This can be addressed with [facility]  · Discharged on Roxanol solution and p r n   Ativan

## 2022-05-09 NOTE — ASSESSMENT & PLAN NOTE
· 27-year-old female from [facility] with a history of cardiomyopathy likely ischemic status post AICD implantation in 2025 admitted with acute on chronic systolic heart failure  · Known history of congestive heart failure with EF 30% status post ICD  · Patient improved with IV diuresis  · Cardiology evaluated during hospital stay  · Continue metoprolol succinate 50 mg daily, Imdur 30 mg daily  · Initially patient was started on Entresto, but given the patient's wish to return home on hospice and need for frequent laboratory monitoring this was discontinued  Patient was started on losartan 25 mg daily

## 2022-05-09 NOTE — ASSESSMENT & PLAN NOTE
· Multifactorial secondary to acute on chronic combined congestive heart failure, atrial flutter, COPD, obesity, recent influenza, deconditioning  · Resolved

## 2022-05-10 NOTE — UTILIZATION REVIEW
Notification of Discharge   This is a Notification of Discharge from our facility [address]  Please be advised that this patient has been discharge from our facility  Below you will find the admission and discharge date and time including the patients disposition  UTILIZATION REVIEW CONTACT:  Chidi Christianson  Utilization   Network Utilization Review Department  Phone: [phone] x carefully listen to the prompts  All voicemails are confidential   Email: [email or web address]     PHYSICIAN ADVISORY SERVICES:  FOR [id number] REVIEW - MEDICAL NECESSITY DENIAL  Phone: [phone]  Fax: [phone]  Email: [email or web address]     PRESENTATION DATE: 5/7/2022 10:07 AM    INPATIENT ADMISSION DATE: 5/7/22  3:05 PM   DISCHARGE DATE: 5/9/2022  4:46 PM  DIS[facility] [address] INFORMATION:  Send all requests for admission clinical reviews, approved or denied determinations and any other requests to dedicated fax number below belonging to the campus where the patient is receiving treatment   List of dedicated fax numbers:  [address] DENIALS (Administrative/Medical Necessity) [phone]   1000  [facility] (Maternity/NICU/Pediatrics) [phone]   [facility] [phone] [address] [facility] [phone]   [address] [place] [address] [phone]   [address] [phone]   [facility]  [phone]   [address] [place] [address], [facility]  [address] [phone]

## 2022-05-12 NOTE — TELEPHONE ENCOUNTER
Patient's daughter, Luciana Ortega called in requesting to speak with patient's cardiologist  She has questions about patient's defibrillator and possibly turning if off  Patient has had two recent hospitalizations and is discussing hospice with her daughters  Please follow up with Rere

## 2022-05-13 NOTE — TELEPHONE ENCOUNTER
Spoke to patient's daughter  She had another hospitalization  They have discussed hospice, and she is with [facility] home hospice  They had discussed deactivating the defibrilator function, but patient was concerned that doing so would cause her to die immediately  I spoke with Joao Prescott, explained this is not the case, and if she has transferred to hospice level I agree with deactivating tachy therapies  Spoke with [facility] hospiceRichy is her nurse  They will arrange for this  Orders can be written by their team, and they will contact [employer] for reprogramming

## 2022-05-13 NOTE — UTILIZATION REVIEW
Initial Clinical Review    Admission: Date/Time/Statement:   Admission Orders (From admission, onward)     Ordered        05/07/22 1505  Inpatient Admission  Once                      Orders Placed This Encounter   Procedures    Inpatient Admission     Standing Status:   Standing     Number of Occurrences:   1     Order Specific Question:   Level of Care     Answer:   Med Surg [16]     Order Specific Question:   Estimated length of stay     Answer:   More than 2 Midnights     Order Specific Question:   Certification     Answer:   I certify that inpatient services are medically necessary for this patient for a duration of greater than two midnights  See H&P and MD Progress Notes for additional information about the patient's course of treatment  ED Arrival Information     Expected Arrival Acuity    - 5/7/2022 09:28 Emergent         Means of arrival Escorted by Service Admission type    [facility] Emergency         Arrival complaint    vomiting chest pain        Chief Complaint   Patient presents with    Shortness of Breath     SOB and headache starting last night  patient with hx of COPD and heart failure     Initial Presentation: 81 y o  female presents to the ED from home with c/o SOB at rest and with exertion, easy fatiguability, poor appetite, daily headaches and overall decline in function with 10 hospitalizations in the last year  Family has discussed this and they are agreeable to her being d/c on hospice  PMH:  CHF, atrial flutter, COPD, permanent ICD, recent admission for respiratory failure, recently started on Amiodarone and Eliquis for A flutter  In the ED on exam pt is in distress and needs full face mask and BIPAP, has coarse breath sounds, irregular rhythm, A&O x 3  She is treated with IV Lasix 40 mg, was able to be placed on NC oxygen, nebs  Imaging shows nonspecific R basilar density    She is admitted to INPATIENT status with Acute respiratory failure w/ hypoxia - oxygen  Acute on chronic combined CHF - IV Lasix 40 mg BID, Toprol XL, cardio consult  A flutter - continue Amiodarone and Eliquis  Stage 3B CKD - monitor BMP  NIDDM - SSi cover  Goals of care - home hospice consult  5/7 Cardio Consult - decomp heart failure with reduced EF, unspecified cardiomyopahty, persistent A flutter, COPD, CKD, HTN and HTN heart disease, dyslipidemia - IV Lasix 40 mg BID, follow renal function and lytes,  May add Entresto 5/9 depending on renal function and K, continue COPD therapy and HF precautions  Date: 5/8   Day 2:   Pt feels comfortable today  She is able to converse, irregular rhythm, has scattered bibasilar rales, A&O x 3, plan to transition to oral Lasix on 5/9, adding Entresto 24-26 mg half pill daily beginning on evening 5/8, continue amiodarone, apixaban, metoprolol succinate and isosorbide mononitrate, follow labs, poss d/c 5/9        ED Triage Vitals   Temperature Pulse Respirations Blood Pressure SpO2   05/07/22 0938 05/07/22 0938 05/07/22 0938 05/07/22 0938 05/07/22 0938   98 1 °F (36 7 °C) 102 (!) 28 139/78 97 %      Temp Source Heart Rate Source Patient Position - Orthostatic VS BP Location FiO2 (%)   05/07/22 0938 05/07/22 0938 05/07/22 1256 05/07/22 1256 --   Oral Monitor Lying Right arm       Pain Score       05/07/22 1256       No Pain          Wt Readings from Last 1 Encounters:   05/09/22 58 9 kg (129 lb 12 8 oz)     Additional Vital Signs:   05/09/22 0700 97 °F (36 1 °C) Abnormal  83 20 124/67 -- 98 % -- None (Room air) -- Sitting   05/08/22 2251 97 1 °F (36 2 °C) Abnormal  81 20 118/64 80 95 % -- None (Room air) -- Lying   05/08/22 1524 97 3 °F (36 3 °C) Abnormal  66 20 101/58 74 97 % -- None (Room air) -- Lying   05/08/22 0700 97 4 °F (36 3 °C) Abnormal  80 20 111/59 -- 99 % -- None (Room air) -- Lying   05/07/22 2313 97 °F (36 1 °C) Abnormal  82 20 112/58 80 97 % -- -- -- Lying   05/07/22 1600 97 5 °F (36 4 °C) 89 26 Abnormal  114/64 -- 97 % -- None (Room air) -- Lying   05/07/22 1438 -- 97 18 143/79 -- 95 % -- None (Room air) -- Lying   05/07/22 1256 -- 98 18 137/81 -- 98 % 2 L/min Nasal cannula -- Lying   05/07/22 1044 -- -- -- -- -- 97 % -- -- -- --   05/07/22 1035 -- -- -- -- -- -- -- BiPAP -- --   05/07/22 1030 -- -- -- -- -- -- -- -- Full face mask --     Pertinent Labs/Diagnostic Test Results:     5/7 ECG - Sinus tachycardia with Atrial sensed  Ventricular-paced rhythm   Prolonged TN interval  Abnormal ECG    XR chest 1 view portable   Final Result by Abigail Waddell MD (05/08 5853)      Mildly increased right basilar density, nonspecific  Differential includes atelectasis, edema, and pneumonia/aspiration       Results from last 7 days   Lab Units 05/08/22  0559 05/07/22  1114   WBC Thousand/uL 6 98 10 84*   HEMOGLOBIN g/dL 10 3* 11 9   HEMATOCRIT % 33 5* 39 0   PLATELETS Thousands/uL 177 182   NEUTROS ABS Thousands/µL  --  9 02*         Results from last 7 days   Lab Units 05/09/22  0525 05/08/22  0559 05/07/22  1114   SODIUM mmol/L 140 141 139   POTASSIUM mmol/L 3 7 4 1 4 3   CHLORIDE mmol/L 100 102 102   CO2 mmol/L 34* 33* 30   ANION GAP mmol/L 6 6 7   BUN mg/dL 24 24 23   CREATININE mg/dL 1 21 1 18 1 20   EGFR ml/min/1 73sq m 40 42 41   CALCIUM mg/dL 8 4 8 3 8 8   MAGNESIUM mg/dL  --   --  1 9             Results from last 7 days   Lab Units 05/09/22  0525 05/08/22  0559 05/07/22  1114   GLUCOSE RANDOM mg/dL 106 103 115     Results from last 7 days   Lab Units 05/07/22  1723 05/07/22  1358 05/07/22  1121   HS TNI 0HR ng/L  --   --  26   HS TNI 2HR ng/L  --  23  --    HSTNI D2 ng/L  --  -3  --    HS TNI 4HR ng/L 30  --   --    HSTNI D4 ng/L 4  --   --      Results from last 7 days   Lab Units 05/07/22  1114   NT-PRO BNP pg/mL 7,413*     ED Treatment:   Medication Administration from 05/07/2022 0928 to 05/07/2022 1530    Date/Time Order Dose Route Action   05/07/2022 1043 ipratropium (ATROVENT) 0 02 % inhalation solution 1 mg 1 mg Nebulization Given 05/07/2022 1042 albuterol inhalation solution 10 mg 10 mg Nebulization Given   05/07/2022 1433 furosemide (LASIX) injection 40 mg 40 mg Intravenous Given        Past Medical History:   Diagnosis Date    Anxiety and depression 1/26/2022    Atrial flutter ([place] [address] ) 1/26/2022    BMI 27 0-27 9,adult 12/16/2021    [facility]est[facility] failure) [facility]     Congestive heart failure (CHF) ([place] [address] ) 12/16/2021    COPD (chronic obstructive pulmonary disease) ([facility])     COPD (chronic obstructive pulmonary disease) ([place] [address] ) 12/16/2021    Diabetes 1 5, managed as type 2 ([place] [address] )     Fatigue 4/1/2022    Gastroesophageal reflux disease without esophagitis 12/16/2021    High blood pressure     Hyperglycemia 4/1/2022    Hypertension 12/16/2021    Hypertensive heart disease with congestive heart failure ([place] [address] ) 12/16/2021    Left knee pain 4/1/2022    Obstructive sleep apnea syndrome 12/16/2021    Other specified anemias 1/26/2022    Paroxysmal atrial fibrillation ([place] [address] ) 4/1/2022    Status post implantation of automatic cardioverter/defibrillator (AICD) 12/16/2021    Vitamin B12 deficiency 12/16/2021    Vitamin D deficiency 12/16/2021     Present on Admission:   Acute on chronic combined systolic and diastolic congestive heart failure (HCC)   COPD (chronic obstructive pulmonary disease) ([facility])   Atrial flutter ([facility])   Stage 3b chronic kidney disease ([place] [address] )   Non-insulin dependent type 2 diabetes mellitus ([place] [address] )      Admitting Diagnosis: Acute respiratory distress [R06 03]  SOB (shortness of breath) [R06 02]  CHF exacerbation ([place] [address] ) [I50 9]  Age/Sex: 81 y o  female  Admission Orders:  Scheduled Medications:  No current facility-administered medications for this encounter  Continuous IV Infusions:  No current facility-administered medications for this encounter      PRN Meds:  acetaminophen, 650 mg, Oral, Q6H PRN -x 1 5/8  albuterol, 2 puff, Inhalation, Q6H PRN  bisacodyl, 5 mg, Oral, Daily PRN    Daily wt  Strict I&O  Fluid restriction  BIPAP  IP CONSULT TO NUTRITION SERVICES  IP CONSULT TO CARDIOLOGY  IP CONSULT TO CASE MANAGEMENT    [facility]  Discharge- Esperanza Nance 1935, 81 y o  female MRN: [id number]  Unit/Bed#: E4 -01 Encounter: [id number]  Primary Care Provider: Berry Mitchell MD   Date and time admitted to hospital: 5/7/2022 10:07 AM     Acute on chronic combined systolic and diastolic congestive heart failure ([address] )  Assessment & Plan  · 36-year-old female from [facility] with a history of cardiomyopathy likely ischemic status post AICD implantation in 2025 admitted with acute on chronic systolic heart failure  · Known history of congestive heart failure with EF 30% status post ICD  · Patient improved with IV diuresis  · Cardiology evaluated during hospital stay  ? Continue metoprolol succinate 50 mg daily, Imdur 30 mg daily  ? Initially patient was started on Entresto, but given the patient's wish to return home on hospice and need for frequent laboratory monitoring this was discontinued  Patient was started on losartan 25 mg daily      Goals of care, counseling/discussion  Assessment & Plan  · Goals of care discussion was done with the patient and her daughter by Dr Shilpa Palacios and myself  · She has been hospitalized more than 10 times this past year  · They would like her to go home with home hospice and not be readmitted should her condition deteriorate  · See ACP note  · [facility] will be taking over patient's care and evaluate 5/10/2022 in their home  Patient does not wish to turn offer ICD at this time  This can be addressed with [facility]  · Discharged on Roxanol solution and p r n   Ativan     * Acute respiratory failure with hypoxia (HCC)  Assessment & Plan  · Multifactorial secondary to acute on chronic combined congestive heart failure, atrial flutter, COPD, obesity, recent influenza, deconditioning  · Resolved        Discharging Physician / Practitioner: Juan F Mckeon DO  PCP: Prudence Huang MD  Admission Date:       Admission Orders (From admission, onward)              Ordered          05/07/22 1505   Inpatient Admission  Once                          Discharge Date: 05/09/22         Medical Problems                  Resolved Problems  Date Reviewed: 5/8/2022           None                     Consultations During Hospital Stay: Cardiology      Procedures Performed: None     Significant Findings / Test Results:      XR chest 1 view portable     Result Date: 5/8/2022  Impression: Mildly increased right basilar density, nonspecific  Differential includes atelectasis, edema, and pneumonia/aspiration  The study was marked in [facility] for immediate notification  Workstation performed: [id number]         Incidental Findings: None     Test Results Pending at Discharge (will require follow up): None     Outpatient Tests Requested: None     Reason for Admission: Shortness of Breath      Hospital Course:      Abbe Swartz is a 81 y o  female With past medical history of combined systolic/diastolic congestive heart failure, COPD, atrial flutter, CKD, non-insulin-dependent type 2 diabetes presents the hospital with shortness of breath and dyspnea on exertion  Patient was started on IV diuretics with improvement in her volume status  Cardiology follow and titrated cardiac medications  She has had over 10 admissions over the last year for acute heart failure  The admitting physician discussed goals of care with patient and her daughter  They are requesting to return home on hospice  Patient establish with [facility] who will evaluate 5/10/2022       Please see above list of diagnoses and related plan for additional information       Condition at Discharge: stable      Discharge Day Visit / Exam:      Subjective:    Patient seen evaluated at bedside  Denies shortness of breath    She does have a mild frontal headache  Patient wishes to go home with hospice and not come back to the hospital      Vitals: Blood Pressure: 101/56 (05/09/22 1511)  Pulse: 63 (05/09/22 1511)  Temperature: 97 6 °F (36 4 °C) (05/09/22 1511)  Temp Source: Temporal (05/09/22 1511)  Respirations: 18 (05/09/22 1511)  Height: 4' 9" (144 8 cm) (per medical chart) (05/09/22 0903)  Weight - Scale: 58 9 kg (129 lb 12 8 oz) (05/09/22 0600)  SpO2: 97 % (05/09/22 1511)  Exam:   Physical Exam  Vitals reviewed  Constitutional:       General: She is not in acute distress  Appearance: She is well-developed  She is not ill-appearing, toxic-appearing or diaphoretic  HENT:      Head: Normocephalic and atraumatic  Mouth/Throat:      Mouth: Mucous membranes are moist       Pharynx: No oropharyngeal exudate  Eyes:      General: No scleral icterus  Extraocular Movements: Extraocular movements intact  Conjunctiva/sclera: Conjunctivae normal    Cardiovascular:      Rate and Rhythm: Normal rate and regular rhythm  Heart sounds: Normal heart sounds  Pulmonary:      Effort: Pulmonary effort is normal  No respiratory distress  Breath sounds: Normal breath sounds  No wheezing or rales  Abdominal:      General: There is no distension  Palpations: Abdomen is soft  Tenderness: There is no abdominal tenderness  There is no guarding or rebound  Musculoskeletal:         General: No swelling, tenderness or deformity  Skin:     General: Skin is warm and dry  Neurological:      General: No focal deficit present  Mental Status: She is alert  Mental status is at baseline  Psychiatric:         Mood and Affect: Mood normal          Behavior: Behavior normal          Thought Content:  Thought content normal          Judgment: Judgment normal                Discharge instructions/Information to patient and family:   See after visit summary for information provided to patient and family        Provisions for Follow-Up Care:  See after visit summary for information related to follow-up care and any pertinent home health orders        Disposition:      Home with home hospice     Discharge Statement:  I spent 60 minutes discharging the patient  This time was spent on the day of discharge  I had direct contact with the patient on the day of discharge  Greater than 50% of the total time was spent examining patient, answering all patient questions, arranging and discussing plan of care with patient as well as directly providing post-discharge instructions  Additional time then spent on discharge activities      Discharge Medications:  See after visit summary for reconciled discharge medications provided to patient and family        ** Please Note: This note has  Notification of Discharge   This is a Notification of Discharge from our facility [address]  Please be advised that this patient has been discharge from our facility  Below you will find the admission and discharge date and time including the patients disposition  UTILIZATION REVIEW CONTACT:  Deanna Byrd  Utilization   Network Utilization Review Department  Phone: [phone] x carefully listen to the prompts  All voicemails are confidential   Email: [email or web address]     PHYSICIAN ADVISORY SERVICES:  FOR ENCO-FU-VUTD REVIEW - MEDICAL NECESSITY DENIAL  Phone: [phone]  Fax: [phone]  Email: [email or web address]     PRESENTATION DATE: 5/7/2022 10:07 AM    INPATIENT ADMISSION DATE: 5/7/22  3:05 PM   DISCHARGE DATE: 5/9/2022  4:46 PM  DISPOSITION: Home with [address] with Hospice Care      IMPORTANT INFORMATION:  Send all requests for admission clinical reviews, approved or denied determinations and any other requests to dedicated fax number below belonging to the campus where the patient is receiving treatment   List of dedicated fax numbers:  FACILITY NAME UR FAX NUMBER   ADMISSION DENIALS (Administrative/Medical Necessity) [address] [facility] (Maternity/NICU/Pediatrics) [facility] [phone]   [facility] [address] [facility] [phone]   [address] [facility] [address] [place] [address],[address] [phone]   [facility]  [phone]   [address] [facility] [address]  [address] [facility] [address] [facility] [phone]